# Patient Record
Sex: MALE | Race: WHITE | NOT HISPANIC OR LATINO | Employment: FULL TIME | URBAN - METROPOLITAN AREA
[De-identification: names, ages, dates, MRNs, and addresses within clinical notes are randomized per-mention and may not be internally consistent; named-entity substitution may affect disease eponyms.]

---

## 2017-02-02 ENCOUNTER — OFFICE VISIT (OUTPATIENT)
Dept: SLEEP MEDICINE | Facility: CLINIC | Age: 47
End: 2017-02-02
Attending: NURSE PRACTITIONER
Payer: COMMERCIAL

## 2017-02-02 VITALS
HEART RATE: 87 BPM | HEIGHT: 75 IN | SYSTOLIC BLOOD PRESSURE: 124 MMHG | WEIGHT: 315 LBS | DIASTOLIC BLOOD PRESSURE: 81 MMHG | BODY MASS INDEX: 39.17 KG/M2 | OXYGEN SATURATION: 94 % | RESPIRATION RATE: 18 BRPM

## 2017-02-02 DIAGNOSIS — G47.33 OSA (OBSTRUCTIVE SLEEP APNEA): Primary | ICD-10-CM

## 2017-02-02 DIAGNOSIS — E66.9 OBESITY (BMI 30-39.9): ICD-10-CM

## 2017-02-02 DIAGNOSIS — F51.12 INSUFFICIENT SLEEP SYNDROME: ICD-10-CM

## 2017-02-02 PROCEDURE — 40000809 ZZH STATISTIC NO DOCUMENTATION TO SUPPORT CHARGE

## 2017-02-02 PROCEDURE — 99211 OFF/OP EST MAY X REQ PHY/QHP: CPT | Mod: ZF

## 2017-02-02 NOTE — NURSING NOTE
"Chief Complaint   Patient presents with     RECHECK     Follow up cpap.  New rx for supplies needed       Initial There were no vitals taken for this visit. Estimated body mass index is 38.36 kg/(m^2) as calculated from the following:    Height as of 6/30/16: 1.899 m (6' 2.75\").    Weight as of 9/14/16: 138.347 kg (305 lb).  BP completed using cuff size: large Right arm    ISABELLA Russo          "

## 2017-02-02 NOTE — PROGRESS NOTES
HPI: severe moderate ,  New concerns: rainout    Sleep Review of Systems:     Snoring, snort arousals, gasping while on therapy: no     Bedpartner c/o's of cpap: n/a     Heated humidity problems with rainout/excessive dryness, sore throat: rainout     Opening of mouth while on therapy/excessive leak: nasal pillows: no      Swallowing air: no      Skin problems: no     Insufficient sleep, devoting <7 or more hours to sleep: occasional     Problems falling or staying asleep with cpap: falling     RLS: no    SLEEP SCHEDULE:  Bedtime: 24:00                       Range: 23:00                  Sleep Latency: 1/7 >30  Wakeups: rare  Refall  Final wakeup: 6 or 7    Naps: no    Response to therapy: better  Willingness to continue: yes    Equipment issues: old, rain out    Download today:  Full report scanned into medical record  Allergies:    Allergies   Allergen Reactions     Zantac      flushing       Medications:    Current Outpatient Prescriptions   Medication Sig Dispense Refill     Acetaminophen (TYLENOL PO) Take 1,000 mg by mouth 2 times daily       hydrOXYzine (ATARAX) 25 MG tablet Take 25-50 mg up to three times daily as needed and  mg at night for sleep and anxiety. 60 tablet 6     busPIRone (BUSPAR) 15 MG tablet Take 1.5 tablets (22.5 mg) by mouth 2 times daily 90 tablet 1     sertraline (ZOLOFT) 100 MG tablet Take 2 tablets (200 mg) by mouth daily 180 tablet 3     pantoprazole (PROTONIX) 40 MG enteric coated tablet Take 1 tablet (40 mg) by mouth daily 90 tablet 3     rosuvastatin (CRESTOR) 20 MG tablet Take 1 tablet (20 mg) by mouth daily (Patient taking differently: Take 10 mg by mouth daily 20 mg 1/2 tablet daily= total 10 mg daily) 90 tablet 3     losartan (COZAAR) 50 MG tablet Take 1 tablet (50 mg) by mouth daily 90 tablet 3     ORDER FOR DME Auto-CPAP:  Max 15 cm H2O  Min 8 cm H2O    Continuous    Lifetime need and heated humidity.    1 each 0       Problem List:  Patient Active Problem List     Diagnosis Date Noted     Mixed hyperlipidemia 04/07/2016     Priority: Medium     Bone marrow donor 12/09/2013     Priority: Medium     Donor of stem cell 01/03/2014     Obstructive sleep apnea      Problem list name updated by automated process. Provider to review       Obesity (BMI 30-39.9)      Chest pain 02/17/2012     HYPERLIPIDEMIA LDL GOAL <130 10/31/2010     ERIC (generalized anxiety disorder) 11/13/2009     Benign essential hypertension 06/16/2007     fair control  -  continue current meds- trial of 3 months diet/activity changes - then consider change meds.         Gastroesophageal reflux disease without esophagitis 06/16/2007     symptoms stable on protonix - last EGD shows hiatal hernia but no other findings  - continue meds.       Hypersomnia with sleep apnea 01/08/2006     significant moderate  symptoms, and noted apnea/snoring on sleeping - sleep study.  Problem list name updated by automated process. Provider to review          Past Medical/Surgical History:  Past Medical History   Diagnosis Date     Unspecified essential hypertension      Pure hypercholesterolemia      panic attacks, and anxiety  1/5/2006     stable on paxil     Anxiety      Obesity (BMI 30-39.9)      Obstructive sleep apnea (adult) (pediatric)      PONV (postoperative nausea and vomiting)      Gastro-oesophageal reflux disease      Past Surgical History   Procedure Laterality Date     Surgical history of -        excision of bone tumor     Surgical history of -   12/00     vasectomy     Hernia repair       Esophagoscopy, gastroscopy, duodenoscopy (egd), combined  12/17/2012     Procedure: COMBINED ESOPHAGOSCOPY, GASTROSCOPY, DUODENOSCOPY (EGD);  Gastroscopy  ;  Surgeon: Luc Smith MD;  Location: [facility]     Procure bone marrow  1/3/2014     Procedure: PROCURE BONE MARROW;  Bone Marrow [place] [facility];  Surgeon: Chaz Negro MD;  Location:  OR           Physical Examination:  Vitals: /81 mmHg  Pulse 87  Resp  "18  Ht 1.899 m (6' 2.75\")  Wt 144.697 kg (319 lb)  BMI 40.12 kg/m2  SpO2 94%  BMI= Body mass index is 40.12 kg/(m^2).         [place] Total Score 12/10/2013   Total score - [place] 4       GENERAL APPEARANCE: healthy, alert and no distress          Time spent with patient is 40 minutes, of which >50% was spent in counseling, education and coordination of care.    "

## 2017-02-02 NOTE — PATIENT INSTRUCTIONS
Update supplies    Call preferred one: eligible for new cpap? After September    Update me through my chart      Until you are seen again in clinic in the next 9 months, please watch for a return of sleepiness with obstructive sleep apnea is usually due to a change in the followin) a decrease in usage of cpap  2) decrease in amount of time slept  3) a poor seal (often a function of not following cleaning recommendations or  replacement guidelines)  4)  increase in weight or use of sedating medications resulting in higher pressure needs     If you have tried to address these issues but are still sleepy, please call for an earlier appointment.    Please, do not drive if sleepy

## 2017-02-03 NOTE — PROGRESS NOTES
LOCATION:  [facility].      CHIEF COMPLAINT:  Mr. Amarjit Malik returns to clinic today after possibly a 4-year hiatus to evaluate his need for his PAP therapy for his severe obstructive sleep apnea.      HISTORY OF PRESENT ILLNESS:  Mr. Malik is a 46-year-old male comorbid with hypertension, hypercholesterolemia, anxiety and panic attacks.     A PSG at [facility] on 02/11/2007 revealed an AHI of 54 and RDI of 63 with O2 sat corey of 84%.  The patient was titrated to a CPAP pressure of 9 cm of water.  He stopped using CPAP after approximately 1 year of time due to noise.  Dr. Tian did prescribe an auto CPAP with pressures between 7-12 cm of water.  He never got used to CPAP.  At one point was using a full face mask which he could not tolerate.  On 08/08/2012, he did receive a new ResMed APAP.  He also changed over to nasal pillows and appears to be doing quite nicely.  His [place] Sleepiness score today is 4.  He denies restless legs.  His mental health has been a challenge for him, 20/30 days he states mental health is not good.  He has been having more problems with anxiety and has had his medications adjusted and he is noticing some improvement, 15/30 days, problems with tiredness or fatigue.  Sleep environment does show that pets can bother his sleep.  He is currently sleeping without his bed partner since one of their sons did move and there is a bedroom available and both of them get better sleep, see review of systems below.      REVIEW OF SYSTEMS:  A 14-point comprehensive review of systems completed and negative with the exception of the following:  Muscles and bones:  Has been having more problems with back pain, which can be an issue with standing at work.  Mental health:  Anxiety worsening with increase in Zoloft.  He also has been put on a new NSAIDs for his back pain and seems to be getting some relief with that.      [facility] concerns: [facility]    Sleep Review of  Systems:     Snoring, snort arousals, gasping while on therapy: no     Bedpartner c/o's of cpap: n/a     Heated humidity problems with rainout/excessive dryness, sore throat: rainout     Opening of mouth while on therapy/excessive leak: nasal pillows: no      Swallowing air: no      Skin problems: no     Insufficient sleep, devoting <7 or more hours to sleep: occasional     Problems falling or staying asleep with cpap: falling     RLS: no    SLEEP SCHEDULE:  Bedtime: 24:00                       Range: 23:00                  Sleep Latency: 1/7 >30  Wakeups: rare  Refall:n/a  Final wakeup: 6 or 7    Naps: no    Response to therapy: better  Willingness to continue: yes    Equipment issues: old, rain out    Download today:  Full report scanned into medical record  Allergies:    Allergies   Allergen Reactions     Zantac      flushing       Medications:    Current Outpatient Prescriptions   Medication Sig Dispense Refill     Acetaminophen (TYLENOL PO) Take 1,000 mg by mouth 2 times daily       hydrOXYzine (ATARAX) 25 MG tablet Take 25-50 mg up to three times daily as needed and  mg at night for sleep and anxiety. 60 tablet 6     busPIRone (BUSPAR) 15 MG tablet Take 1.5 tablets (22.5 mg) by mouth 2 times daily 90 tablet 1     sertraline (ZOLOFT) 100 MG tablet Take 2 tablets (200 mg) by mouth daily 180 tablet 3     pantoprazole (PROTONIX) 40 MG enteric coated tablet Take 1 tablet (40 mg) by mouth daily 90 tablet 3     rosuvastatin (CRESTOR) 20 MG tablet Take 1 tablet (20 mg) by mouth daily (Patient taking differently: Take 10 mg by mouth daily 20 mg 1/2 tablet daily= total 10 mg daily) 90 tablet 3     losartan (COZAAR) 50 MG tablet Take 1 tablet (50 mg) by mouth daily 90 tablet 3     ORDER FOR DME Auto-CPAP:  Max 15 cm H2O  Min 8 cm H2O    Continuous    Lifetime need and heated humidity.    1 each 0       Problem List:  Patient Active Problem List    Diagnosis Date Noted     Mixed hyperlipidemia 04/07/2016      "Priority: Medium     Bone marrow donor 12/09/2013     Priority: Medium     Donor of stem cell 01/03/2014     Obstructive sleep apnea      Problem list name updated by automated process. Provider to review       Obesity (BMI 30-39.9)      Chest pain 02/17/2012     HYPERLIPIDEMIA LDL GOAL <130 10/31/2010     ERIC (generalized anxiety disorder) 11/13/2009     Benign essential hypertension 06/16/2007     fair control  -  continue current meds- trial of 3 months diet/activity changes - then consider change meds.         Gastroesophageal reflux disease without esophagitis 06/16/2007     symptoms stable on protonix - last EGD shows hiatal hernia but no other findings  - continue meds.       Hypersomnia with sleep apnea 01/08/2006     significant moderate  symptoms, and noted apnea/snoring on sleeping - sleep study.  Problem list name updated by automated process. Provider to review          Past Medical/Surgical History:  Past Medical History   Diagnosis Date     Unspecified essential hypertension      Pure hypercholesterolemia      panic attacks, and anxiety  1/5/2006     stable on paxil     Anxiety      Obesity (BMI 30-39.9)      Obstructive sleep apnea (adult) (pediatric)      PONV (postoperative nausea and vomiting)      Gastro-oesophageal reflux disease      Past Surgical History   Procedure Laterality Date     Surgical history of -        excision of bone tumor     Surgical history of -   12/00     vasectomy     Hernia repair       Esophagoscopy, gastroscopy, duodenoscopy (egd), combined  12/17/2012     Procedure: COMBINED ESOPHAGOSCOPY, GASTROSCOPY, DUODENOSCOPY (EGD);  Gastroscopy  ;  Surgeon: Luc Smith MD;  Location: [facility]     Procure bone marrow  1/3/2014     Procedure: PROCURE BONE MARROW;  Bone Marrow [place] [facility];  Surgeon: Chaz Negro MD;  Location:  OR       Physical Examination:  Vitals: /81 mmHg  Pulse 87  Resp 18  Ht 1.899 m (6' 2.75\")  Wt 144.697 kg (319 lb)  BMI 40.12 " kg/m2  SpO2 94%  BMI= Body mass index is 40.12 kg/(m^2).    [place] Total Score 12/10/2013   Total score - [place] 4       GENERAL APPEARANCE: healthy, alert and no distress    Download today shows average usage days used 6 hours and 49 minutes.  He is on a ResMed min EPAP of 8 cm, max EPAP of 15, 95th percentile pressure, 12.5 cm of water, a leak at 13.3 L/minute.  Usage graph does show some days with barely more than 4 hours and other days up to 9 hours of use.  His residual AHI is 0.7.  One detailed report does show significant run of obstructive apneas, denies alcohol or sedating medications with these events.     ASSESSMENT AND PLAN:  It is my impression that Mr. Malik, has since he has gone to a nasal pillows interface, has had improved compliance and response to CPAP.  He is willing to continue to use his therapy and feels he is getting a good response and the following plan of care has been developed.   1.  Obstructive sleep apnea with excellent response to CPAP PAP therapy, compliance fair.  I have written an order for his CPAP supplies.  He may be due for a new machine after August of this year and I have recommended that he send me a MyChart at that time.  I will then, if approved for his insurance, write a prescription for a new CPAP.  His biggest complaint with his devices is rainout and he is missing that new technology.   2.  Insufficient sleep syndrome.  It does sound that on some nights he is not getting enough time for sleep.  I am uncertain if he has a delay in his circadian phase and with his early rise time with work, needs to pick a regular bedtime and stick with that.  He is encouraged to get 7-9 hours.   3.  Obesity.  He has gone up a little bit in pounds since his sleep study and I have encouraged him to lose weight as he is able.      Thank you for allowing me to participate in this kind man's care.      Time spent with patient was 40 minutes, of which greater than 50% was spent in  counseling, education and coordination of care.         KASEY TAVARES, ELICEO, CNP             D: 2017 17:51   T: 2017 18:23   MT: LOPEZ      Name:     JAYDA KNOX   MRN:      [id number]        Account:      [id number]   :      1970           Visit Date:   2017      Document: [id number]

## 2017-04-11 ENCOUNTER — MYC REFILL (OUTPATIENT)
Dept: FAMILY MEDICINE | Facility: CLINIC | Age: 47
End: 2017-04-11

## 2017-04-11 DIAGNOSIS — K21.9 GASTROESOPHAGEAL REFLUX DISEASE WITHOUT ESOPHAGITIS: ICD-10-CM

## 2017-04-12 RX ORDER — PANTOPRAZOLE SODIUM 40 MG/1
40 TABLET, DELAYED RELEASE ORAL DAILY
Qty: 90 TABLET | Refills: 0 | Status: SHIPPED | OUTPATIENT
Start: 2017-04-12 | End: 2017-07-07

## 2017-04-12 NOTE — TELEPHONE ENCOUNTER
Message from [employer]:  Original authorizing provider: DO Amarjit Ritchie would like a refill of the following medications:  pantoprazole (PROTONIX) 40 MG enteric coated tablet [Ashley Crawley DO]    Preferred pharmacy: [facility] [address]    Comment:  I need this refilled please. can I also have it in 90 day Supply with four or five refills? Thanks Amarjit

## 2017-04-29 ENCOUNTER — MYC REFILL (OUTPATIENT)
Dept: FAMILY MEDICINE | Facility: CLINIC | Age: 47
End: 2017-04-29

## 2017-04-29 DIAGNOSIS — I10 ESSENTIAL HYPERTENSION: ICD-10-CM

## 2017-04-29 DIAGNOSIS — E78.5 HYPERLIPIDEMIA LDL GOAL <130: Primary | ICD-10-CM

## 2017-05-01 RX ORDER — LOSARTAN POTASSIUM 50 MG/1
50 TABLET ORAL DAILY
Qty: 30 TABLET | Refills: 0 | Status: SHIPPED | OUTPATIENT
Start: 2017-05-01 | End: 2017-05-29

## 2017-05-01 NOTE — TELEPHONE ENCOUNTER
Losartan/ cozaar  Last Written Prescription Date: 2/16/16  Last Fill Quantity: 90, # refills: 3  Last Office Visit with [facility] [place], [facility] or [facility] prescribing provider: 6/30/16       Potassium   Date Value Ref Range Status   04/07/2016 3.9 3.4 - 5.3 mmol/L Final     Creatinine   Date Value Ref Range Status   04/07/2016 0.92 0.66 - 1.25 mg/dL Final     BP Readings from Last 3 Encounters:   02/02/17 124/81   09/14/16 126/87   09/14/16 126/87     Medication is being filled for 1 time refill only due to:  Patient needs labs potassium and creat. Kim Mclean RNC  [employer]clean RNC

## 2017-05-01 NOTE — TELEPHONE ENCOUNTER
Message from [employer]:  Original authorizing provider: DO Amarjit Ritchie would like a refill of the following medications:  losartan (COZAAR) 50 MG tablet [Ashley Crawley DO]    Preferred pharmacy: [facility] [address]    Comment:

## 2017-05-29 DIAGNOSIS — I10 ESSENTIAL HYPERTENSION: ICD-10-CM

## 2017-05-29 DIAGNOSIS — E78.5 HYPERLIPIDEMIA LDL GOAL <130: ICD-10-CM

## 2017-05-30 NOTE — TELEPHONE ENCOUNTER
Losartan     Last Written Prescription Date: 05/01/17  Last Fill Quantity: 30, # refills: 0  [place] [facility] or [facility] prescribing provider: 06/30/16       Potassium   Date Value Ref Range Status   04/07/2016 3.9 3.4 - 5.3 mmol/L Final     Creatinine   Date Value Ref Range Status   04/07/2016 0.92 0.66 - 1.25 mg/dL Final     BP Readings from Last 3 Encounters:   02/02/17 124/81   09/14/16 126/87   09/14/16 126/87

## 2017-06-01 RX ORDER — LOSARTAN POTASSIUM 50 MG/1
50 TABLET ORAL DAILY
Qty: 30 TABLET | Refills: 0 | Status: SHIPPED | OUTPATIENT
Start: 2017-06-01 | End: 2017-06-28

## 2017-06-14 DIAGNOSIS — G47.33 OBSTRUCTIVE SLEEP APNEA: Primary | ICD-10-CM

## 2017-06-28 ENCOUNTER — MYC REFILL (OUTPATIENT)
Dept: PSYCHIATRY | Facility: CLINIC | Age: 47
End: 2017-06-28

## 2017-06-28 DIAGNOSIS — I10 ESSENTIAL HYPERTENSION: ICD-10-CM

## 2017-06-28 DIAGNOSIS — E78.5 HYPERLIPIDEMIA LDL GOAL <130: ICD-10-CM

## 2017-06-28 DIAGNOSIS — F41.1 GAD (GENERALIZED ANXIETY DISORDER): ICD-10-CM

## 2017-06-29 RX ORDER — BUSPIRONE HYDROCHLORIDE 15 MG/1
22.5 TABLET ORAL 2 TIMES DAILY
Qty: 90 TABLET | Refills: 1 | OUTPATIENT
Start: 2017-06-29

## 2017-06-29 NOTE — TELEPHONE ENCOUNTER
Message from [employer]:  Original authorizing provider: ELICEO Chatterjee would like a refill of the following medications:  busPIRone (BUSPAR) 15 MG tablet [ELICEO Chatterjee]    Preferred pharmacy: [place], [place] - [address]    Comment:  I have been out of town I will set up an appointment in the next two to three weeks    Medication renewals requested in this message routed to other providers:  losartan (COZAAR) 50 MG tablet [Ashley Crawley, ]

## 2017-06-30 ENCOUNTER — OFFICE VISIT (OUTPATIENT)
Dept: PSYCHIATRY | Facility: CLINIC | Age: 47
End: 2017-06-30
Payer: COMMERCIAL

## 2017-06-30 VITALS
BODY MASS INDEX: 38.91 KG/M2 | DIASTOLIC BLOOD PRESSURE: 96 MMHG | RESPIRATION RATE: 18 BRPM | SYSTOLIC BLOOD PRESSURE: 148 MMHG | WEIGHT: 309.2 LBS | HEART RATE: 83 BPM

## 2017-06-30 DIAGNOSIS — F41.1 GAD (GENERALIZED ANXIETY DISORDER): Chronic | ICD-10-CM

## 2017-06-30 PROCEDURE — 99213 OFFICE O/P EST LOW 20 MIN: CPT | Performed by: CLINICAL NURSE SPECIALIST

## 2017-06-30 RX ORDER — BUSPIRONE HYDROCHLORIDE 15 MG/1
22.5 TABLET ORAL 2 TIMES DAILY
Qty: 90 TABLET | Refills: 3 | Status: SHIPPED | OUTPATIENT
Start: 2017-06-30 | End: 2018-06-07

## 2017-06-30 RX ORDER — SERTRALINE HYDROCHLORIDE 100 MG/1
200 TABLET, FILM COATED ORAL DAILY
Qty: 180 TABLET | Refills: 3 | Status: SHIPPED | OUTPATIENT
Start: 2017-06-30 | End: 2018-02-27 | Stop reason: ALTCHOICE

## 2017-06-30 ASSESSMENT — ANXIETY QUESTIONNAIRES
7. FEELING AFRAID AS IF SOMETHING AWFUL MIGHT HAPPEN: NOT AT ALL
2. NOT BEING ABLE TO STOP OR CONTROL WORRYING: NOT AT ALL
GAD7 TOTAL SCORE: 2
1. FEELING NERVOUS, ANXIOUS, OR ON EDGE: SEVERAL DAYS
6. BECOMING EASILY ANNOYED OR IRRITABLE: NOT AT ALL
3. WORRYING TOO MUCH ABOUT DIFFERENT THINGS: NOT AT ALL
5. BEING SO RESTLESS THAT IT IS HARD TO SIT STILL: NOT AT ALL

## 2017-06-30 ASSESSMENT — PATIENT HEALTH QUESTIONNAIRE - PHQ9: 5. POOR APPETITE OR OVEREATING: SEVERAL DAYS

## 2017-06-30 NOTE — MR AVS SNAPSHOT
After Visit Summary   6/30/2017    Amarjit Malik    MRN: [id number]           Patient Information     Date Of Birth          1970        Visit Information        Provider Department      6/30/2017 10:45 AM Nolvia Katz APRN [facility]        Today's Diagnoses     ERIC (generalized anxiety disorder)          Care Instructions    Treatment Plan:  Continue sertraline (Zoloft) 200 mg daily, hydroxyzine 25-50 mg three times daily as needed for anxiety.     Restart buspirone (Buspar) 7.5 mg twice daily for 3 days, then 15 mg twice daily for 3 days, then 22.5 mg twice daily.     Follow up with primary care provider.     - Recommend patient discuss medications with their pharmacist. Risks and benefits for medications were discussed including, but not limited to, side effects.   - Safety plan was reviewed; to the ER as needed or call after hours crisis line; [phone]  - Education and counseling was done regarding use of medications, psychotherapy options  - Call [phone] for appointment or to speak to a nurse.    -Office hours: Monday through Thursday 8:00 am to 4:30 pm; Friday 8:00 am to Noon.             Follow-ups after your visit        Your next 10 appointments already scheduled     Jul 07, 2017  6:40 AM CDT   SHORT with Ashley Crawley,    [facility]    [address] [address] [phone]              Who to contact     If you have questions or need follow up information about today's clinic visit or your schedule please contact [facility] directly at [phone].  Normal or non-critical lab and imaging results will be communicated to you by MyChart, letter or phone within 4 business days after the clinic has received the results. If you do not hear from us within 7 days, please contact the clinic through MyChart or phone. If you have a critical or abnormal lab result, we  will notify you by phone as soon as possible.  Submit refill requests through [employer] or call your pharmacy and they will forward the refill request to us. Please allow 3 business days for your refill to be completed.          Additional Information About Your Visit        [employer] Information     [employer] gives you secure access to your electronic health record. If you see a primary care provider, you can also send messages to your care team and make appointments. If you have questions, please call your primary care clinic.  If you do not have a primary care provider, please call [phone] and they will assist you.        Care EveryWhere ID     This is your Care EveryWhere ID. This could be used by other organizations to access your [facility] medical records  [id number]        Your Vitals Were     Pulse Respirations BMI (Body Mass Index)             83 18 38.91 kg/m2          Blood Pressure from Last 3 Encounters:   06/30/17 (!) 148/96   02/02/17 124/81   09/14/16 126/87    Weight from Last 3 Encounters:   06/30/17 (!) 309 lb 3.2 oz (140.3 kg)   02/02/17 (!) 319 lb (144.7 kg)   09/14/16 (!) 305 lb (138.3 kg)              Today, you had the following     No orders found for display         Today's Medication Changes          These changes are accurate as of: 6/30/17 11:05 AM.  If you have any questions, ask your nurse or doctor.               These medicines have changed or have updated prescriptions.        Dose/Directions    rosuvastatin 20 MG tablet   Commonly known as:  CRESTOR   This may have changed:    - how much to take  - additional instructions   Used for:  Hyperlipidemia LDL goal <130        Dose:  20 mg   Take 1 tablet (20 mg) by mouth daily   Quantity:  90 tablet   Refills:  3            Where to get your medicines      These medications were sent to [facility] - [address]  [address] [address]     Phone:  [phone]     busPIRone 15 MG tablet     sertraline 100 MG tablet                Primary Care Provider Office Phone # Fax #    Ashley Crawley [phone]       [facility] [address] [address]        Equal Access to Services     ASTER BARNEY : Hadshyanne palacio haddarinelo Soomaali, waaxda luqadaha, qaybta kaalmada adeegyada, waxalejo carey haygelyn jeri patriciasami estevez. So [facility] [phone].    ATENCIÓN: Si habla español, tiene a fontanez disposición servicios gratuitos de asistencia lingüística. Llame al [phone].    We comply with applicable federal civil rights laws and [place] laws. We do not discriminate on the basis of race, color, national origin, age, disability sex, sexual orientation or gender identity.            Thank you!     Thank you for choosing [facility]  for your care. Our goal is always to provide you with excellent care. Hearing back from our patients is one way we can continue to improve our services. Please take a few minutes to complete the written survey that you may receive in the mail after your visit with us. Thank you!             Your Updated Medication List - Protect others around you: Learn how to safely use, store and throw away your medicines at www.disposemymeds.org.          This list is accurate as of: 6/30/17 11:05 AM.  Always use your most recent med list.                   Brand Name Dispense Instructions for use Diagnosis    busPIRone 15 MG tablet    BUSPAR    90 tablet    Take 1.5 tablets (22.5 mg) by mouth 2 times daily    ERIC (generalized anxiety disorder)       hydrOXYzine 25 MG tablet    ATARAX    60 tablet    Take 25-50 mg up to three times daily as needed and  mg at night for sleep and anxiety.    ERIC (generalized anxiety disorder)       losartan 50 MG tablet    COZAAR    30 tablet    Take 1 tablet (50 mg) by mouth daily (Needs follow-up appointment for this medication)    Essential hypertension, Hyperlipidemia LDL goal <130       order for DME      1 each    Auto-CPAP: Max 15 cm H2O Min 8 cm H2O  Continuous  Lifetime need and heated humidity.    Hypersomnia with sleep apnea, unspecified       pantoprazole 40 MG EC tablet    PROTONIX    90 tablet    Take 1 tablet (40 mg) by mouth daily    Gastroesophageal reflux disease without esophagitis       rosuvastatin 20 MG tablet    CRESTOR    90 tablet    Take 1 tablet (20 mg) by mouth daily    Hyperlipidemia LDL goal <130       sertraline 100 MG tablet    ZOLOFT    180 tablet    Take 2 tablets (200 mg) by mouth daily    ERIC (generalized anxiety disorder)       TYLENOL PO      Take 1,000 mg by mouth 2 times daily

## 2017-06-30 NOTE — PATIENT INSTRUCTIONS
Treatment Plan:  Continue sertraline (Zoloft) 200 mg daily, hydroxyzine 25-50 mg three times daily as needed for anxiety.     Restart buspirone (Buspar) 7.5 mg twice daily for 3 days, then 15 mg twice daily for 3 days, then 22.5 mg twice daily.     Follow up with primary care provider.     - Recommend patient discuss medications with their pharmacist. Risks and benefits for medications were discussed including, but not limited to, side effects.   - Safety plan was reviewed; to the ER as needed or call after hours crisis line; [phone]  - Education and counseling was done regarding use of medications, psychotherapy options  - Call [phone] for appointment or to speak to a nurse.    -Office hours: Monday through Thursday 8:00 am to 4:30 pm; Friday 8:00 am to Noon.

## 2017-06-30 NOTE — PROGRESS NOTES
"      Psychiatric Progress Note    Name: Amarjti Malik  Date: 6/30/2017  Length of Visit: 30 minutes  MRN: [id number]      Current Outpatient Prescriptions   Medication Sig     losartan (COZAAR) 50 MG tablet Take 1 tablet (50 mg) by mouth daily (Needs follow-up appointment for this medication)     pantoprazole (PROTONIX) 40 MG EC tablet Take 1 tablet (40 mg) by mouth daily     hydrOXYzine (ATARAX) 25 MG tablet Take 25-50 mg up to three times daily as needed and  mg at night for sleep and anxiety.     busPIRone (BUSPAR) 15 MG tablet Take 1.5 tablets (22.5 mg) by mouth 2 times daily     sertraline (ZOLOFT) 100 MG tablet Take 2 tablets (200 mg) by mouth daily     rosuvastatin (CRESTOR) 20 MG tablet Take 1 tablet (20 mg) by mouth daily (Patient taking differently: Take 10 mg by mouth daily 20 mg 1/2 tablet daily= total 10 mg daily)     Acetaminophen (TYLENOL PO) Take 1,000 mg by mouth 2 times daily     ORDER FOR DME Auto-CPAP:  Max 15 cm H2O  Min 8 cm H2O    Continuous    Lifetime need and heated humidity.        No current facility-administered medications for this visit.        Therapist:  None    PHQ-9:  PHQ-9 score:    PHQ-9 SCORE 9/14/2016   Total Score -   Total Score 2       ERIC-7:  ERIC-7 SCORE 1/21/2015 6/30/2016 9/14/2016   Total Score 2 - -   Total Score - 11 7           Interim History:  Patient returns for follow up from initial appointment 9-. Sertraline (Zoloft) 200 mg daily and hydroxyzine 25-50 mg twice daily was continued. Buspirone (Buspar) was increased to 22.5 mg twice daily. Patient was to follow up in 3 weeks.     Patient reports he ran out of buspirone (Buspar) approximately 5 days ago and \"doesn't feel any different\". Patient reports has not needed to use the hydroxyzine.  Patient reports the sertraline (Zoloft) 200 mg has been working. Patient states he is here for refills. Patient denies depression and anxiety symptoms.       Past Medical History:   Diagnosis Date     Anxiety      " "Gastro-oesophageal reflux disease      Obesity (BMI 30-39.9)      Obstructive sleep apnea (adult) (pediatric)      panic attacks, and anxiety  1/5/2006    stable on paxil     PONV (postoperative nausea and vomiting)      Pure hypercholesterolemia      Unspecified essential hypertension        10 point ROS is negative except for those listed above.     Vital Signs:   BP (!) 148/96  Pulse 83  Resp 18  Wt (!) 309 lb 3.2 oz (140.3 kg)  BMI 38.91 kg/m2      Mental Status Assessment:  Appearance:  Well groomed     Behavior/relationship to examiner/demeanor:  Cooperative, engaged and pleasant  Motor activity:  Normal  Gait:  Normal   Speech:  Normal in volume, articulation, coherence   Mood (subjective report):  \"Good\"  Affect (objective appearance):  Mood congruent  Thought Process (Associations):  Logical, linear and goal directed  Thought content:  No evidence of suicidal or homicidal ideation,          no overt psychosis and                    patient does not appear to be responding to internal stimuli  Oriented to person, place, date/time   Attention Span and concentration: Intact   Memory:  Short-term memory intact and Long-term memory; Intact  Language:  Fluent   Fund of Knowledge/Intelligence:  Average  Use of language: Intact   Abstraction:  Normal  Insight:  Adequate  Judgment:  Adequate for safety    DSM DIAGNOSIS:  300.01 (F41.0) Panic Disorder  300.02 (F41.1) Generalized Anxiety Disorder    Assessment:  Patient's mood is stable. Refills provided. Follow up with PCP.     Medication side effects and alternatives were reviewed.     Treatment Plan:  Continue sertraline (Zoloft) 200 mg daily, hydroxyzine 25-50 mg three times daily as needed for anxiety.     If needed, restart buspirone (Buspar) 7.5 mg twice daily for 3 days, then 15 mg twice daily for 3 days, then 22.5 mg twice daily.     Follow up with primary care provider.     - Recommend patient discuss medications with their pharmacist. Risks and benefits " for medications were discussed including, but not limited to, side effects.   - Safety plan was reviewed; to the ER as needed or call after hours crisis line; [phone]  - Education and counseling was done regarding use of medications, psychotherapy options  - Call [phone] for appointment or to speak to a nurse.    -Office hours: Monday through Thursday 8:00 am to 4:30 pm; Friday 8:00 am to Noon.   - Patient received a copy of this Treatment Plan today.    The patient is being returned to the referring provider for ongoing care and medication prescribing.  The patient can be referred back to this service for further consultation as needed.      Signed:  Nolvia Katz RN, MS, CNS-BC

## 2017-06-30 NOTE — NURSING NOTE
"Chief Complaint   Patient presents with     Recheck Medication       Initial BP (!) 148/96  Pulse 83  Resp 18  Wt (!) 309 lb 3.2 oz (140.3 kg)  BMI 38.91 kg/m2 Estimated body mass index is 38.91 kg/(m^2) as calculated from the following:    Height as of 2/2/17: 6' 2.75\" (1.899 m).    Weight as of this encounter: 309 lb 3.2 oz (140.3 kg).  Medication Reconciliation: complete    "

## 2017-07-01 ASSESSMENT — ANXIETY QUESTIONNAIRES: GAD7 TOTAL SCORE: 2

## 2017-07-01 ASSESSMENT — PATIENT HEALTH QUESTIONNAIRE - PHQ9: SUM OF ALL RESPONSES TO PHQ QUESTIONS 1-9: 3

## 2017-07-06 RX ORDER — LOSARTAN POTASSIUM 50 MG/1
TABLET ORAL
Qty: 30 TABLET | Refills: 0 | OUTPATIENT
Start: 2017-07-06

## 2017-07-07 ENCOUNTER — OFFICE VISIT (OUTPATIENT)
Dept: FAMILY MEDICINE | Facility: CLINIC | Age: 47
End: 2017-07-07
Payer: COMMERCIAL

## 2017-07-07 VITALS
WEIGHT: 312 LBS | DIASTOLIC BLOOD PRESSURE: 84 MMHG | HEIGHT: 75 IN | TEMPERATURE: 96.4 F | HEART RATE: 74 BPM | BODY MASS INDEX: 38.79 KG/M2 | SYSTOLIC BLOOD PRESSURE: 123 MMHG

## 2017-07-07 DIAGNOSIS — D23.5 BENIGN NEOPLASM OF SKIN OF TRUNK, EXCEPT SCROTUM: ICD-10-CM

## 2017-07-07 DIAGNOSIS — I10 ESSENTIAL HYPERTENSION: ICD-10-CM

## 2017-07-07 DIAGNOSIS — R22.2 CHEST WALL MASS: ICD-10-CM

## 2017-07-07 DIAGNOSIS — D17.30 LIPOMA OF SKIN AND SUBCUTANEOUS TISSUE: ICD-10-CM

## 2017-07-07 DIAGNOSIS — E78.5 HYPERLIPIDEMIA LDL GOAL <130: ICD-10-CM

## 2017-07-07 DIAGNOSIS — K21.9 GASTROESOPHAGEAL REFLUX DISEASE WITHOUT ESOPHAGITIS: ICD-10-CM

## 2017-07-07 DIAGNOSIS — Z12.5 SCREENING FOR PROSTATE CANCER: Primary | ICD-10-CM

## 2017-07-07 LAB
ANION GAP SERPL CALCULATED.3IONS-SCNC: 6 MMOL/L (ref 3–14)
BUN SERPL-MCNC: 20 MG/DL (ref 7–30)
CALCIUM SERPL-MCNC: 8.8 MG/DL (ref 8.5–10.1)
CHLORIDE SERPL-SCNC: 106 MMOL/L (ref 94–109)
CHOLEST SERPL-MCNC: 224 MG/DL
CO2 SERPL-SCNC: 28 MMOL/L (ref 20–32)
CREAT SERPL-MCNC: 0.96 MG/DL (ref 0.66–1.25)
GFR SERPL CREATININE-BSD FRML MDRD: 84 ML/MIN/1.7M2
GLUCOSE SERPL-MCNC: 95 MG/DL (ref 70–99)
HDLC SERPL-MCNC: 31 MG/DL
LDLC SERPL CALC-MCNC: ABNORMAL MG/DL
LDLC SERPL DIRECT ASSAY-MCNC: 133 MG/DL
NONHDLC SERPL-MCNC: 193 MG/DL
POTASSIUM SERPL-SCNC: 3.9 MMOL/L (ref 3.4–5.3)
PSA SERPL-ACNC: 0.58 UG/L (ref 0–4)
SODIUM SERPL-SCNC: 140 MMOL/L (ref 133–144)
TRIGL SERPL-MCNC: 431 MG/DL

## 2017-07-07 PROCEDURE — 80061 LIPID PANEL: CPT | Performed by: INTERNAL MEDICINE

## 2017-07-07 PROCEDURE — 80048 BASIC METABOLIC PNL TOTAL CA: CPT | Performed by: INTERNAL MEDICINE

## 2017-07-07 PROCEDURE — 83721 ASSAY OF BLOOD LIPOPROTEIN: CPT | Mod: 59 | Performed by: INTERNAL MEDICINE

## 2017-07-07 PROCEDURE — G0103 PSA SCREENING: HCPCS | Performed by: INTERNAL MEDICINE

## 2017-07-07 PROCEDURE — 36415 COLL VENOUS BLD VENIPUNCTURE: CPT | Performed by: INTERNAL MEDICINE

## 2017-07-07 PROCEDURE — 99214 OFFICE O/P EST MOD 30 MIN: CPT | Performed by: INTERNAL MEDICINE

## 2017-07-07 RX ORDER — LOSARTAN POTASSIUM 50 MG/1
50 TABLET ORAL DAILY
Qty: 90 TABLET | Refills: 3 | Status: SHIPPED | OUTPATIENT
Start: 2017-07-07 | End: 2018-07-31

## 2017-07-07 RX ORDER — PANTOPRAZOLE SODIUM 40 MG/1
40 TABLET, DELAYED RELEASE ORAL DAILY
Qty: 90 TABLET | Refills: 3 | Status: SHIPPED | OUTPATIENT
Start: 2017-07-07 | End: 2018-07-06

## 2017-07-07 RX ORDER — ROSUVASTATIN CALCIUM 10 MG/1
10 TABLET, COATED ORAL DAILY
Qty: 90 TABLET | Refills: 3 | Status: SHIPPED | OUTPATIENT
Start: 2017-07-07 | End: 2018-07-31

## 2017-07-07 NOTE — NURSING NOTE
"Chief Complaint   Patient presents with     Results     go over done today     Hypertension     recheck     Refill Request     pending       Initial /84  Pulse 74  Temp 96.4  F (35.8  C) (Tympanic)  Ht 6' 2.75\" (1.899 m)  Wt (!) 312 lb (141.5 kg)  BMI 39.26 kg/m2 Estimated body mass index is 39.26 kg/(m^2) as calculated from the following:    Height as of this encounter: 6' 2.75\" (1.899 m).    Weight as of this encounter: 312 lb (141.5 kg).  Medication Reconciliation: complete  "

## 2017-07-07 NOTE — PATIENT INSTRUCTIONS
Thank you for choosing [facility].  You may be receiving a survey in the mail from Suleman Perez regarding your visit today.  Please take a few minutes to complete and return the survey to let us know how we are doing.      If you have questions or concerns, please contact us via [employer] or you can contact your care team at [phone].    Our Clinic hours are:  Monday 6:40 am  to 7:00 pm  Tuesday -Friday 6:40 am to 5:00 pm    The [place] outpatient lab hours are:  Monday - Friday 6:10 am to 4:45 pm  Saturdays 7:00 am to 11:00 am  Appointments are required, call [phone]    If you have clinical questions after hours or would like to schedule an appointment,  call the clinic at [phone].           [facility] - [id number]      1. Refills given  2. Labs to return later today  3. Referral to general surgery  4. Referral for ultrasound of chest wall mass

## 2017-07-07 NOTE — MR AVS SNAPSHOT
After Visit Summary   7/7/2017    Amarjit Malik    MRN: [id number]           Patient Information     Date Of Birth          1970        Visit Information        Provider Department      7/7/2017 6:40 AM Ashley Crawley, DO [facility]        Today's Diagnoses     Screening for prostate cancer    -  1    Essential hypertension        Hyperlipidemia LDL goal <130        Gastroesophageal reflux disease without esophagitis        Chest wall mass        Benign neoplasm of skin of trunk, except scrotum        Lipoma of skin and subcutaneous tissue          Care Instructions          Thank you for choosing [facility] (formerly [facility]  You may be receiving a survey in the mail from Suleman Perez regarding your visit today.  Please take a few minutes to complete and return the survey to let us know how we are doing.      If you have questions or concerns, please contact us via [employer] or you can contact your care team at [phone].    Our Clinic hours are:  Monday 6:40 am  to 7:00 pm  Tuesday -Friday 6:40 am to 5:00 pm    The [place] outpatient lab hours are:  Monday - Friday 6:10 am to 4:45 pm  Saturdays 7:00 am to 11:00 am  Appointments are required, call [phone]    If you have clinical questions after hours or would like to schedule an appointment,  call the clinic at [phone].           [facility] - [id number]      1. Refills given  2. Labs to return later today  3. Referral to general surgery  4. Referral for ultrasound of chest wall mass          Follow-ups after your visit        Additional Services     GENERAL SURG ADULT REFERRAL       Your provider has referred you to: [facility]: [facility] [phone]   [email or web address]    Please be aware that coverage of these services is subject to the terms and limitations of your health insurance plan.  Call member services at your health plan with any  benefit or coverage questions.      Please bring the following with you to your appointment:    (1) Any X-Rays, CTs or MRIs which have been performed.  Contact the facility where they were done to arrange for  prior to your scheduled appointment.   (2) List of current medications   (3) This referral request   (4) Any documents/labs given to you for this referral                  Future tests that were ordered for you today     Open Future Orders        Priority Expected Expires Ordered    US Breast Left Complete 4 Quadrants Routine  7/7/2018 7/7/2017            Who to contact     If you have questions or need follow up information about today's clinic visit or your schedule please contact [facility] directly at [phone].  Normal or non-critical lab and imaging results will be communicated to you by MyChart, letter or phone within 4 business days after the clinic has received the results. If you do not hear from us within 7 days, please contact the clinic through [employer] or phone. If you have a critical or abnormal lab result, we will notify you by phone as soon as possible.  Submit refill requests through [employer] or call your pharmacy and they will forward the refill request to us. Please allow 3 business days for your refill to be completed.          Additional Information About Your Visit        [employer] Expert Information     [employer] gives you secure access to your electronic health record. If you see a primary care provider, you can also send messages to your care team and make appointments. If you have questions, please call your primary care clinic.  If you do not have a primary care provider, please call [phone] and they will assist you.        Care EveryWhere ID     This is your Care EveryWhere ID. This could be used by other organizations to access your [place] medical records  [id number]        Your Vitals Were     Pulse Temperature Height BMI (Body Mass Index)          74 96.4  F  "(35.8  C) (Tympanic) 6' 2.75\" (1.899 m) 39.26 kg/m2         Blood Pressure from Last 3 Encounters:   07/07/17 123/84   06/30/17 (!) 148/96   02/02/17 124/81    Weight from Last 3 Encounters:   07/07/17 (!) 312 lb (141.5 kg)   06/30/17 (!) 309 lb 3.2 oz (140.3 kg)   02/02/17 (!) 319 lb (144.7 kg)              We Performed the Following     **Basic metabolic panel FUTURE anytime     **Lipid panel reflex to direct LDL FUTURE anytime     GENERAL SURG ADULT REFERRAL     PSA, screen          Today's Medication Changes          These changes are accurate as of: 7/7/17  7:03 AM.  If you have any questions, ask your nurse or doctor.               These medicines have changed or have updated prescriptions.        Dose/Directions    losartan 50 MG tablet   Commonly known as:  COZAAR   This may have changed:  additional instructions   Used for:  Essential hypertension, Hyperlipidemia LDL goal <130   Changed by:  Ashley Crawley DO        Dose:  50 mg   Take 1 tablet (50 mg) by mouth daily   Quantity:  90 tablet   Refills:  3       rosuvastatin 10 MG tablet   Commonly known as:  CRESTOR   This may have changed:    - medication strength  - how much to take   Used for:  Hyperlipidemia LDL goal <130   Changed by:  Ashley Crawley DO        Dose:  10 mg   Take 1 tablet (10 mg) by mouth daily   Quantity:  90 tablet   Refills:  3            Where to get your medicines      These medications were sent to [facility] [id number] [facility] [address]     Phone:  [phone]     losartan 50 MG tablet    pantoprazole 40 MG EC tablet    rosuvastatin 10 MG tablet                Primary Care Provider Office Phone # Fax #    Ashley Cralwey [phone]       [facility] [address] [facility] [address]        Equal Access to Services     ASTER BARNEY AH: Gloria chávezo Soomaali, waaxda luqadaha, qaybta kaalmada adejarrod, he " carey taylorerum byrd'aan ah. So [facility] [phone].    ATENCIÓN: Si premala celi, tiene a fontanez disposición servicios gratuitos de asistencia lingüística. Kristyn yanes [phone].    We comply with applicable federal civil rights laws and [place] laws. We do not discriminate on the basis of race, color, national origin, age, disability sex, sexual orientation or gender identity.            Thank you!     Thank you for choosing [facility]  for your care. Our goal is always to provide you with excellent care. Hearing back from our patients is one way we can continue to improve our services. Please take a few minutes to complete the written survey that you may receive in the mail after your visit with us. Thank you!             Your Updated Medication List - Protect others around you: Learn how to safely use, store and throw away your medicines at www.disposemymeds.org.          This list is accurate as of: 7/7/17  7:03 AM.  Always use your most recent med list.                   Brand Name Dispense Instructions for use Diagnosis    busPIRone 15 MG tablet    BUSPAR    90 tablet    Take 1.5 tablets (22.5 mg) by mouth 2 times daily    ERIC (generalized anxiety disorder)       hydrOXYzine 25 MG tablet    ATARAX    60 tablet    Take 25-50 mg up to three times daily as needed and  mg at night for sleep and anxiety.    ERIC (generalized anxiety disorder)       losartan 50 MG tablet    COZAAR    90 tablet    Take 1 tablet (50 mg) by mouth daily    Essential hypertension, Hyperlipidemia LDL goal <130       order for DME     1 each    Auto-CPAP: Max 15 cm H2O Min 8 cm H2O  Continuous  Lifetime need and heated humidity.    Hypersomnia with sleep apnea, unspecified       pantoprazole 40 MG EC tablet    PROTONIX    90 tablet    Take 1 tablet (40 mg) by mouth daily    Gastroesophageal reflux disease without esophagitis       rosuvastatin 10 MG tablet    CRESTOR    90 tablet    Take 1 tablet (10 mg) by mouth  daily    Hyperlipidemia LDL goal <130       sertraline 100 MG tablet    ZOLOFT    180 tablet    Take 2 tablets (200 mg) by mouth daily    ERIC (generalized anxiety disorder)       TYLENOL PO      Take 1,000 mg by mouth 2 times daily

## 2017-07-07 NOTE — PROGRESS NOTES
SUBJECTIVE:                                                    Amarjit Malik is a 46 year old male who presents to clinic today for the following health issues:      Hypertension Follow-up      Outpatient blood pressures are being checked at home.  Results are 130/92.    Low Salt Diet: not monitoring salt    Anxiety Follow-Up    Status since last visit: No change    Other associated symptoms:None    Complicating factors:   Significant life event: No   Current substance abuse: None  Depression symptoms: No  ERIC-7 SCORE 6/30/2016 9/14/2016 6/30/2017   Total Score - - -   Total Score 11 7 2       GAD7        Amount of exercise or physical activity: None    Problems taking medications regularly: No    Medication side effects: none    Diet: regular (no restrictions)    Chief Complaint   Patient presents with     Results     go over done today     Hypertension     recheck     Refill Request     pending   Derm: few spots/cysts checked out.  Posterior neck - tender frequently.  Present for a while   left arm near antecubital space - painful when bumped, present for while   left lower breast area - noted 6-7 months, not getting bigger, non-painful      Current Outpatient Prescriptions   Medication Sig Dispense Refill     sertraline (ZOLOFT) 100 MG tablet Take 2 tablets (200 mg) by mouth daily 180 tablet 3     losartan (COZAAR) 50 MG tablet Take 1 tablet (50 mg) by mouth daily (Needs follow-up appointment for this medication) 30 tablet 0     pantoprazole (PROTONIX) 40 MG EC tablet Take 1 tablet (40 mg) by mouth daily 90 tablet 0     rosuvastatin (CRESTOR) 20 MG tablet Take 1 tablet (20 mg) by mouth daily (Patient taking differently: Take 10 mg by mouth daily 20 mg 1/2 tablet daily= total 10 mg daily) 90 tablet 3     busPIRone (BUSPAR) 15 MG tablet Take 1.5 tablets (22.5 mg) by mouth 2 times daily (Patient not taking: Reported on 7/7/2017) 90 tablet 3     hydrOXYzine (ATARAX) 25 MG tablet Take 25-50 mg up to three times  "daily as needed and  mg at night for sleep and anxiety. (Patient not taking: Reported on 7/7/2017) 60 tablet 6     Acetaminophen (TYLENOL PO) Take 1,000 mg by mouth 2 times daily       ORDER FOR DME Auto-CPAP:  Max 15 cm H2O  Min 8 cm H2O    Continuous    Lifetime need and heated humidity.    1 each 0       Reviewed and updated as needed this visit by clinical staff  Tobacco  Allergies  Meds  Problems       Reviewed and updated as needed this visit by Provider  Allergies  Meds  Problems         ROS:  Constitutional, HEENT, cardiovascular, pulmonary, gi and gu systems are negative, except as otherwise noted.    OBJECTIVE:     /84  Pulse 74  Temp 96.4  F (35.8  C) (Tympanic)  Ht 6' 2.75\" (1.899 m)  Wt (!) 312 lb (141.5 kg)  BMI 39.26 kg/m2  Body mass index is 39.26 kg/(m^2).  GENERAL APPEARANCE: healthy, alert and no distress  BREAST: right breast normal without masses, tenderness or nipple discharge and no palpable axillary masses or adenopathy.  Left breast with ~1x1.5 cm firm non-mobile mass at 6 o'clock position of left breast  SKIN: deep ~1x2 cm non-tender irregular mass in left arm near antecubital area.  In left posterior neck there is well circumscribed tender superficial round mobile cyst.      ASSESSMENT/PLAN:         ICD-10-CM    1. Screening for prostate cancer Z12.5 PSA, screen   2. Essential hypertension I10 **Basic metabolic panel FUTURE anytime     **Lipid panel reflex to direct LDL FUTURE anytime     losartan (COZAAR) 50 MG tablet   3. Hyperlipidemia LDL goal <130 E78.5 **Basic metabolic panel FUTURE anytime     **Lipid panel reflex to direct LDL FUTURE anytime     losartan (COZAAR) 50 MG tablet     rosuvastatin (CRESTOR) 10 MG tablet   4. Gastroesophageal reflux disease without esophagitis K21.9 pantoprazole (PROTONIX) 40 MG EC tablet   5. Chest wall mass R22.2 US Breast Left Complete 4 Quadrants   6. Benign neoplasm of skin of trunk, except scrotum D23.5 GENERAL SURG ADULT " REFERRAL   7. Lipoma of skin and subcutaneous tissue D17.30      Mass on chest wall/breast area is likely benign, but needs to be r/o for breast cancer.  Mass in neck is likely sebaceous cyst.  Mass in arm is likely lipoma, observe.    Ashley Crawley,   [facility]

## 2017-08-03 ENCOUNTER — HOSPITAL ENCOUNTER (OUTPATIENT)
Dept: MAMMOGRAPHY | Facility: CLINIC | Age: 47
Discharge: HOME | End: 2017-08-03
Attending: INTERNAL MEDICINE | Admitting: INTERNAL MEDICINE
Payer: COMMERCIAL

## 2017-08-03 ENCOUNTER — HOSPITAL ENCOUNTER (OUTPATIENT)
Dept: ULTRASOUND IMAGING | Facility: CLINIC | Age: 47
End: 2017-08-03
Attending: INTERNAL MEDICINE
Payer: COMMERCIAL

## 2017-08-03 DIAGNOSIS — R22.2 CHEST WALL MASS: ICD-10-CM

## 2017-08-03 PROCEDURE — G0204 DX MAMMO INCL CAD BI: HCPCS

## 2017-08-03 PROCEDURE — 76642 ULTRASOUND BREAST LIMITED: CPT | Mod: LT

## 2017-08-30 ENCOUNTER — OFFICE VISIT (OUTPATIENT)
Dept: SURGERY | Facility: CLINIC | Age: 47
End: 2017-08-30
Payer: COMMERCIAL

## 2017-08-30 VITALS
HEART RATE: 75 BPM | SYSTOLIC BLOOD PRESSURE: 134 MMHG | HEIGHT: 75 IN | TEMPERATURE: 97.9 F | DIASTOLIC BLOOD PRESSURE: 92 MMHG | WEIGHT: 312 LBS | BODY MASS INDEX: 38.79 KG/M2

## 2017-08-30 DIAGNOSIS — L72.3 SEBACEOUS CYST: ICD-10-CM

## 2017-08-30 DIAGNOSIS — G89.18 POST-OP PAIN: Primary | ICD-10-CM

## 2017-08-30 PROCEDURE — 24075 EXC ARM/ELBOW LES SC < 3 CM: CPT | Mod: LT | Performed by: SURGERY

## 2017-08-30 PROCEDURE — 11421 EXC H-F-NK-SP B9+MARG 0.6-1: CPT | Performed by: SURGERY

## 2017-08-30 RX ORDER — HYDROCODONE BITARTRATE AND ACETAMINOPHEN 5; 325 MG/1; MG/1
1-2 TABLET ORAL EVERY 4 HOURS PRN
Qty: 20 TABLET | Refills: 0 | Status: SHIPPED | OUTPATIENT
Start: 2017-08-30 | End: 2018-06-07

## 2017-08-30 NOTE — PROGRESS NOTES
47-year-old male here complaining of posterior neck mass and left upper arm mass.      Procedure: Each mass excised using the same technique. Area injected with 1% lidocaine with epinephrine. Transverse or longitudinal incision made and subcutaneous tissues dissected to mass. Posterior neck mass was  a sebaceous cyst which was incised in its entirety including the entire cyst wall. Left upper arm mass was a lipoma diffuse and impinging upon the ulnar nerve. Both masses were excised sharply without difficulty. Skin closed using 4-0 Vicryl running subcuticular stitch and dressed with Dermabond. Each skin incision was 1 cm long.    Assessment and plan: Status post removal of 2 subcutaneous masses. Prescription written for Vicodin. Return to clinic as necessary.    Ciaran Aguiar MD

## 2017-08-30 NOTE — NURSING NOTE
"Initial BP (!) 134/92 (BP Location: Right arm, Patient Position: Chair, Cuff Size: Adult Regular)  Pulse 75  Temp 97.9  F (36.6  C) (Oral)  Ht 1.899 m (6' 2.75\")  Wt (!) 141.5 kg (312 lb)  BMI 39.26 kg/m2 Estimated body mass index is 39.26 kg/(m^2) as calculated from the following:    Height as of this encounter: 1.899 m (6' 2.75\").    Weight as of this encounter: 141.5 kg (312 lb). .    Candace Toscano MA    "

## 2017-08-30 NOTE — MR AVS SNAPSHOT
"              After Visit Summary   8/30/2017    Amarjit Malik    MRN: [id number]           Patient Information     Date Of Birth          1970        Visit Information        Provider Department      8/30/2017 7:30 AM Ciaran Aguiar MD [facility]        Today's Diagnoses     Post-op pain    -  1    Sebaceous cyst           Follow-ups after your visit        Who to contact     If you have questions or need follow up information about today's clinic visit or your schedule please contact [facility] directly at [phone].  Normal or non-critical lab and imaging results will be communicated to you by Mister Mariohart, letter or phone within 4 business days after the clinic has received the results. If you do not hear from us within 7 days, please contact the clinic through [employer] or phone. If you have a critical or abnormal lab result, we will notify you by phone as soon as possible.  Submit refill requests through Debteye or call your pharmacy and they will forward the refill request to us. Please allow 3 business days for your refill to be completed.          Additional Information About Your Visit        MyChart Information     Debteye gives you secure access to your electronic health record. If you see a primary care provider, you can also send messages to your care team and make appointments. If you have questions, please call your primary care clinic.  If you do not have a primary care provider, please call [phone] and they will assist you.        Care EveryWhere ID     This is your Care EveryWhere ID. This could be used by other organizations to access your [place] medical records  [id number]        Your Vitals Were     Pulse Temperature Height BMI (Body Mass Index)          75 97.9  F (36.6  C) (Oral) 1.899 m (6' 2.75\") 39.26 kg/m2         Blood Pressure from Last 3 Encounters:   08/30/17 (!) 134/92   07/07/17 123/84   06/30/17 (!) 148/96    Weight from Last 3 Encounters: "   08/30/17 (!) 141.5 kg (312 lb)   07/07/17 (!) 141.5 kg (312 lb)   06/30/17 (!) 140.3 kg (309 lb 3.2 oz)              Today, you had the following     No orders found for display         Today's Medication Changes          These changes are accurate as of: 8/30/17  8:21 AM.  If you have any questions, ask your nurse or doctor.               Start taking these medicines.        Dose/Directions    HYDROcodone-acetaminophen 5-325 MG per tablet   Commonly known as:  NORCO   Used for:  Post-op pain   Started by:  Ciaran Aguiar MD        Dose:  1-2 tablet   Take 1-2 tablets by mouth every 4 hours as needed for moderate to severe pain   Quantity:  20 tablet   Refills:  0            Where to get your medicines      Some of these will need a paper prescription and others can be bought over the counter.  Ask your nurse if you have questions.     Bring a paper prescription for each of these medications     HYDROcodone-acetaminophen 5-325 MG per tablet                Primary Care Provider Office Phone # Fax #    Ashley Jumana Crawley [phone] [address] [facility] [address]        Equal Access to Services     DEVIN BARNEY AH: Hadii mihir palacio hadasho Sokaitlynn, waaxda luqadaha, qaybta kaalmada adeegyada, he adams . So [facility] [phone].    ATENCIÓN: Si habla español, tiene a fontanez disposición servicios gratuitos de asistencia lingüística. Llame al [phone].    We comply with applicable federal civil rights laws and [place] laws. We do not discriminate on the basis of race, color, national origin, age, disability sex, sexual orientation or gender identity.            Thank you!     Thank you for choosing [facility]  for your care. Our goal is always to provide you with excellent care. Hearing back from our patients is one way we can continue to improve our services. Please take a few minutes to complete the written survey that you may receive in the mail  after your visit with us. Thank you!             Your Updated Medication List - Protect others around you: Learn how to safely use, store and throw away your medicines at www.disposemymeds.org.          This list is accurate as of: 8/30/17  8:21 AM.  Always use your most recent med list.                   Brand Name Dispense Instructions for use Diagnosis    busPIRone 15 MG tablet    BUSPAR    90 tablet    Take 1.5 tablets (22.5 mg) by mouth 2 times daily    ERIC (generalized anxiety disorder)       HYDROcodone-acetaminophen 5-325 MG per tablet    NORCO    20 tablet    Take 1-2 tablets by mouth every 4 hours as needed for moderate to severe pain    Post-op pain       hydrOXYzine 25 MG tablet    ATARAX    60 tablet    Take 25-50 mg up to three times daily as needed and  mg at night for sleep and anxiety.    ERIC (generalized anxiety disorder)       losartan 50 MG tablet    COZAAR    90 tablet    Take 1 tablet (50 mg) by mouth daily    Essential hypertension, Hyperlipidemia LDL goal <130       order for DME     1 each    Auto-CPAP: Max 15 cm H2O Min 8 cm H2O  Continuous  Lifetime need and heated humidity.    Hypersomnia with sleep apnea, unspecified       pantoprazole 40 MG EC tablet    PROTONIX    90 tablet    Take 1 tablet (40 mg) by mouth daily    Gastroesophageal reflux disease without esophagitis       rosuvastatin 10 MG tablet    CRESTOR    90 tablet    Take 1 tablet (10 mg) by mouth daily    Hyperlipidemia LDL goal <130       sertraline 100 MG tablet    ZOLOFT    180 tablet    Take 2 tablets (200 mg) by mouth daily    ERIC (generalized anxiety disorder)       TYLENOL PO      Take 1,000 mg by mouth 2 times daily

## 2017-11-20 ENCOUNTER — TELEPHONE (OUTPATIENT)
Dept: FAMILY MEDICINE | Facility: CLINIC | Age: 47
End: 2017-11-20

## 2017-11-21 NOTE — TELEPHONE ENCOUNTER
Voltaren/diclofenac not found in med history or current med list.  Called patient and unable to leave a message.    Bambi Becerra RN

## 2017-11-24 NOTE — TELEPHONE ENCOUNTER
I called pt.    He looked at the prescription.  It was written by Dr Dk Polk, 50 mg, 1 twice a day, 50 tabs with 0 refills on 9/22/17.  Dr Polk is with [facility] in [place], [phone]    Pt says that he uses diclofenac for back pain.    Last seen for back pain 2/16/16.    Attempted to reach pt but no answer and unable to leave a message.  He will need to ask for this from his ortho.    Adriana Grace RN

## 2017-11-30 ENCOUNTER — MYC MEDICAL ADVICE (OUTPATIENT)
Dept: SLEEP MEDICINE | Facility: CLINIC | Age: 47
End: 2017-11-30

## 2017-11-30 ENCOUNTER — MYC MEDICAL ADVICE (OUTPATIENT)
Dept: FAMILY MEDICINE | Facility: CLINIC | Age: 47
End: 2017-11-30

## 2017-11-30 DIAGNOSIS — G89.29 CHRONIC LOW BACK PAIN, UNSPECIFIED BACK PAIN LATERALITY, WITH SCIATICA PRESENCE UNSPECIFIED: Primary | ICD-10-CM

## 2017-11-30 DIAGNOSIS — G47.33 OSA (OBSTRUCTIVE SLEEP APNEA): Primary | ICD-10-CM

## 2017-11-30 DIAGNOSIS — M54.5 CHRONIC LOW BACK PAIN, UNSPECIFIED BACK PAIN LATERALITY, WITH SCIATICA PRESENCE UNSPECIFIED: Primary | ICD-10-CM

## 2017-11-30 NOTE — TELEPHONE ENCOUNTER
Pt is asking again for diclofenac(Voltaren) as prescribed from Dr Foss.  Previous message:  It was written by Dr Dk Polk, 50 mg, 1 twice a day, 50 tabs with 0 refills on 9/22/17.  Dr Polk is with [facility] in [place], [phone]     Pt says that he uses diclofenac for back pain.     Last seen for back pain at this clinic 2/16/16.    He requested diclofenac last week and I directed him to get it from his orthopedic provider.  Pt is requesting again?  Pt was last seen in clinic 7/7/17 for routine exam.  However, we have not been following with this clinic for back pain.    Since this is his second request, routed to provider.  Will you refill or does he need to be seen?    Adriana Grace RN

## 2017-12-04 NOTE — TELEPHONE ENCOUNTER
With his hypertension and history of GERD, the diclofenac can worsen both.  Be sure to monitor blood pressure and take the med with food.  Refill pended, no pharmacy chosen

## 2017-12-07 ENCOUNTER — DOCUMENTATION ONLY (OUTPATIENT)
Dept: SLEEP MEDICINE | Facility: CLINIC | Age: 47
End: 2017-12-07
Attending: INTERNAL MEDICINE
Payer: COMMERCIAL

## 2017-12-07 NOTE — PROGRESS NOTES
Patient was offered choice of vendor and chose [facility].  Patient Amarjit Malik was set up at [place] on December 7, 2017. Patient received a Resmed AirSense 10 Auto. Pressures were set at 8-15 cm H2O.   Patient s ramp is 5 cm H2O for Auto and FLEX/EPR is 2.  Patient received a Resmed Mask name: AIRFIT P10 Pillow mask Size Large, heated tubing and heated humidifier.  Patient is enrolled in the STM Program and does not need to meet compliance. Yasmine Panda

## 2017-12-11 ENCOUNTER — DOCUMENTATION ONLY (OUTPATIENT)
Dept: SLEEP MEDICINE | Facility: CLINIC | Age: 47
End: 2017-12-11

## 2017-12-22 ENCOUNTER — DOCUMENTATION ONLY (OUTPATIENT)
Dept: SLEEP MEDICINE | Facility: CLINIC | Age: 47
End: 2017-12-22

## 2017-12-22 NOTE — PROGRESS NOTES
14 DAY STM VISIT    Subjective measures:   This is a replacement for patient.  He is struggling with soreness from P10 pillows.  He previously was using the tomlin FX.     Assessment: Pt meeting objective benchmarks.  Patient failing following subjective benchmarks: mask discomfort  (soreness)  Action plan: pt to have 30 day STM visit.  Pt will reach out to DME to request a mask exchange.     Device type: Auto-CPAP  PAP settings: CPAP min 8 cm  H20     CPAP max 15 cm  H20    95th% pressure 10.7 cm  H20   Objective measures: 14 day rolling measures      Compliance  85 %      Leak  27.8 lpm  last  upload      AHI 1.09   last  upload      Average number of minutes 342        Objective measure goal  Compliance   Goal >70%  Leak   Goal < 24 lpm  AHI  Goal < 5  Usage  Goal >240

## 2018-01-08 ENCOUNTER — DOCUMENTATION ONLY (OUTPATIENT)
Dept: SLEEP MEDICINE | Facility: CLINIC | Age: 48
End: 2018-01-08

## 2018-01-08 NOTE — PROGRESS NOTES
30 DAY STM VISIT    Subjective measures:   Pt states things are going ok. He's struggling with his mask. He has an appointment to do a mask exchange tomorrow.    Assessment: Pt not meeting objective benchmarks for compliance  Patient failing following subjective benchmarks: mask discomfort   Action plan: 2 week STM recheck appt scheduled  Patient does not have a follow up visit with Edith Farnsworth NP  Device type: Auto-CPAP  PAP settings: CPAP min 8 cm  H20     CPAP max 15 cm  H20    95th% pressure 11 cm  H20   Objective measures: 14 day rolling measures      Compliance  50 %      Leak  33 lpm  last  upload      AHI 1.55   last  upload      Average number of minutes 291      Objective measure goal  Compliance   Goal >70%  Leak   Goal < 24 lpm  AHI  Goal < 5  Usage  Goal >240

## 2018-01-09 ENCOUNTER — APPOINTMENT (OUTPATIENT)
Dept: SLEEP MEDICINE | Facility: CLINIC | Age: 48
End: 2018-01-09
Payer: COMMERCIAL

## 2018-01-12 ENCOUNTER — MYC MEDICAL ADVICE (OUTPATIENT)
Dept: FAMILY MEDICINE | Facility: CLINIC | Age: 48
End: 2018-01-12

## 2018-01-12 DIAGNOSIS — F41.9 ANXIETY: Primary | ICD-10-CM

## 2018-01-23 ENCOUNTER — DOCUMENTATION ONLY (OUTPATIENT)
Dept: SLEEP MEDICINE | Facility: CLINIC | Age: 48
End: 2018-01-23
Payer: COMMERCIAL

## 2018-02-21 ENCOUNTER — MYC REFILL (OUTPATIENT)
Dept: FAMILY MEDICINE | Facility: CLINIC | Age: 48
End: 2018-02-21

## 2018-02-21 DIAGNOSIS — F41.1 GAD (GENERALIZED ANXIETY DISORDER): Chronic | ICD-10-CM

## 2018-02-22 NOTE — TELEPHONE ENCOUNTER
Message from [employer]:  Original authorizing provider: DO Amarjit Ritchie would like a refill of the following medications:  hydrOXYzine (ATARAX) 25 MG tablet [Ashley Crawley DO]    Preferred pharmacy: [facility] [place], [place] - [address] [address]    Comment:

## 2018-02-23 ENCOUNTER — MYC REFILL (OUTPATIENT)
Dept: FAMILY MEDICINE | Facility: CLINIC | Age: 48
End: 2018-02-23

## 2018-02-23 DIAGNOSIS — F41.1 GAD (GENERALIZED ANXIETY DISORDER): Chronic | ICD-10-CM

## 2018-02-23 RX ORDER — HYDROXYZINE HYDROCHLORIDE 25 MG/1
TABLET, FILM COATED ORAL
Qty: 60 TABLET | Refills: 6 | Status: SHIPPED | OUTPATIENT
Start: 2018-02-23 | End: 2018-06-07

## 2018-02-23 NOTE — TELEPHONE ENCOUNTER
Message from [employer]:  Original authorizing provider: DO Amarjit Ritchie would like a refill of the following medications:  hydrOXYzine (ATARAX) 25 MG tablet [Ashley Crawley DO]    Preferred pharmacy: [facility] [place], [place] - [address] [address]    Comment:

## 2018-02-23 NOTE — TELEPHONE ENCOUNTER
Last seen 7/7/17.  Prescription approved per [facility] Refill Protocol.  Adriana Grace RN

## 2018-02-23 NOTE — TELEPHONE ENCOUNTER
"Requested Prescriptions   Pending Prescriptions Disp Refills     hydrOXYzine (ATARAX) 25 MG tablet 60 tablet 6     Sig: Take 25-50 mg up to three times daily as needed and  mg at night for sleep and anxiety.    Antihistamines Protocol Passed    2/23/2018  3:17 PM       Passed - Patient is 3-64 years of age    Apply weight-based dosing for peds patients age 3 - 12 years of age.    Forward request to provider for patients under the age of 3 or over the age of 64.         Passed - Recent or future visit with authorizing provider's specialty    Patient had office visit in the last year or has a visit in the next 30 days with authorizing provider.  See \"Patient Info\" tab in inbasket, or \"Choose Columns\" in Meds & Orders section of the refill encounter.               "

## 2018-02-27 ENCOUNTER — OFFICE VISIT (OUTPATIENT)
Dept: PSYCHIATRY | Facility: CLINIC | Age: 48
End: 2018-02-27
Payer: COMMERCIAL

## 2018-02-27 VITALS
DIASTOLIC BLOOD PRESSURE: 85 MMHG | BODY MASS INDEX: 39.51 KG/M2 | TEMPERATURE: 96.4 F | WEIGHT: 314 LBS | HEART RATE: 86 BPM | SYSTOLIC BLOOD PRESSURE: 115 MMHG

## 2018-02-27 DIAGNOSIS — F41.1 GAD (GENERALIZED ANXIETY DISORDER): Primary | ICD-10-CM

## 2018-02-27 PROCEDURE — 99214 OFFICE O/P EST MOD 30 MIN: CPT | Performed by: CLINICAL NURSE SPECIALIST

## 2018-02-27 RX ORDER — ESCITALOPRAM OXALATE 10 MG/1
10 TABLET ORAL DAILY
Qty: 30 TABLET | Refills: 1 | Status: SHIPPED | OUTPATIENT
Start: 2018-02-27 | End: 2018-05-07

## 2018-02-27 ASSESSMENT — ANXIETY QUESTIONNAIRES
1. FEELING NERVOUS, ANXIOUS, OR ON EDGE: MORE THAN HALF THE DAYS
4. TROUBLE RELAXING: MORE THAN HALF THE DAYS
IF YOU CHECKED OFF ANY PROBLEMS ON THIS QUESTIONNAIRE, HOW DIFFICULT HAVE THESE PROBLEMS MADE IT FOR YOU TO DO YOUR WORK, TAKE CARE OF THINGS AT HOME, OR GET ALONG WITH OTHER PEOPLE: NOT DIFFICULT AT ALL
3. WORRYING TOO MUCH ABOUT DIFFERENT THINGS: MORE THAN HALF THE DAYS
GAD7 TOTAL SCORE: 12
6. BECOMING EASILY ANNOYED OR IRRITABLE: MORE THAN HALF THE DAYS
2. NOT BEING ABLE TO STOP OR CONTROL WORRYING: MORE THAN HALF THE DAYS
5. BEING SO RESTLESS THAT IT IS HARD TO SIT STILL: MORE THAN HALF THE DAYS
7. FEELING AFRAID AS IF SOMETHING AWFUL MIGHT HAPPEN: NOT AT ALL

## 2018-02-27 NOTE — PROGRESS NOTES
Psychiatric Progress Note    Name: Amarjit Malik  Date: 2/27/2018  Length of Visit: Spent 30 minutes face to face with this patient, where at least 50 % of this time was spent in counseling on/or about anxiety management.     MRN: [id number]      Current Outpatient Prescriptions   Medication Sig     hydrOXYzine (ATARAX) 25 MG tablet Take 25-50 mg up to three times daily as needed and  mg at night for sleep and anxiety.     diclofenac (VOLTAREN) 50 MG EC tablet Take 1 tablet (50 mg) by mouth 2 times daily as needed for moderate pain     losartan (COZAAR) 50 MG tablet Take 1 tablet (50 mg) by mouth daily     pantoprazole (PROTONIX) 40 MG EC tablet Take 1 tablet (40 mg) by mouth daily     rosuvastatin (CRESTOR) 10 MG tablet Take 1 tablet (10 mg) by mouth daily     sertraline (ZOLOFT) 100 MG tablet Take 2 tablets (200 mg) by mouth daily     busPIRone (BUSPAR) 15 MG tablet Take 1.5 tablets (22.5 mg) by mouth 2 times daily     Acetaminophen (TYLENOL PO) Take 1,000 mg by mouth 2 times daily     HYDROcodone-acetaminophen (NORCO) 5-325 MG per tablet Take 1-2 tablets by mouth every 4 hours as needed for moderate to severe pain (Patient not taking: Reported on 2/27/2018)     ORDER FOR DME Auto-CPAP:  Max 15 cm H2O  Min 8 cm H2O    Continuous    Lifetime need and heated humidity.        No current facility-administered medications for this visit.        Therapist:  None    PHQ-9:  PHQ-9 score:    PHQ-9 SCORE 6/30/2017   Total Score -   Total Score 3       ERIC-7:  ERIC-7 SCORE 9/14/2016 6/30/2017 2/27/2018   Total Score - - -   Total Score 7 2 12           Interim History:  Patient was last seen for an appointment 6-. Sertraline (Zoloft) 200 mg daily, hydroxyzine 25-50 mg three times daily as needed for anxiety were continued. Patient returned to seeing PCP with option to restart buspirone (Buspar) if needed for anxiety. Patient phoned on 1- reporting anxiety symptoms, was advised to check in with PCP  "who referred patient back for appointment today.     Patient reports having a good week, then has a bad week related to increasing and decreasing anxiety. Patient reports restarting the buspirone (Buspar) 15 mg daily a month ago. Patient is advised to increase to twice daily dosing.     Patient reports compliance using CPAP. Patient reports sleeping \"pretty good\".       Past Medical History:   Diagnosis Date     Anxiety      Gastro-oesophageal reflux disease      Obesity (BMI 30-39.9)      Obstructive sleep apnea (adult) (pediatric)      panic attacks, and anxiety  1/5/2006    stable on paxil     PONV (postoperative nausea and vomiting)      Pure hypercholesterolemia      Unspecified essential hypertension        10 point ROS is negative except for those listed above.     Vital Signs:   /85 (BP Location: Left arm, Patient Position: Sitting, Cuff Size: Adult Regular)  Pulse 86  Temp 96.4  F (35.8  C) (Tympanic)  Wt (!) 314 lb (142.4 kg)  BMI 39.51 kg/m2      Mental Status Assessment:  Appearance:  Well groomed      Behavior/relationship to examiner/demeanor:  Cooperative, engaged and pleasant  Motor activity:  Normal  Gait:  Normal   Speech:  Normal in volume, articulation, coherence   Mood (subjective report):  \"Anxious\"  Affect (objective appearance):  Mood congruent  Thought Process (Associations):  Logical, linear and goal directed  Thought content:  No evidence of suicidal or homicidal ideation,          no overt psychosis and                    patient does not appear to be responding to internal stimuli  Oriented to person, place, date/time   Attention Span and concentration: Intact   Memory:  Short-term memory intact and Long-term memory; Intact  Language:  Fluent   Fund of Knowledge/Intelligence:  Average  Use of language: Intact   Abstraction:  Normal  Insight:  Adequate  Judgment:  Adequate for safety    DSM DIAGNOSIS:  300.01 (F41.0) Panic Disorder  300.02 (F41.1) Generalized Anxiety " Disorder    Assessment:  Sertraline (Zoloft) is no longer as helpful for anxiety symptoms. SSRIs have been helpful for many years and will utilize escitalopram (Lexapro).     Medication side effects and alternatives were reviewed.     Treatment Plan:  Taper to discontinue sertraline (Zoloft) by reducing to 100 mg daily for one week, then discontinue.     While taking sertraline (Zoloft) 100 mg daily, start escitalopram (Lexapro) 10 mg daily.     Continue buspirone (Buspar) 15 mg, but increase to twice daily dosing.     Continue hydroxyzine 25-50 mg three times daily and  mg at bedtime.     Follow up in one month.     - Recommend patient discuss medications with their pharmacist. Risks and benefits for medications were discussed including, but not limited to, side effects.   - Safety plan was reviewed; to the ER as needed or call after hours crisis line; [phone]  - Education and counseling was done regarding use of medications, psychotherapy options  - Call [phone] for appointment or to speak to a nurse.    -Office hours: Monday through Thursday 8:00 am to 4:30 pm.   - Patient received a copy of this Treatment Plan today.    Patient will continue to be seen for ongoing consultation and stabilization.      Signed:  Nolvia Katz, RN, MS, CNS-BC

## 2018-02-27 NOTE — PATIENT INSTRUCTIONS
Treatment Plan:  Taper to discontinue sertraline (Zoloft) by reducing to 100 mg daily for one week, then discontinue.     While taking sertraline (Zoloft) 100 mg daily, start escitalopram (Lexapro) 10 mg daily.     Continue buspirone (Buspar) 15 mg, but increase to twice daily dosing.     Continue hydroxyzine 25-50 mg three times daily and  mg at bedtime.     Follow up in one month.     - Recommend patient discuss medications with their pharmacist. Risks and benefits for medications were discussed including, but not limited to, side effects.   - Safety plan was reviewed; to the ER as needed or call after hours crisis line; [phone]  - Education and counseling was done regarding use of medications, psychotherapy options  - Call [phone] for appointment or to speak to a nurse.    -Office hours: Monday through Thursday 8:00 am to 4:30 pm.

## 2018-02-27 NOTE — MR AVS SNAPSHOT
After Visit Summary   2/27/2018    Amarjit Malik    MRN: [id number]           Patient Information     Date Of Birth          1970        Visit Information        Provider Department      2/27/2018 8:45 AM Nolvia Katz APRN [facility]        Today's Diagnoses     ERIC (generalized anxiety disorder)    -  1      Care Instructions    Treatment Plan:  Taper to discontinue sertraline (Zoloft) by reducing to 100 mg daily for one week, then discontinue.     While taking sertraline (Zoloft) 100 mg daily, start escitalopram (Lexapro) 10 mg daily.     Continue buspirone (Buspar) 15 mg, but increase to twice daily dosing.     Continue hydroxyzine 25-50 mg three times daily and  mg at bedtime.     Follow up in one month.     - Recommend patient discuss medications with their pharmacist. Risks and benefits for medications were discussed including, but not limited to, side effects.   - Safety plan was reviewed; to the ER as needed or call after hours crisis line; [phone]  - Education and counseling was done regarding use of medications, psychotherapy options  - Call [phone] for appointment or to speak to a nurse.    -Office hours: Monday through Thursday 8:00 am to 4:30 pm.           Follow-ups after your visit        Who to contact     If you have questions or need follow up information about today's clinic visit or your schedule please contact [facility] directly at [phone].  Normal or non-critical lab and imaging results will be communicated to you by MyChart, letter or phone within 4 business days after the clinic has received the results. If you do not hear from us within 7 days, please contact the clinic through [employer]hart or phone. If you have a critical or abnormal lab result, we will notify you by phone as soon as possible.  Submit refill requests through iOpener or call your pharmacy and they will forward the refill request to us. Please  allow 3 business days for your refill to be completed.          Additional Information About Your Visit        MyChart Information     tritruehart gives you secure access to your electronic health record. If you see a primary care provider, you can also send messages to your care team and make appointments. If you have questions, please call your primary care clinic.  If you do not have a primary care provider, please call [phone] and they will assist you.        Care EveryWhere ID     This is your Care EveryWhere ID. This could be used by other organizations to access your [place] medical records  [id number]        Your Vitals Were     Pulse Temperature BMI (Body Mass Index)             86 96.4  F (35.8  C) (Tympanic) 39.51 kg/m2          Blood Pressure from Last 3 Encounters:   02/27/18 115/85   08/30/17 (!) 134/92   07/07/17 123/84    Weight from Last 3 Encounters:   02/27/18 (!) 314 lb (142.4 kg)   08/30/17 (!) 312 lb (141.5 kg)   07/07/17 (!) 312 lb (141.5 kg)              Today, you had the following     No orders found for display         Today's Medication Changes          These changes are accurate as of 2/27/18  9:28 AM.  If you have any questions, ask your nurse or doctor.               Start taking these medicines.        Dose/Directions    escitalopram 10 MG tablet   Commonly known as:  LEXAPRO   Used for:  ERIC (generalized anxiety disorder)   Started by:  Nolvia Katz APRN CNS        Dose:  10 mg   Take 1 tablet (10 mg) by mouth daily   Quantity:  30 tablet   Refills:  1         Stop taking these medicines if you haven't already. Please contact your care team if you have questions.     sertraline 100 MG tablet   Commonly known as:  ZOLOFT   Stopped by:  Nolvia Katz APRN CNS                Where to get your medicines      These medications were sent to [facility] - [address]  [address] [address] [address]     Phone:   [phone]     escitalopram 10 MG tablet                Primary Care Provider Office Phone # Fax #    Ashley Crawley [phone] [address] [facility] [address]        Equal Access to Services     ASTER BARNEY : Hadii mihir ku haddarinelo Soomaali, waaxda luqadaha, qaybta kaalmada adeegyada, waxalejo idiin haygelyn adeerum whitt bryan estevez. So [facility] [phone].    ATENCIÓN: Si habla español, tiene a fontanez disposición servicios gratuitos de asistencia lingüística. Llame al [phone].    We comply with applicable federal civil rights laws and [place] laws. We do not discriminate on the basis of race, color, national origin, age, disability, sex, sexual orientation, or gender identity.            Thank you!     Thank you for choosing [facility]  for your care. Our goal is always to provide you with excellent care. Hearing back from our patients is one way we can continue to improve our services. Please take a few minutes to complete the written survey that you may receive in the mail after your visit with us. Thank you!             Your Updated Medication List - Protect others around you: Learn how to safely use, store and throw away your medicines at www.disposemymeds.org.          This list is accurate as of 2/27/18  9:28 AM.  Always use your most recent med list.                   Brand Name Dispense Instructions for use Diagnosis    busPIRone 15 MG tablet    BUSPAR    90 tablet    Take 1.5 tablets (22.5 mg) by mouth 2 times daily    ERIC (generalized anxiety disorder)       diclofenac 50 MG EC tablet    VOLTAREN    60 tablet    Take 1 tablet (50 mg) by mouth 2 times daily as needed for moderate pain    Chronic low back pain, unspecified back pain laterality, with sciatica presence unspecified       escitalopram 10 MG tablet    LEXAPRO    30 tablet    Take 1 tablet (10 mg) by mouth daily    ERIC (generalized anxiety disorder)       HYDROcodone-acetaminophen 5-325 MG per tablet     NORCO    20 tablet    Take 1-2 tablets by mouth every 4 hours as needed for moderate to severe pain    Post-op pain       hydrOXYzine 25 MG tablet    ATARAX    60 tablet    Take 25-50 mg up to three times daily as needed and  mg at night for sleep and anxiety.    ERIC (generalized anxiety disorder)       losartan 50 MG tablet    COZAAR    90 tablet    Take 1 tablet (50 mg) by mouth daily    Essential hypertension, Hyperlipidemia LDL goal <130       order for DME     1 each    Auto-CPAP: Max 15 cm H2O Min 8 cm H2O  Continuous  Lifetime need and heated humidity.    Hypersomnia with sleep apnea, unspecified       pantoprazole 40 MG EC tablet    PROTONIX    90 tablet    Take 1 tablet (40 mg) by mouth daily    Gastroesophageal reflux disease without esophagitis       rosuvastatin 10 MG tablet    CRESTOR    90 tablet    Take 1 tablet (10 mg) by mouth daily    Hyperlipidemia LDL goal <130       TYLENOL PO      Take 1,000 mg by mouth 2 times daily

## 2018-02-27 NOTE — NURSING NOTE
"Chief Complaint   Patient presents with     Recheck Medication       Initial /85 (BP Location: Left arm, Patient Position: Sitting, Cuff Size: Adult Regular)  Pulse 86  Temp 96.4  F (35.8  C) (Tympanic)  Wt (!) 314 lb (142.4 kg)  BMI 39.51 kg/m2 Estimated body mass index is 39.51 kg/(m^2) as calculated from the following:    Height as of 8/30/17: 6' 2.75\" (1.899 m).    Weight as of this encounter: 314 lb (142.4 kg).  Medication Reconciliation: complete    "

## 2018-02-28 RX ORDER — HYDROXYZINE HYDROCHLORIDE 25 MG/1
TABLET, FILM COATED ORAL
Qty: 60 TABLET | Refills: 6 | OUTPATIENT
Start: 2018-02-28

## 2018-02-28 ASSESSMENT — ANXIETY QUESTIONNAIRES: GAD7 TOTAL SCORE: 12

## 2018-02-28 ASSESSMENT — PATIENT HEALTH QUESTIONNAIRE - PHQ9: SUM OF ALL RESPONSES TO PHQ QUESTIONS 1-9: 5

## 2018-05-07 ENCOUNTER — MYC REFILL (OUTPATIENT)
Dept: PSYCHIATRY | Facility: CLINIC | Age: 48
End: 2018-05-07

## 2018-05-07 DIAGNOSIS — F41.1 GAD (GENERALIZED ANXIETY DISORDER): ICD-10-CM

## 2018-05-08 RX ORDER — ESCITALOPRAM OXALATE 10 MG/1
10 TABLET ORAL DAILY
Qty: 30 TABLET | Refills: 0 | Status: SHIPPED | OUTPATIENT
Start: 2018-05-08 | End: 2018-05-30

## 2018-05-08 NOTE — TELEPHONE ENCOUNTER
Message from [employer]:  Original authorizing provider: ELICEO Chatterjee would like a refill of the following medications:  escitalopram (LEXAPRO) 10 MG tablet [ELICEO Chatterjee]    Preferred pharmacy: [facility] [address] [place] [address] [address] [facility] [address] [place] [facility] [place]    Comment:  I am out of town on business please if I can get 2 months at minimum of refills. 90 day scripts would be even better. thank you.

## 2018-05-30 ENCOUNTER — MYC MEDICAL ADVICE (OUTPATIENT)
Dept: FAMILY MEDICINE | Facility: CLINIC | Age: 48
End: 2018-05-30

## 2018-05-30 DIAGNOSIS — F41.1 GAD (GENERALIZED ANXIETY DISORDER): ICD-10-CM

## 2018-05-30 RX ORDER — ESCITALOPRAM OXALATE 20 MG/1
20 TABLET ORAL DAILY
Qty: 30 TABLET | Refills: 1 | Status: SHIPPED | OUTPATIENT
Start: 2018-05-30 | End: 2018-06-07

## 2018-05-30 NOTE — TELEPHONE ENCOUNTER
Okay to increase to escitalopram (Lexapro) 20 mg daily. Please arrange for Rx for 2 months. Thanks.

## 2018-06-06 ENCOUNTER — DOCUMENTATION ONLY (OUTPATIENT)
Dept: SLEEP MEDICINE | Facility: CLINIC | Age: 48
End: 2018-06-06

## 2018-06-06 NOTE — PROGRESS NOTES
6 month [facility] Recheck Visit     Data only recheck     Assessment: Pt meeting objective benchmarks.     Action plan: pt to follow up per provider request (1-2 yrs)    Device type: Auto-CPAP  PAP settings: CPAP min 8 cm  H20     CPAP max 15 cm  H20    95th% pressure 10.9 cm  H20   Objective measures: 14 day rolling measures      Compliance  78 %      Leak  19.96 lpm  last  upload      AHI 1.15   last  upload      Average number of minutes 364      Objective measure goal  Compliance   Goal >70%  Leak   Goal < 24 lpm  AHI  Goal < 5  Usage  Goal >240

## 2018-06-07 ENCOUNTER — OFFICE VISIT (OUTPATIENT)
Dept: FAMILY MEDICINE | Facility: CLINIC | Age: 48
End: 2018-06-07
Payer: COMMERCIAL

## 2018-06-07 VITALS
HEART RATE: 72 BPM | WEIGHT: 315 LBS | SYSTOLIC BLOOD PRESSURE: 134 MMHG | DIASTOLIC BLOOD PRESSURE: 86 MMHG | TEMPERATURE: 98.2 F | BODY MASS INDEX: 39.64 KG/M2

## 2018-06-07 DIAGNOSIS — F41.1 GAD (GENERALIZED ANXIETY DISORDER): Chronic | ICD-10-CM

## 2018-06-07 PROCEDURE — 99214 OFFICE O/P EST MOD 30 MIN: CPT | Performed by: CLINICAL NURSE SPECIALIST

## 2018-06-07 RX ORDER — ESCITALOPRAM OXALATE 20 MG/1
20 TABLET ORAL DAILY
Qty: 90 TABLET | Refills: 1 | Status: SHIPPED | OUTPATIENT
Start: 2018-06-07 | End: 2018-07-31

## 2018-06-07 RX ORDER — HYDROXYZINE HYDROCHLORIDE 25 MG/1
TABLET, FILM COATED ORAL
Qty: 60 TABLET | Refills: 6 | Status: SHIPPED | OUTPATIENT
Start: 2018-06-07 | End: 2018-07-31

## 2018-06-07 ASSESSMENT — ANXIETY QUESTIONNAIRES
1. FEELING NERVOUS, ANXIOUS, OR ON EDGE: NEARLY EVERY DAY
GAD7 TOTAL SCORE: 14
5. BEING SO RESTLESS THAT IT IS HARD TO SIT STILL: SEVERAL DAYS
IF YOU CHECKED OFF ANY PROBLEMS ON THIS QUESTIONNAIRE, HOW DIFFICULT HAVE THESE PROBLEMS MADE IT FOR YOU TO DO YOUR WORK, TAKE CARE OF THINGS AT HOME, OR GET ALONG WITH OTHER PEOPLE: SOMEWHAT DIFFICULT
7. FEELING AFRAID AS IF SOMETHING AWFUL MIGHT HAPPEN: MORE THAN HALF THE DAYS
3. WORRYING TOO MUCH ABOUT DIFFERENT THINGS: MORE THAN HALF THE DAYS
4. TROUBLE RELAXING: MORE THAN HALF THE DAYS
2. NOT BEING ABLE TO STOP OR CONTROL WORRYING: MORE THAN HALF THE DAYS
6. BECOMING EASILY ANNOYED OR IRRITABLE: MORE THAN HALF THE DAYS

## 2018-06-07 NOTE — MR AVS SNAPSHOT
After Visit Summary   6/7/2018    Amarjit Malik    MRN: [id number]           Patient Information     Date Of Birth          1970        Visit Information        Provider Department      6/7/2018 8:45 AM Nolvia Katz APRN [facility]        Today's Diagnoses     ERIC (generalized anxiety disorder)          Care Instructions    Treatment Plan:    Continue escitalopram (Lexapro) 20 mg daily and hydroxyzine 25-50 three times daily as needed for anxiety.     Follow up with primary care provider 2-3 months. Patient is aware of my departure from this clinic.     - Recommend patient discuss medications with their pharmacist. Risks and benefits for medications were discussed including, but not limited to, side effects.   - Safety plan was reviewed; to the ER as needed or call after hours crisis line; [phone]  - Education and counseling was done regarding use of medications, psychotherapy options  - Call [phone] for appointment or to speak to a nurse.    -Office hours: Monday through Thursday 8:00 am to 4:30 pm.             Follow-ups after your visit        Who to contact     If you have questions or need follow up information about today's clinic visit or your schedule please contact [facility] directly at [phone].  Normal or non-critical lab and imaging results will be communicated to you by "[employer]hart, letter or phone within 4 business days after the clinic has received the results. If you do not hear from us within 7 days, please contact the clinic through [facility] or phone. If you have a critical or abnormal lab result, we will notify you by phone as soon as possible.  Submit refill requests through [employer] or call your pharmacy and they will forward the refill request to us. Please allow 3 business days for your refill to be completed.          Additional Information About Your Visit        "[employer]hart Information     [employer] gives you  secure access to your electronic health record. If you see a primary care provider, you can also send messages to your care team and make appointments. If you have questions, please call your primary care clinic.  If you do not have a primary care provider, please call [phone] and they will assist you.        Care EveryWhere ID     This is your Care EveryWhere ID. This could be used by other organizations to access your [place] medical records  [id number]        Your Vitals Were     Pulse Temperature BMI (Body Mass Index)             72 98.2  F (36.8  C) (Tympanic) 39.64 kg/m2          Blood Pressure from Last 3 Encounters:   06/07/18 134/86   02/27/18 115/85   08/30/17 (!) 134/92    Weight from Last 3 Encounters:   06/07/18 315 lb (142.9 kg)   02/27/18 (!) 314 lb (142.4 kg)   08/30/17 (!) 312 lb (141.5 kg)              Today, you had the following     No orders found for display         Today's Medication Changes          These changes are accurate as of 6/7/18  9:15 AM.  If you have any questions, ask your nurse or doctor.               Stop taking these medicines if you haven't already. Please contact your care team if you have questions.     HYDROcodone-acetaminophen 5-325 MG per tablet   Commonly known as:  NORCO   Stopped by:  Nolvia Katz APRN CNS                Where to get your medicines      These medications were sent to [id number] [address] [place] [address], [place] [address]     Phone:  [phone]     escitalopram 20 MG tablet    hydrOXYzine 25 MG tablet                Primary Care Provider Office Phone # Fax #    Ashley Jumana Crawley [phone] [address] [facility] [address]        Equal Access to Services     ASTER BARNEY AH: Gloria chávezo Sokaitlynn, waaxda luqadaha, qaybta kaalmada adeegyada, he adams . So [facility]  [phone].    ATENCIÓN: Si dino alatorre, tiene a fontanez disposición servicios gratuitos de asistencia lingüística. Kristyn yanes [phone].    We comply with applicable federal civil rights laws and [place] laws. We do not discriminate on the basis of race, color, national origin, age, disability, sex, sexual orientation, or gender identity.            Thank you!     Thank you for choosing [facility]  for your care. Our goal is always to provide you with excellent care. Hearing back from our patients is one way we can continue to improve our services. Please take a few minutes to complete the written survey that you may receive in the mail after your visit with us. Thank you!             Your Updated Medication List - Protect others around you: Learn how to safely use, store and throw away your medicines at www.disposemymeds.org.          This list is accurate as of 6/7/18  9:15 AM.  Always use your most recent med list.                   Brand Name Dispense Instructions for use Diagnosis    diclofenac 50 MG EC tablet    VOLTAREN    60 tablet    Take 1 tablet (50 mg) by mouth 2 times daily as needed for moderate pain    Chronic low back pain, unspecified back pain laterality, with sciatica presence unspecified       escitalopram 20 MG tablet    LEXAPRO    90 tablet    Take 1 tablet (20 mg) by mouth daily    ERIC (generalized anxiety disorder)       hydrOXYzine 25 MG tablet    ATARAX    60 tablet    Take 25-50 mg up to three times daily as needed and  mg at night for sleep and anxiety.    ERIC (generalized anxiety disorder)       losartan 50 MG tablet    COZAAR    90 tablet    Take 1 tablet (50 mg) by mouth daily    Essential hypertension, Hyperlipidemia LDL goal <130       order for DME     1 each    Auto-CPAP: Max 15 cm H2O Min 8 cm H2O  Continuous  Lifetime need and heated humidity.    Hypersomnia with sleep apnea, unspecified       pantoprazole 40 MG EC tablet    PROTONIX    90 tablet     Take 1 tablet (40 mg) by mouth daily    Gastroesophageal reflux disease without esophagitis       rosuvastatin 10 MG tablet    CRESTOR    90 tablet    Take 1 tablet (10 mg) by mouth daily    Hyperlipidemia LDL goal <130       TYLENOL PO      Take 1,000 mg by mouth 2 times daily

## 2018-06-07 NOTE — PROGRESS NOTES
Psychiatric Progress Note    Name: Amarjit Malik  Date: 6/7/2018  Length of Visit: Spent 30 minutes face to face with this patient, where at least 50 % of this time was spent in counseling on/or about empty nest.     MRN: [id number]      Current Outpatient Prescriptions   Medication Sig     Acetaminophen (TYLENOL PO) Take 1,000 mg by mouth 2 times daily     diclofenac (VOLTAREN) 50 MG EC tablet Take 1 tablet (50 mg) by mouth 2 times daily as needed for moderate pain     escitalopram (LEXAPRO) 20 MG tablet Take 1 tablet (20 mg) by mouth daily     hydrOXYzine (ATARAX) 25 MG tablet Take 25-50 mg up to three times daily as needed and  mg at night for sleep and anxiety.     losartan (COZAAR) 50 MG tablet Take 1 tablet (50 mg) by mouth daily     pantoprazole (PROTONIX) 40 MG EC tablet Take 1 tablet (40 mg) by mouth daily     rosuvastatin (CRESTOR) 10 MG tablet Take 1 tablet (10 mg) by mouth daily     busPIRone (BUSPAR) 15 MG tablet Take 1.5 tablets (22.5 mg) by mouth 2 times daily (Patient not taking: Reported on 6/7/2018)     ORDER FOR DME Auto-CPAP:  Max 15 cm H2O  Min 8 cm H2O    Continuous    Lifetime need and heated humidity.        No current facility-administered medications for this visit.        Therapist:  None    PHQ-9:  PHQ-9 score:    PHQ-9 SCORE 2/27/2018   Total Score -   Total Score 5       ERIC-7:  ERIC-7 SCORE 6/30/2017 2/27/2018 6/7/2018   Total Score - - -   Total Score 2 12 14           Interim History:  Patient returns for follow up from appointment 2-. Sertraline (Zoloft) was tapered to discontinue, concurrently starting escitalopram (Lexapro) 10 mg daily. Bupropion (Wellbutrin) 15 mg twice daily and hydroxyzine 25-50 mg three times daily as needed for anxiety were continued. Patient was to follow up in one month.     Patient reports switching from sertraline (Zoloft) to escitalopram (Lexapro) went fine, found the escitalopram (Lexapro) 10 mg daily wasn't enough and is taking 20 mg  "for the past 4-5 days.     Patient reports youngest child is graduating from high school. Patient reports concern for \"empty nest\". We discussed this at length.    Past Medical History:   Diagnosis Date     Anxiety      Gastro-oesophageal reflux disease      Obesity (BMI 30-39.9)      Obstructive sleep apnea (adult) (pediatric)      panic attacks, and anxiety  1/5/2006    stable on paxil     PONV (postoperative nausea and vomiting)      Pure hypercholesterolemia      Unspecified essential hypertension        10 point ROS is negative except for those listed above.     Vital Signs:   /86 (BP Location: Right arm, Patient Position: Sitting, Cuff Size: Adult Regular)  Pulse 72  Temp 98.2  F (36.8  C) (Tympanic)  Wt 315 lb (142.9 kg)  BMI 39.64 kg/m2      Mental Status Assessment:  Appearance:  Well groomed      Behavior/relationship to examiner/demeanor:  Cooperative, engaged and pleasant  Motor activity:  Normal  Gait:  Normal   Speech:  Normal in volume, articulation, coherence   Mood (subjective report):  \"Getting better\"  Affect (objective appearance):  Mood congruent  Thought Process (Associations):  Logical, linear and goal directed  Thought content:  No evidence of suicidal or homicidal ideation,          no overt psychosis and                    patient does not appear to be responding to internal stimuli  Oriented to person, place, date/time   Attention Span and concentration: Intact   Memory:  Short-term memory intact and Long-term memory; Intact  Language:  Fluent   Fund of Knowledge/Intelligence:  Average  Use of language: Intact   Abstraction:  Normal  Insight:  Adequate  Judgment:  Adequate for safety    DSM DIAGNOSIS:  300.01 (F41.0) Panic Disorder  300.02 (F41.1) Generalized Anxiety Disorder       Assessment:  Patient is noticing improvement in symptoms with the increase in escitalopram (Lexapro).     Medication side effects and alternatives were reviewed.     Treatment Plan:    Continue " escitalopram (Lexapro) 20 mg daily and hydroxyzine 25-50 three times daily as needed for anxiety.     Follow up with primary care provider 2-3 months. Patient is aware of my departure from this clinic.     - Recommend patient discuss medications with their pharmacist. Risks and benefits for medications were discussed including, but not limited to, side effects.   - Safety plan was reviewed; to the ER as needed or call after hours crisis line; [phone]  - Education and counseling was done regarding use of medications, psychotherapy options  - Call [phone] for appointment or to speak to a nurse.    -Office hours: Monday through Thursday 8:00 am to 4:30 pm.   - Patient received a copy of this Treatment Plan today.    The patient is being returned to the referring provider for ongoing care and medication prescribing.  The patient can be referred back to this service for further consultation as needed.      Signed:  Nolvia Katz RN, MS, CNS-BC

## 2018-06-07 NOTE — PATIENT INSTRUCTIONS
Treatment Plan:    Continue escitalopram (Lexapro) 20 mg daily and hydroxyzine 25-50 three times daily as needed for anxiety.     Follow up with primary care provider 2-3 months. Patient is aware of my departure from this clinic.     - Recommend patient discuss medications with their pharmacist. Risks and benefits for medications were discussed including, but not limited to, side effects.   - Safety plan was reviewed; to the ER as needed or call after hours crisis line; [phone]  - Education and counseling was done regarding use of medications, psychotherapy options  - Call [phone] for appointment or to speak to a nurse.    -Office hours: Monday through Thursday 8:00 am to 4:30 pm.

## 2018-06-08 ASSESSMENT — ANXIETY QUESTIONNAIRES: GAD7 TOTAL SCORE: 14

## 2018-06-08 ASSESSMENT — PATIENT HEALTH QUESTIONNAIRE - PHQ9: SUM OF ALL RESPONSES TO PHQ QUESTIONS 1-9: 6

## 2018-07-06 DIAGNOSIS — K21.9 GASTROESOPHAGEAL REFLUX DISEASE WITHOUT ESOPHAGITIS: ICD-10-CM

## 2018-07-06 NOTE — TELEPHONE ENCOUNTER
"Requested Prescriptions   Pending Prescriptions Disp Refills     pantoprazole (PROTONIX) 40 MG EC tablet  Last Written Prescription Date:  07/07/17  Last Fill Quantity: 90,  # refills: 3   Last office visit: 6/7/2018 with prescribing provider:  06/07/18   Future Office Visit:     90 tablet 3     Sig: Take 1 tablet (40 mg) by mouth daily    PPI Protocol Passed    7/6/2018 10:19 AM       Passed - Not on Clopidogrel (unless Pantoprazole ordered)       Passed - No diagnosis of osteoporosis on record       Passed - Recent (12 mo) or future (30 days) visit within the authorizing provider's specialty    Patient had office visit in the last 12 months or has a visit in the next 30 days with authorizing provider or within the authorizing provider's specialty.  See \"Patient Info\" tab in inbasket, or \"Choose Columns\" in Meds & Orders section of the refill encounter.           Passed - Patient is age 18 or older          "

## 2018-07-10 RX ORDER — PANTOPRAZOLE SODIUM 40 MG/1
40 TABLET, DELAYED RELEASE ORAL DAILY
Qty: 30 TABLET | Refills: 0 | Status: SHIPPED | OUTPATIENT
Start: 2018-07-10 | End: 2018-07-31

## 2018-07-31 ENCOUNTER — OFFICE VISIT (OUTPATIENT)
Dept: FAMILY MEDICINE | Facility: CLINIC | Age: 48
End: 2018-07-31
Payer: COMMERCIAL

## 2018-07-31 VITALS
SYSTOLIC BLOOD PRESSURE: 124 MMHG | HEIGHT: 76 IN | WEIGHT: 315 LBS | HEART RATE: 72 BPM | OXYGEN SATURATION: 96 % | BODY MASS INDEX: 38.36 KG/M2 | DIASTOLIC BLOOD PRESSURE: 92 MMHG

## 2018-07-31 DIAGNOSIS — M54.5 CHRONIC LOW BACK PAIN, UNSPECIFIED BACK PAIN LATERALITY, WITH SCIATICA PRESENCE UNSPECIFIED: ICD-10-CM

## 2018-07-31 DIAGNOSIS — G89.29 CHRONIC LOW BACK PAIN, UNSPECIFIED BACK PAIN LATERALITY, WITH SCIATICA PRESENCE UNSPECIFIED: ICD-10-CM

## 2018-07-31 DIAGNOSIS — E78.5 HYPERLIPIDEMIA LDL GOAL <130: ICD-10-CM

## 2018-07-31 DIAGNOSIS — I10 ESSENTIAL HYPERTENSION: ICD-10-CM

## 2018-07-31 DIAGNOSIS — E66.01 MORBID OBESITY (H): ICD-10-CM

## 2018-07-31 DIAGNOSIS — F41.1 GAD (GENERALIZED ANXIETY DISORDER): Chronic | ICD-10-CM

## 2018-07-31 DIAGNOSIS — Z12.11 SPECIAL SCREENING FOR MALIGNANT NEOPLASMS, COLON: Primary | ICD-10-CM

## 2018-07-31 DIAGNOSIS — K21.9 GASTROESOPHAGEAL REFLUX DISEASE WITHOUT ESOPHAGITIS: ICD-10-CM

## 2018-07-31 LAB
ANION GAP SERPL CALCULATED.3IONS-SCNC: 6 MMOL/L (ref 3–14)
BUN SERPL-MCNC: 19 MG/DL (ref 7–30)
CALCIUM SERPL-MCNC: 8.6 MG/DL (ref 8.5–10.1)
CHLORIDE SERPL-SCNC: 104 MMOL/L (ref 94–109)
CHOLEST SERPL-MCNC: 252 MG/DL
CO2 SERPL-SCNC: 27 MMOL/L (ref 20–32)
CREAT SERPL-MCNC: 1.03 MG/DL (ref 0.66–1.25)
GFR SERPL CREATININE-BSD FRML MDRD: 77 ML/MIN/1.7M2
GLUCOSE SERPL-MCNC: 92 MG/DL (ref 70–99)
HDLC SERPL-MCNC: 38 MG/DL
LDLC SERPL CALC-MCNC: 180 MG/DL
NONHDLC SERPL-MCNC: 214 MG/DL
POTASSIUM SERPL-SCNC: 4.1 MMOL/L (ref 3.4–5.3)
SODIUM SERPL-SCNC: 137 MMOL/L (ref 133–144)
TRIGL SERPL-MCNC: 168 MG/DL

## 2018-07-31 PROCEDURE — 80061 LIPID PANEL: CPT | Performed by: INTERNAL MEDICINE

## 2018-07-31 PROCEDURE — 80048 BASIC METABOLIC PNL TOTAL CA: CPT | Performed by: INTERNAL MEDICINE

## 2018-07-31 PROCEDURE — 36415 COLL VENOUS BLD VENIPUNCTURE: CPT | Performed by: INTERNAL MEDICINE

## 2018-07-31 PROCEDURE — 99214 OFFICE O/P EST MOD 30 MIN: CPT | Performed by: INTERNAL MEDICINE

## 2018-07-31 RX ORDER — ESCITALOPRAM OXALATE 20 MG/1
20 TABLET ORAL DAILY
Qty: 90 TABLET | Refills: 3 | Status: SHIPPED | OUTPATIENT
Start: 2018-07-31 | End: 2019-06-26 | Stop reason: ALTCHOICE

## 2018-07-31 RX ORDER — HYDROXYZINE HYDROCHLORIDE 25 MG/1
TABLET, FILM COATED ORAL
Qty: 90 TABLET | Refills: 3 | Status: SHIPPED | OUTPATIENT
Start: 2018-07-31 | End: 2019-06-26

## 2018-07-31 RX ORDER — ROSUVASTATIN CALCIUM 10 MG/1
10 TABLET, COATED ORAL DAILY
Qty: 90 TABLET | Refills: 3 | Status: SHIPPED | OUTPATIENT
Start: 2018-07-31 | End: 2019-06-26

## 2018-07-31 RX ORDER — LOSARTAN POTASSIUM 50 MG/1
50 TABLET ORAL DAILY
Qty: 90 TABLET | Refills: 3 | Status: SHIPPED | OUTPATIENT
Start: 2018-07-31 | End: 2018-09-28

## 2018-07-31 RX ORDER — PANTOPRAZOLE SODIUM 40 MG/1
40 TABLET, DELAYED RELEASE ORAL DAILY
Qty: 90 TABLET | Refills: 3 | Status: SHIPPED | OUTPATIENT
Start: 2018-07-31 | End: 2019-06-26

## 2018-07-31 ASSESSMENT — ANXIETY QUESTIONNAIRES
5. BEING SO RESTLESS THAT IT IS HARD TO SIT STILL: NOT AT ALL
IF YOU CHECKED OFF ANY PROBLEMS ON THIS QUESTIONNAIRE, HOW DIFFICULT HAVE THESE PROBLEMS MADE IT FOR YOU TO DO YOUR WORK, TAKE CARE OF THINGS AT HOME, OR GET ALONG WITH OTHER PEOPLE: SOMEWHAT DIFFICULT
7. FEELING AFRAID AS IF SOMETHING AWFUL MIGHT HAPPEN: NOT AT ALL
1. FEELING NERVOUS, ANXIOUS, OR ON EDGE: SEVERAL DAYS
2. NOT BEING ABLE TO STOP OR CONTROL WORRYING: SEVERAL DAYS
GAD7 TOTAL SCORE: 4
3. WORRYING TOO MUCH ABOUT DIFFERENT THINGS: NOT AT ALL
6. BECOMING EASILY ANNOYED OR IRRITABLE: SEVERAL DAYS

## 2018-07-31 ASSESSMENT — PATIENT HEALTH QUESTIONNAIRE - PHQ9: 5. POOR APPETITE OR OVEREATING: SEVERAL DAYS

## 2018-07-31 NOTE — LETTER
July 31, 2018      Amarjit Malik  [address] [facility] [address]        Dear ,    We are writing to inform you of your test results.    Kidney function, blood sugar, electrolytes are normal.   Cholesterol is higher as expected, off the statin.   Continue with plan of care discussed during office visit     Resulted Orders   Basic metabolic panel   Result Value Ref Range    Sodium 137 133 - 144 mmol/L    Potassium 4.1 3.4 - 5.3 mmol/L    Chloride 104 94 - 109 mmol/L    Carbon Dioxide 27 20 - 32 mmol/L    Anion Gap 6 3 - 14 mmol/L    Glucose 92 70 - 99 mg/dL    Urea Nitrogen 19 7 - 30 mg/dL    Creatinine 1.03 0.66 - 1.25 mg/dL    GFR Estimate 77 >60 mL/min/1.7m2      Comment:      Non  GFR Calc    GFR Estimate If Black >90 >60 mL/min/1.7m2      Comment:       GFR Calc    Calcium 8.6 8.5 - 10.1 mg/dL   Lipid Profile (Chol, Trig, HDL, LDL calc)   Result Value Ref Range    Cholesterol 252 (H) <200 mg/dL      Comment:      Desirable:       <200 mg/dl    Triglycerides 168 (H) <150 mg/dL      Comment:      Borderline high:  150-199 mg/dl  High:             200-499 mg/dl  Very high:       >499 mg/dl      HDL Cholesterol 38 (L) >39 mg/dL    LDL Cholesterol Calculated 180 (H) <100 mg/dL      Comment:      Above desirable:  100-129 mg/dl  Borderline High:  130-159 mg/dL  High:             160-189 mg/dL  Very high:       >189 mg/dl      Non HDL Cholesterol 214 (H) <130 mg/dL      Comment:      Above Desirable:  130-159 mg/dl  Borderline high:  160-189 mg/dl  High:             190-219 mg/dl  Very high:       >219 mg/dl         If you have any questions or concerns, please call the clinic at the number listed above.       Sincerely,        Ashley Crawley DO

## 2018-07-31 NOTE — PROGRESS NOTES
SUBJECTIVE:   Amarjit Malik is a 48 year old male who presents to clinic today for the following health issues:      Hyperlipidemia Follow-Up      Rate your low fat/cholesterol diet?: good    Taking statin?  Yes, has been off for 3 months - ran out.    Other lipid medications/supplements?:  none    Hypertension Follow-up      Outpatient blood pressures are not being checked.    Low Salt Diet: no added salt    Anxiety Follow-Up- has seen Nolvia in the last month    Status since last visit: stable    Other associated symptoms:None    Complicating factors:   Significant life event: No   Current substance abuse: None  Depression symptoms: No  ERIC-7 SCORE 6/30/2017 2/27/2018 6/7/2018   Total Score - - -   Total Score 2 12 14       ERIC-7      Amount of exercise or physical activity: None    Problems taking medications regularly: No    Medication side effects: none    Diet: regular (no restrictions)    Hydroxyzine helps with anxiety and with sleep.    GERD:  Ran out of protonix and heartburn significantly worsened.  Was out for less than 1 week.  However, in the past, was not taking for 3-4 months and symptoms were severe.  Has allergy to Zantac.  Has not tried lower dose of protonix.  Has been on 40 mg since ~ 2005.    EGD 2012 - Normal examined duodenum.                            - Hiatus hernia.                            - Bilious gastric fluid.                            - Normal esophagus.  Recommendation:           - Continue present medications.                            - Patient was given an instructional booklet on                             gastroesophageal reflux disease                            - Follow an antireflux regimen indefinitely.          Current Outpatient Prescriptions   Medication Sig Dispense Refill     diclofenac (VOLTAREN) 50 MG EC tablet Take 1 tablet (50 mg) by mouth 2 times daily as needed for moderate pain 60 tablet 11     escitalopram (LEXAPRO) 20 MG tablet Take 1 tablet (20 mg)  "by mouth daily 90 tablet 1     hydrOXYzine (ATARAX) 25 MG tablet Take 25-50 mg up to three times daily as needed and  mg at night for sleep and anxiety. 60 tablet 6     losartan (COZAAR) 50 MG tablet Take 1 tablet (50 mg) by mouth daily 90 tablet 3     pantoprazole (PROTONIX) 40 MG EC tablet Take 1 tablet (40 mg) by mouth daily 30 tablet 0     rosuvastatin (CRESTOR) 10 MG tablet Take 1 tablet (10 mg) by mouth daily 90 tablet 3     Acetaminophen (TYLENOL PO) Take 1,000 mg by mouth 2 times daily       ORDER FOR DME Auto-CPAP:  Max 15 cm H2O  Min 8 cm H2O    Continuous    Lifetime need and heated humidity.    1 each 0       Reviewed and updated as needed this visit by clinical staff  Allergies  Meds  Problems       Reviewed and updated as needed this visit by Provider  Allergies  Meds  Problems         ROS:  Constitutional, HEENT, cardiovascular, pulmonary, gi and gu systems are negative, except as otherwise noted.    OBJECTIVE:     BP (!) 124/92 (BP Location: Right arm, Patient Position: Chair, Cuff Size: Adult Large)  Pulse 72  Ht 6' 4\" (1.93 m)  Wt 315 lb (142.9 kg)  SpO2 96%  BMI 38.34 kg/m2  Body mass index is 38.34 kg/(m^2).  GENERAL APPEARANCE: healthy, alert, no distress and over weight  RESP: lungs clear to auscultation - no rales, rhonchi or wheezes  CV: regular rates and rhythm, normal S1 S2, no S3 or S4 and no murmur, click or rub      ASSESSMENT/PLAN:       1. Chronic low back pain, unspecified back pain laterality, with sciatica presence unspecified - stable, refill given  Discussed risk of GIB with NSAID and anSSRI, risk is low due to concomitant use of PPI  - diclofenac (VOLTAREN) 50 MG EC tablet; Take 1 tablet (50 mg) by mouth 2 times daily as needed for moderate pain  Dispense: 60 tablet; Refill: 11    2. ERIC (generalized anxiety disorder) -  - stable, refill provided  - escitalopram (LEXAPRO) 20 MG tablet; Take 1 tablet (20 mg) by mouth daily  Dispense: 90 tablet; Refill: 3  - " hydrOXYzine (ATARAX) 25 MG tablet; Take 25-50 mg up to three times daily as needed and  mg at night for sleep and anxiety.  Dispense: 90 tablet; Refill: 3    3. Essential hypertension -  - stable, refill provided  - losartan (COZAAR) 50 MG tablet; Take 1 tablet (50 mg) by mouth daily  Dispense: 90 tablet; Refill: 3  - Basic metabolic panel    4. Hyperlipidemia LDL goal <130 -  - stable, refill provided  - losartan (COZAAR) 50 MG tablet; Take 1 tablet (50 mg) by mouth daily  Dispense: 90 tablet; Refill: 3  - rosuvastatin (CRESTOR) 10 MG tablet; Take 1 tablet (10 mg) by mouth daily  Dispense: 90 tablet; Refill: 3  - Lipid Profile (Chol, Trig, HDL, LDL calc)    5. Gastroesophageal reflux disease without esophagitis -  - stable, refill provided.  Discussed long term risk/benefits.  Benefits > risk, patient opts to continue  - pantoprazole (PROTONIX) 40 MG EC tablet; Take 1 tablet (40 mg) by mouth daily  Dispense: 90 tablet; Refill: 3    6. Special screening for malignant neoplasms, colon - check with insurance for coverage  - GASTROENTEROLOGY ADULT REF PROCEDURE ONLY [facility] [phone]    7. Morbid obesity (H) - with co-morbid hypertension and GERD        Ashley Crawley, [facility]

## 2018-07-31 NOTE — MR AVS SNAPSHOT
After Visit Summary   7/31/2018    Amarjit Malik    MRN: [id number]           Patient Information     Date Of Birth          1970        Visit Information        Provider Department      7/31/2018 6:40 AM Ashley Crawley, DO [facility]        Today's Diagnoses     Special screening for malignant neoplasms, colon    -  1    Chronic low back pain, unspecified back pain laterality, with sciatica presence unspecified        ERIC (generalized anxiety disorder)        Essential hypertension        Hyperlipidemia LDL goal <130        Gastroesophageal reflux disease without esophagitis        Morbid obesity (H)          Care Instructions    1. Check with insurance about colon cancer screening.  2. Labs today and med refills          Follow-ups after your visit        Additional Services     GASTROENTEROLOGY ADULT REF PROCEDURE ONLY [facility] [phone]                 Who to contact     If you have questions or need follow up information about today's clinic visit or your schedule please contact [facility] directly at [phone].  Normal or non-critical lab and imaging results will be communicated to you by MyChart, letter or phone within 4 business days after the clinic has received the results. If you do not hear from us within 7 days, please contact the clinic through [employer] or phone. If you have a critical or abnormal lab result, we will notify you by phone as soon as possible.  Submit refill requests through [employer] or call your pharmacy and they will forward the refill request to us. Please allow 3 business days for your refill to be completed.          Additional Information About Your Visit        MyChart Information     [employer] gives you secure access to your electronic health record. If you see a primary care provider, you can also send messages to your care team and make appointments. If you have questions, please call your primary care clinic.  If  "you do not have a primary care provider, please call [phone] and they will assist you.        Care EveryWhere ID     This is your Care EveryWhere ID. This could be used by other organizations to access your [place] medical records  [id number]        Your Vitals Were     Pulse Height Pulse Oximetry BMI (Body Mass Index)          72 6' 4\" (1.93 m) 96% 38.34 kg/m2         Blood Pressure from Last 3 Encounters:   07/31/18 (!) 124/92   06/07/18 134/86   02/27/18 115/85    Weight from Last 3 Encounters:   07/31/18 315 lb (142.9 kg)   06/07/18 315 lb (142.9 kg)   02/27/18 (!) 314 lb (142.4 kg)              We Performed the Following     Basic metabolic panel     GASTROENTEROLOGY ADULT REF PROCEDURE ONLY [facility] [phone]     Lipid Profile (Chol, Trig, HDL, LDL calc)          Where to get your medicines      These medications were sent to [facility] [place], [place] - [address]  [address] [facility] [address]     Phone:  [phone]     diclofenac 50 MG EC tablet    escitalopram 20 MG tablet    hydrOXYzine 25 MG tablet    losartan 50 MG tablet    pantoprazole 40 MG EC tablet    rosuvastatin 10 MG tablet          Primary Care Provider Office Phone # Fax #    Ashley Crawley [phone] [address] [facility] [address]        Equal Access to Services     ASTER BARNEY AH: Hadii aad ku hadasho Soomaali, waaxda luqadaha, qaybta kaalmada carter, he estevez. So [facility] [phone].    ATENCIÓN: Si habla español, tiene a fontanez disposición servicios gratuitos de asistencia lingüística. Kristyn yanes [phone].    We comply with applicable federal civil rights laws and [place] laws. We do not discriminate on the basis of race, color, national origin, age, disability, sex, sexual orientation, or gender identity.            Thank you!     Thank you for choosing [facility]  for your care. Our goal is always to " provide you with excellent care. Hearing back from our patients is one way we can continue to improve our services. Please take a few minutes to complete the written survey that you may receive in the mail after your visit with us. Thank you!             Your Updated Medication List - Protect others around you: Learn how to safely use, store and throw away your medicines at www.disposemymeds.org.          This list is accurate as of 7/31/18  7:11 AM.  Always use your most recent med list.                   Brand Name Dispense Instructions for use Diagnosis    diclofenac 50 MG EC tablet    VOLTAREN    60 tablet    Take 1 tablet (50 mg) by mouth 2 times daily as needed for moderate pain    Chronic low back pain, unspecified back pain laterality, with sciatica presence unspecified       escitalopram 20 MG tablet    LEXAPRO    90 tablet    Take 1 tablet (20 mg) by mouth daily    ERIC (generalized anxiety disorder)       hydrOXYzine 25 MG tablet    ATARAX    90 tablet    Take 25-50 mg up to three times daily as needed and  mg at night for sleep and anxiety.    ERIC (generalized anxiety disorder)       losartan 50 MG tablet    COZAAR    90 tablet    Take 1 tablet (50 mg) by mouth daily    Essential hypertension, Hyperlipidemia LDL goal <130       order for DME     1 each    Auto-CPAP: Max 15 cm H2O Min 8 cm H2O  Continuous  Lifetime need and heated humidity.    Hypersomnia with sleep apnea, unspecified       pantoprazole 40 MG EC tablet    PROTONIX    90 tablet    Take 1 tablet (40 mg) by mouth daily    Gastroesophageal reflux disease without esophagitis       rosuvastatin 10 MG tablet    CRESTOR    90 tablet    Take 1 tablet (10 mg) by mouth daily    Hyperlipidemia LDL goal <130       TYLENOL PO      Take 1,000 mg by mouth 2 times daily

## 2018-08-01 ASSESSMENT — ANXIETY QUESTIONNAIRES: GAD7 TOTAL SCORE: 4

## 2018-08-01 ASSESSMENT — PATIENT HEALTH QUESTIONNAIRE - PHQ9: SUM OF ALL RESPONSES TO PHQ QUESTIONS 1-9: 3

## 2018-09-05 ENCOUNTER — TELEPHONE (OUTPATIENT)
Dept: FAMILY MEDICINE | Facility: CLINIC | Age: 48
End: 2018-09-05

## 2018-09-05 NOTE — LETTER
Amarjit Malik  [address] [address]          09/05/18      Dear Amarjit Malik        Your most recent blood pressure reading preformed at our clinic was higher than we like to see it. The goal is to have it under 140/90 in clinic.    Please call our clinic [phone] to schedule a blood pressure recheck with our RN staff. This appointment is free of charges and it takes about 15 minutes to be complete.     Be sure to take all of your blood pressure medications, and avoid stimulants like caffeine, cold medicines, sudafed, or tobacco prior to your recheck.    If your blood pressure medication was changed by your provider recently wait 2 weeks before making this recheck appointment.     Thank you for trusting us with your health care.    Sincerely,    Your [facility] Care Team

## 2018-09-05 NOTE — TELEPHONE ENCOUNTER
Panel Management Review      Patient has the following on his problem list:     Hypertension   Last three blood pressure readings:  BP Readings from Last 3 Encounters:   07/31/18 (!) 124/92   06/07/18 134/86   02/27/18 115/85     Blood pressure: FAILED    HTN Guidelines:  Age 18-59 BP range:  Less than 140/90  Age 60-85 with Diabetes:  Less than 140/90  Age 60-85 without Diabetes:  less than 150/90      Composite cancer screening  Chart review shows that this patient is due/due soon for the following None  Summary:    Patient is due/failing the following:   BP CHECK    Action needed:   Patient needs nurse only appointment.    Type of outreach:    Sent letter.    Questions for provider review:    None                                                                                                                                         Chart routed to Care Team .

## 2018-09-12 NOTE — TELEPHONE ENCOUNTER
Spoke with the pt today and explain about last BP and the provider plans for recheck with RN (Free Visit).  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  Staff verbalize that this is not usually done, but will sent a msg for the PCP.  Route to the PCP.  Ladi Alvarado CMA (MARIO)   (aka: Suly Alvarado)

## 2018-09-27 ENCOUNTER — MYC MEDICAL ADVICE (OUTPATIENT)
Dept: FAMILY MEDICINE | Facility: CLINIC | Age: 48
End: 2018-09-27

## 2018-09-27 DIAGNOSIS — E78.5 HYPERLIPIDEMIA LDL GOAL <130: ICD-10-CM

## 2018-09-27 DIAGNOSIS — I10 ESSENTIAL HYPERTENSION: ICD-10-CM

## 2018-09-28 RX ORDER — LOSARTAN POTASSIUM 100 MG/1
100 TABLET ORAL DAILY
Qty: 90 TABLET | Refills: 3 | Status: SHIPPED | OUTPATIENT
Start: 2018-09-28 | End: 2019-10-11

## 2018-12-18 DIAGNOSIS — G47.33 OBSTRUCTIVE SLEEP APNEA: Primary | ICD-10-CM

## 2018-12-27 ENCOUNTER — TELEPHONE (OUTPATIENT)
Dept: FAMILY MEDICINE | Facility: CLINIC | Age: 48
End: 2018-12-27

## 2018-12-27 NOTE — TELEPHONE ENCOUNTER
Panel Management:    Patient's blood pressure was elevated at last clinic visit with Dr. Crawley.  It is important to get blood pressure < 140/80, especially for those with a history of diabetes or vascular disease.      Please contact the patient and have them schedule RN visit for free blood pressure check.

## 2018-12-27 NOTE — LETTER
January 7, 2019      Amarjit Malik  [address] [address]        Dear Amarjit,         Your most recent blood pressure reading preformed at our clinic was higher than we like to see it. The goal is to have it under 140/90 in clinic. Please call our clinic [phone] to schedule a blood pressure recheck on our RN schedule. This appointment is free of charge and takes about 15 minutes to complete. Be sure to take all of your blood pressure medications, and avoid stimulants like caffeine, cold medicines, sudafed, or tobacco prior to your recheck.    If your blood pressure medication was changed by your provider recently wait 2 weeks before making this recheck appointment.         Sincerely,        Ashley Crawley DO/cb

## 2018-12-27 NOTE — LETTER
January 2, 2019      Amarjit Malik  [address] [address]        Dear Amarjit,     Dear Amarjit,    Your most recent blood pressure reading preformed at our clinic was higher than we like to see it. The goal is to have it under 140/90 in clinic. Please call our clinic [phone] to schedule a blood pressure recheck on our RN schedule. This appointment is free of charge and takes about 15 minutes to complete. Be sure to take all of your blood pressure medications, and avoid stimulants like caffeine, cold medicines, sudafed, or tobacco prior to your recheck.    If your blood pressure medication was changed by your provider recently wait 2 weeks before making this recheck appointment.     Thank you    Sincerely,        Ashley Crawley, /cb

## 2018-12-28 NOTE — TELEPHONE ENCOUNTER
Panel Management Review      Patient has the following on his problem list:   Patient Active Problem List   Diagnosis     Hypersomnia with sleep apnea     Benign essential hypertension     Gastroesophageal reflux disease without esophagitis     ERIC (generalized anxiety disorder)     HYPERLIPIDEMIA LDL GOAL <130     Chest pain     Bone marrow donor     Obstructive sleep apnea     Obesity (BMI 30-39.9)     Donor of stem cell     Mixed hyperlipidemia     Morbid obesity (H)         Composite cancer screening  Chart review shows that this patient is due/due soon for the following   Health Maintenance   Topic Date Due     INFLUENZA VACCINE (1) 09/01/2018     BMP Q1 YR  07/31/2019     LIPID MONITORING Q1 YEAR  07/31/2019     PHQ-2 Q1 YR  07/31/2019     ZOSTER IMMUNIZATION (1 of 2) 07/20/2020     DTAP/TDAP/TD IMMUNIZATION (2 - Td) 09/23/2023     HIV SCREEN (SYSTEM ASSIGNED)  Completed     IPV IMMUNIZATION  Aged Out     MENINGITIS IMMUNIZATION  Aged Out     Summary:    Patient is due/failing the following:   Health Maintenance   Topic Date Due     INFLUENZA VACCINE (1) 09/01/2018     BMP Q1 YR  07/31/2019     LIPID MONITORING Q1 YEAR  07/31/2019     PHQ-2 Q1 YR  07/31/2019     ZOSTER IMMUNIZATION (1 of 2) 07/20/2020     DTAP/TDAP/TD IMMUNIZATION (2 - Td) 09/23/2023     HIV SCREEN (SYSTEM ASSIGNED)  Completed     IPV IMMUNIZATION  Aged Out     MENINGITIS IMMUNIZATION  Aged Out       Action needed:   Patient needs office visit for nurse only blood pressure recheck.  BP Readings from Last 6 Encounters:   07/31/18 (!) 124/92   06/07/18 134/86   02/27/18 115/85   08/30/17 (!) 134/92   07/07/17 123/84   06/30/17 (!) 148/96     Type of outreach:    Phone, left message for patient to call back.     Questions for provider review:    None                                                                                                                                    Dimple Hernandez MA    Chart routed to none .

## 2019-01-01 ENCOUNTER — MYC MEDICAL ADVICE (OUTPATIENT)
Dept: FAMILY MEDICINE | Facility: CLINIC | Age: 49
End: 2019-01-01

## 2019-01-02 NOTE — TELEPHONE ENCOUNTER
Panel Management Review      Patient has the following on his problem list:     Hypertension   Last three blood pressure readings:  BP Readings from Last 3 Encounters:   07/31/18 (!) 124/92   06/07/18 134/86   02/27/18 115/85     Blood pressure: FAILED    HTN Guidelines:  Age 18-59 BP range:  Less than 140/90  Age 60-85 with Diabetes:  Less than 140/90  Age 60-85 without Diabetes:  less than 150/90      Composite cancer screening  Chart review shows that this patient is due/due soon for the following None  Summary:    Patient is due/failing the following:   BP CHECK    Action needed:   Patient needs nurse only appointment.    Type of outreach:    Sent [employer] message. will postpone a few days to see if he views.    Shaji NEWMAN CMA

## 2019-01-07 NOTE — TELEPHONE ENCOUNTER
Panel Management Review      Patient has the following on his problem list:     Hypertension   Last three blood pressure readings:  BP Readings from Last 3 Encounters:   07/31/18 (!) 124/92   06/07/18 134/86   02/27/18 115/85     Blood pressure: FAILED    HTN Guidelines:  Age 18-59 BP range:  Less than 140/90  Age 60-85 with Diabetes:  Less than 140/90  Age 60-85 without Diabetes:  less than 150/90      Composite cancer screening  Chart review shows that this patient is due/due soon for the following None  Summary:    Patient is due/failing the following:   BP CHECK    Action needed:   Patient needs nurse only appointment.    Type of outreach:    Sent letter.    Questions for provider review:    None                                                                                                                                  Shaji NEWMAN CMA

## 2019-02-06 ENCOUNTER — TELEPHONE (OUTPATIENT)
Dept: FAMILY MEDICINE | Facility: CLINIC | Age: 49
End: 2019-02-06

## 2019-02-06 NOTE — TELEPHONE ENCOUNTER
Panel Management Review      Patient has the following on his problem list:     Hypertension   Last three blood pressure readings:  BP Readings from Last 3 Encounters:   07/31/18 (!) 124/92   06/07/18 134/86   02/27/18 115/85     Blood pressure: FAILED    HTN Guidelines:  Age 18-59 BP range:  Less than 140/90  Age 60-85 with Diabetes:  Less than 140/90  Age 60-85 without Diabetes:  less than 150/90      Composite cancer screening  Chart review shows that this patient is due/due soon for the following None  Summary:    Patient is due/failing the following:   BP CHECK    Action needed:   Patient needs nurse only appointment.    Type of outreach:    Sent letter.    Questions for provider review:    None                                                                                                                                    Ladi Alvarado CMA (AAMA)   (aka: Suly Alvarado)       Chart routed to Care Team .

## 2019-02-06 NOTE — LETTER
Amarjit Malik  [address] [facility] [address]          02/06/19      Dear Amarjit Malik        Your most recent blood pressure reading preformed at our clinic was higher than we like to see it. The goal is to have it under 140/90 in clinic.    Please call our clinic [phone] to schedule a blood pressure recheck with our RN staff. This appointment is free of charges and it takes about 15 minutes to be complete.     Be sure to take all of your blood pressure medications, and avoid stimulants like caffeine, cold medicines, sudafed, or tobacco prior to your recheck.    If your blood pressure medication was changed by your provider recently wait 2 weeks before making this recheck appointment.     Thank you for trusting us with your health care.    Sincerely,    Your [facility] Care Team

## 2019-02-07 ENCOUNTER — OFFICE VISIT (OUTPATIENT)
Dept: PODIATRY | Facility: CLINIC | Age: 49
End: 2019-02-07
Payer: COMMERCIAL

## 2019-02-07 VITALS
HEART RATE: 96 BPM | WEIGHT: 315 LBS | SYSTOLIC BLOOD PRESSURE: 141 MMHG | BODY MASS INDEX: 38.36 KG/M2 | HEIGHT: 76 IN | DIASTOLIC BLOOD PRESSURE: 94 MMHG

## 2019-02-07 DIAGNOSIS — B35.1 ONYCHOMYCOSIS OF GREAT TOE: ICD-10-CM

## 2019-02-07 DIAGNOSIS — L60.0 INGROWN TOENAIL OF RIGHT FOOT: ICD-10-CM

## 2019-02-07 DIAGNOSIS — L60.0 INGROWN TOENAIL OF LEFT FOOT: Primary | ICD-10-CM

## 2019-02-07 PROCEDURE — 11750 EXCISION NAIL&NAIL MATRIX: CPT | Mod: TA | Performed by: PODIATRIST

## 2019-02-07 PROCEDURE — 99203 OFFICE O/P NEW LOW 30 MIN: CPT | Mod: 25 | Performed by: PODIATRIST

## 2019-02-07 PROCEDURE — 11730 AVULSION NAIL PLATE SIMPLE 1: CPT | Mod: 59 | Performed by: PODIATRIST

## 2019-02-07 RX ORDER — CICLOPIROX 80 MG/ML
SOLUTION TOPICAL DAILY
Qty: 6 ML | Refills: 1 | Status: SHIPPED | OUTPATIENT
Start: 2019-02-07 | End: 2019-06-26

## 2019-02-07 ASSESSMENT — MIFFLIN-ST. JEOR: SCORE: 2400.33

## 2019-02-07 NOTE — NURSING NOTE
"Chief Complaint   Patient presents with     Ingrown Toenail     Great left toe, Rt foot great toe and 2cd toe fungus       Initial BP (!) 141/94 (BP Location: Right arm, Patient Position: Sitting, Cuff Size: Adult Large)   Pulse 96   Ht 1.93 m (6' 4\")   Wt 142.9 kg (315 lb)   BMI 38.34 kg/m   Estimated body mass index is 38.34 kg/m  as calculated from the following:    Height as of this encounter: 1.93 m (6' 4\").    Weight as of this encounter: 142.9 kg (315 lb).  Medications and allergies reviewed.      Lissette HERNANDEZ MA    "

## 2019-02-07 NOTE — PROGRESS NOTES
Amarjit Malik is a 48 year old male who presents with a chief complain of a painful ingrown toenail to the left great toe.  The patient relates the ingrown nail was first noticed several weeks ago and has steadily become worse.  The patient relates the medial nail border is inflamed and sore to the touch.  The patient relates no bloody drainage.  The patient denies any redness extending up the big toe.  The patient has been treating the big toe by soaking it in salt water and antibiotics with no relief.   The patient also relates having a damaged nail on the second toe of the right foot discoloration of both the second toe and the right great toe.       REVIEW OF SYSTEMS:  Constitutional, HEENT, cardiovascular, pulmonary, GI, , musculoskeletal, neuro, skin, endocrine and psych systems are negative, except as otherwise noted.     PAST MEDICAL HISTORY:   Past Medical History:   Diagnosis Date     Anxiety      Gastro-oesophageal reflux disease      Obesity (BMI 30-39.9)      Obstructive sleep apnea (adult) (pediatric)      panic attacks, and anxiety  1/5/2006    stable on paxil     PONV (postoperative nausea and vomiting)      Pure hypercholesterolemia      Unspecified essential hypertension         PAST SURGICAL HISTORY:   Past Surgical History:   Procedure Laterality Date     ESOPHAGOSCOPY, GASTROSCOPY, DUODENOSCOPY (EGD), COMBINED  12/17/2012    Procedure: COMBINED ESOPHAGOSCOPY, GASTROSCOPY, DUODENOSCOPY (EGD);  Gastroscopy  ;  Surgeon: Luc Smith MD;  Location: [facility]     HERNIA REPAIR       PROCURE BONE MARROW  1/3/2014    Procedure: PROCURE BONE MARROW;  Bone Marrow [facility];  Surgeon: Chaz Negro MD;  Location: [facility]     SURGICAL HISTORY OF -       excision of bone tumor     SURGICAL HISTORY OF -   12/00    vasectomy        MEDICATIONS:   Current Outpatient Medications:      Acetaminophen (TYLENOL PO), Take 1,000 mg by mouth 2 times daily, Disp: , Rfl:      diclofenac (VOLTAREN) 50  MG EC tablet, Take 1 tablet (50 mg) by mouth 2 times daily as needed for moderate pain, Disp: 60 tablet, Rfl: 11     escitalopram (LEXAPRO) 20 MG tablet, Take 1 tablet (20 mg) by mouth daily, Disp: 90 tablet, Rfl: 3     hydrOXYzine (ATARAX) 25 MG tablet, Take 25-50 mg up to three times daily as needed and  mg at night for sleep and anxiety., Disp: 90 tablet, Rfl: 3     losartan (COZAAR) 100 MG tablet, Take 1 tablet (100 mg) by mouth daily, Disp: 90 tablet, Rfl: 3     ORDER FOR DME, Auto-CPAP: Max 15 cm H2O Min 8 cm H2O  Continuous  Lifetime need and heated humidity.  , Disp: 1 each, Rfl: 0     pantoprazole (PROTONIX) 40 MG EC tablet, Take 1 tablet (40 mg) by mouth daily, Disp: 90 tablet, Rfl: 3     rosuvastatin (CRESTOR) 10 MG tablet, Take 1 tablet (10 mg) by mouth daily, Disp: 90 tablet, Rfl: 3     ALLERGIES:    Allergies   Allergen Reactions     Zantac      flushing        SOCIAL HISTORY:   Social History     Socioeconomic History     Marital status:      Spouse name: Not on file     Number of children: Not on file     Years of education: Not on file     Highest education level: Not on file   Social Needs     Financial resource strain: Not on file     Food insecurity - worry: Not on file     Food insecurity - inability: Not on file     Transportation needs - medical: Not on file     Transportation needs - non-medical: Not on file   Occupational History     Not on file   Tobacco Use     Smoking status: Never Smoker     Smokeless tobacco: Never Used   Substance and Sexual Activity     Alcohol use: No     Comment: rare     Drug use: No     Sexual activity: Yes     Partners: Female   Other Topics Concern     Parent/sibling w/ CABG, MI or angioplasty before 65F 55M? No   Social History Narrative     Not on file        FAMILY HISTORY:   Family History   Problem Relation Age of Onset     Diabetes Mother         type 2     Hypertension Father      Diabetes Brother      Heart Disease Paternal Uncle         MI  "at 49 yo     C.A.D. Other         paternal uncle  at 45 of MI     Cancer - colorectal No family hx of      Prostate Cancer No family hx of         EXAM:Vitals: BP (!) 141/94 (BP Location: Right arm, Patient Position: Sitting, Cuff Size: Adult Large)   Pulse 96   Ht 1.93 m (6' 4\")   Wt 142.9 kg (315 lb)   BMI 38.34 kg/m    BMI= Body mass index is 38.34 kg/m .  Weight management plan: Patient was referred to their PCP to discuss a diet and exercise plan.    General appearance: Patient is alert and fully cooperative with history & exam.  No sign of distress is noted during the visit.     Psychiatric: Affect is pleasant & appropriate.  Patient appears motivated to improve health.     Respiratory: Breathing is regular & unlabored while sitting.     HEENT: Hearing is intact to spoken word.  Speech is clear.  No gross evidence of visual impairment that would impact ambulation.     Dermatologic: Skin is intact to both lower extremities without significant lesions, rash or abrasion.  Noted paronychia.     Vascular: DP & PT pulses are intact & regular bilaterally.  No significant edema or varicosities noted.  CFT and skin temperature is normal to both lower extremities.     Neurologic: Lower extremity sensation is intact to light touch.  No evidence of weakness or contracture in the lower extremities.  No evidence of neuropathy.     Musculoskeletal: Patient is ambulatory without assistive device or brace.  No gross ankle deformity noted.  No foot or ankle joint effusion is noted.    One notes an inflamed medial nail border of the left great toe.  There is noted erythema and edema located around the medial labial fold and does not extend past the interphalangeal joint.  There is no apparent purulent drainage noted.  There is pain on palpation to the proximal aspect of the medial nail border.      Assessment:  1.  Paranychia to the medial aspect of the left great toe.      Plan:  I have explained to Amarjit about the " condition.  The potential causes and nature of an ingrown toenail were discussed with the patient.  We reviewed the natural history/prognosis of the condition and potential risks if no treatment is provided.      Treatment options discussed included conservative management (oral antibiotics, soaking of foot, adequate width shoes)  as well as surgical management (partial or total nail removal).  The pros and cons of both forms of treatment were reviewed.      After thorough discussion and answering all questions, the patient elected to proceed with surgery.    At this point, I recommended having the offending nail border permanently removed.  I have explained to the patient all of the possible risks, benefits, alternatives to the procedure.  The patient consented to the proposed procedure.  The left hallux was swabbed with alcohol.  Next, approximately 5 cc of 1% lidocaine plain was injected around the left hallux.  The left hallux was then prepped with Betadine ointment.  A tourniquet was applied to the left hallux.  The offending nail border was split using an English anvil back to the matrix.  Next, utilizing a straight hemostat the offending nail border was avulsed with the attached matrix.  The wound bed was debrided on any remaining nail, matrix and skin.  Next, the offending nail border was treated with 89% phenol using microtip cotton applicators for 30 seconds.  The wound was then irrigated and dressed with Triple antibiotic ointment and a compressive dry sterile dressing.  The tourniquet was removed and a prompt hyperemic response was noted.      Attention was directed to the second toe on the right foot where a similar procedure was performed with exception of utilizing phenol.  After removing the nail plate of the second toe on the right foot the underlying nail bed appeared normal with no evidence of skin lesions.    The patient tolerated the procedures well with no complications.  The patient was given  post procedure instructions for the care of the wound.    The patient was prescribed Penlac to be applied to the affected toenails on the right foot as directed for up to 6-9 months.    Disclaimer: This note consists of symbols derived from keyboarding, dictation and/or voice recognition software. As a result, there may be errors in the script that have gone undetected. Please consider this when interpreting information found in this chart.      GIA Araya D.P.M., [email or web address].

## 2019-02-07 NOTE — LETTER
2/7/2019         RE: Amarjit Malik  [address] [facility] [id number]        Dear Colleague,    Thank you for referring your patient, Amarjit Malik, to the [facility]. Please see a copy of my visit note below.    Amarjit Malik is a 48 year old male who presents with a chief complain of a painful ingrown toenail to the left great toe.  The patient relates the ingrown nail was first noticed several weeks ago and has steadily become worse.  The patient relates the medial nail border is inflamed and sore to the touch.  The patient relates no bloody drainage.  The patient denies any redness extending up the big toe.  The patient has been treating the big toe by soaking it in salt water and antibiotics with no relief.   The patient also relates having a damaged nail on the second toe of the right foot discoloration of both the second toe and the right great toe.       REVIEW OF SYSTEMS:  Constitutional, HEENT, cardiovascular, pulmonary, GI, , musculoskeletal, neuro, skin, endocrine and psych systems are negative, except as otherwise noted.     PAST MEDICAL HISTORY:   Past Medical History:   Diagnosis Date     Anxiety      Gastro-oesophageal reflux disease      Obesity (BMI 30-39.9)      Obstructive sleep apnea (adult) (pediatric)      panic attacks, and anxiety  1/5/2006    stable on paxil     PONV (postoperative nausea and vomiting)      Pure hypercholesterolemia      Unspecified essential hypertension         PAST SURGICAL HISTORY:   Past Surgical History:   Procedure Laterality Date     ESOPHAGOSCOPY, GASTROSCOPY, DUODENOSCOPY (EGD), COMBINED  12/17/2012    Procedure: COMBINED ESOPHAGOSCOPY, GASTROSCOPY, DUODENOSCOPY (EGD);  Gastroscopy  ;  Surgeon: Luc Smith MD;  Location: [facility]     HERNIA REPAIR       PROCURE BONE MARROW  1/3/2014    Procedure: PROCURE BONE MARROW;  Bone Marrow [facility];  Surgeon: Chaz Negro MD;  Location:  OR      SURGICAL HISTORY OF -       excision of bone tumor     SURGICAL HISTORY OF -   12/00    vasectomy        MEDICATIONS:   Current Outpatient Medications:      Acetaminophen (TYLENOL PO), Take 1,000 mg by mouth 2 times daily, Disp: , Rfl:      diclofenac (VOLTAREN) 50 MG EC tablet, Take 1 tablet (50 mg) by mouth 2 times daily as needed for moderate pain, Disp: 60 tablet, Rfl: 11     escitalopram (LEXAPRO) 20 MG tablet, Take 1 tablet (20 mg) by mouth daily, Disp: 90 tablet, Rfl: 3     hydrOXYzine (ATARAX) 25 MG tablet, Take 25-50 mg up to three times daily as needed and  mg at night for sleep and anxiety., Disp: 90 tablet, Rfl: 3     losartan (COZAAR) 100 MG tablet, Take 1 tablet (100 mg) by mouth daily, Disp: 90 tablet, Rfl: 3     ORDER FOR DME, Auto-CPAP: Max 15 cm H2O Min 8 cm H2O  Continuous  Lifetime need and heated humidity.  , Disp: 1 each, Rfl: 0     pantoprazole (PROTONIX) 40 MG EC tablet, Take 1 tablet (40 mg) by mouth daily, Disp: 90 tablet, Rfl: 3     rosuvastatin (CRESTOR) 10 MG tablet, Take 1 tablet (10 mg) by mouth daily, Disp: 90 tablet, Rfl: 3     ALLERGIES:    Allergies   Allergen Reactions     Zantac      flushing        SOCIAL HISTORY:   Social History     Socioeconomic History     Marital status:      Spouse name: Not on file     Number of children: Not on file     Years of education: Not on file     Highest education level: Not on file   Social Needs     Financial resource strain: Not on file     Food insecurity - worry: Not on file     Food insecurity - inability: Not on file     Transportation needs - medical: Not on file     Transportation needs - non-medical: Not on file   Occupational History     Not on file   Tobacco Use     Smoking status: Never Smoker     Smokeless tobacco: Never Used   Substance and Sexual Activity     Alcohol use: No     Comment: rare     Drug use: No     Sexual activity: Yes     Partners: Female   Other Topics Concern     Parent/sibling w/ CABG, MI or  "angioplasty before 65F 55M? No   Social History Narrative     Not on file        FAMILY HISTORY:   Family History   Problem Relation Age of Onset     Diabetes Mother         type 2     Hypertension Father      Diabetes Brother      Heart Disease Paternal Uncle         MI at 49 yo     C.A.D. Other         paternal uncle  at 45 of MI     Cancer - colorectal No family hx of      Prostate Cancer No family hx of         EXAM:Vitals: BP (!) 141/94 (BP Location: Right arm, Patient Position: Sitting, Cuff Size: Adult Large)   Pulse 96   Ht 1.93 m (6' 4\")   Wt 142.9 kg (315 lb)   BMI 38.34 kg/m     BMI= Body mass index is 38.34 kg/m .  Weight management plan: Patient was referred to their PCP to discuss a diet and exercise plan.    General appearance: Patient is alert and fully cooperative with history & exam.  No sign of distress is noted during the visit.     Psychiatric: Affect is pleasant & appropriate.  Patient appears motivated to improve health.     Respiratory: Breathing is regular & unlabored while sitting.     HEENT: Hearing is intact to spoken word.  Speech is clear.  No gross evidence of visual impairment that would impact ambulation.     Dermatologic: Skin is intact to both lower extremities without significant lesions, rash or abrasion.  Noted paronychia.     Vascular: DP & PT pulses are intact & regular bilaterally.  No significant edema or varicosities noted.  CFT and skin temperature is normal to both lower extremities.     Neurologic: Lower extremity sensation is intact to light touch.  No evidence of weakness or contracture in the lower extremities.  No evidence of neuropathy.     Musculoskeletal: Patient is ambulatory without assistive device or brace.  No gross ankle deformity noted.  No foot or ankle joint effusion is noted.    One notes an inflamed medial nail border of the left great toe.  There is noted erythema and edema located around the medial labial fold and does not extend past the " interphalangeal joint.  There is no apparent purulent drainage noted.  There is pain on palpation to the proximal aspect of the medial nail border.      Assessment:  1.  Paranychia to the medial aspect of the left great toe.      Plan:  I have explained to Amarjit about the condition.  The potential causes and nature of an ingrown toenail were discussed with the patient.  We reviewed the natural history/prognosis of the condition and potential risks if no treatment is provided.      Treatment options discussed included conservative management (oral antibiotics, soaking of foot, adequate width shoes)  as well as surgical management (partial or total nail removal).  The pros and cons of both forms of treatment were reviewed.      After thorough discussion and answering all questions, the patient elected to proceed with surgery.    At this point, I recommended having the offending nail border permanently removed.  I have explained to the patient all of the possible risks, benefits, alternatives to the procedure.  The patient consented to the proposed procedure.  The left hallux was swabbed with alcohol.  Next, approximately 5 cc of 1% lidocaine plain was injected around the left hallux.  The left hallux was then prepped with Betadine ointment.  A tourniquet was applied to the left hallux.  The offending nail border was split using an English anvil back to the matrix.  Next, utilizing a straight hemostat the offending nail border was avulsed with the attached matrix.  The wound bed was debrided on any remaining nail, matrix and skin.  Next, the offending nail border was treated with 89% phenol using microtip cotton applicators for 30 seconds.  The wound was then irrigated and dressed with Triple antibiotic ointment and a compressive dry sterile dressing.  The tourniquet was removed and a prompt hyperemic response was noted.      Attention was directed to the second toe on the right foot where a similar procedure was  performed with exception of utilizing phenol.  After removing the nail plate of the second toe on the right foot the underlying nail bed appeared normal with no evidence of skin lesions.    The patient tolerated the procedures well with no complications.  The patient was given post procedure instructions for the care of the wound.    The patient was prescribed Penlac to be applied to the affected toenails on the right foot as directed for up to 6-9 months.    Disclaimer: This note consists of symbols derived from keyboarding, dictation and/or voice recognition software. As a result, there may be errors in the script that have gone undetected. Please consider this when interpreting information found in this chart.      GIA Araya D.P.M., F.A.C.F.A.S.        Again, thank you for allowing me to participate in the care of your patient.        Sincerely,        Leandro Araya DPM

## 2019-02-21 NOTE — TELEPHONE ENCOUNTER
(1st attempt) Left a voice msg for pt to call back.  When he does schedule BP recheck with JACINDA.  Ladi Alvarado CMA ([facility])   (aka: Suly Alvarado)

## 2019-03-07 NOTE — TELEPHONE ENCOUNTER
(2nd attempt) Left a voice msg for pt to call back.  When he does schedule BP recheck with RN.  Ladi Alvarado CMA ([facility])   (aka: Suly Alvarado)

## 2019-04-29 ENCOUNTER — MYC MEDICAL ADVICE (OUTPATIENT)
Dept: FAMILY MEDICINE | Facility: CLINIC | Age: 49
End: 2019-04-29

## 2019-04-29 DIAGNOSIS — F41.1 GAD (GENERALIZED ANXIETY DISORDER): Primary | ICD-10-CM

## 2019-06-20 ENCOUNTER — TELEPHONE (OUTPATIENT)
Dept: INTERNAL MEDICINE | Facility: CLINIC | Age: 49
End: 2019-06-20

## 2019-06-20 NOTE — TELEPHONE ENCOUNTER
Panel Management Review      Patient has the following on his problem list:     Hypertension   Last three blood pressure readings:  BP Readings from Last 3 Encounters:   02/07/19 (!) 141/94   07/31/18 (!) 124/92   06/07/18 134/86     Blood pressure: FAILED    HTN Guidelines:  Less than 140/90      Composite cancer screening  Chart review shows that this patient is due/due soon for the following None  Summary:    Patient is due/failing the following:   BP CHECK    Action needed:   Patient needs nurse only appointment.    Type of outreach:    Sent [employer] message.    Questions for provider review:    None                                                                                                                                    Ladi Alvarado CMA ([facility])   (aka: Suly Alvarado)       Chart routed to Care Team .

## 2019-06-26 ENCOUNTER — OFFICE VISIT (OUTPATIENT)
Dept: FAMILY MEDICINE | Facility: CLINIC | Age: 49
End: 2019-06-26
Payer: COMMERCIAL

## 2019-06-26 VITALS
TEMPERATURE: 97.6 F | OXYGEN SATURATION: 94 % | HEART RATE: 73 BPM | RESPIRATION RATE: 20 BRPM | BODY MASS INDEX: 38.36 KG/M2 | DIASTOLIC BLOOD PRESSURE: 84 MMHG | HEIGHT: 76 IN | WEIGHT: 315 LBS | SYSTOLIC BLOOD PRESSURE: 128 MMHG

## 2019-06-26 DIAGNOSIS — B35.1 ONYCHOMYCOSIS OF GREAT TOE: ICD-10-CM

## 2019-06-26 DIAGNOSIS — G47.30 HYPERSOMNIA WITH SLEEP APNEA: ICD-10-CM

## 2019-06-26 DIAGNOSIS — G89.29 CHRONIC LOW BACK PAIN, UNSPECIFIED BACK PAIN LATERALITY, WITH SCIATICA PRESENCE UNSPECIFIED: ICD-10-CM

## 2019-06-26 DIAGNOSIS — K21.9 GASTROESOPHAGEAL REFLUX DISEASE WITHOUT ESOPHAGITIS: ICD-10-CM

## 2019-06-26 DIAGNOSIS — G47.10 HYPERSOMNIA WITH SLEEP APNEA: ICD-10-CM

## 2019-06-26 DIAGNOSIS — M54.5 CHRONIC LOW BACK PAIN, UNSPECIFIED BACK PAIN LATERALITY, WITH SCIATICA PRESENCE UNSPECIFIED: ICD-10-CM

## 2019-06-26 DIAGNOSIS — E66.01 MORBID OBESITY (H): ICD-10-CM

## 2019-06-26 DIAGNOSIS — Z00.00 ENCOUNTER FOR GENERAL ADULT MEDICAL EXAMINATION WITHOUT ABNORMAL FINDINGS: Primary | ICD-10-CM

## 2019-06-26 DIAGNOSIS — F41.1 GAD (GENERALIZED ANXIETY DISORDER): Chronic | ICD-10-CM

## 2019-06-26 DIAGNOSIS — Z12.5 SCREENING FOR PROSTATE CANCER: ICD-10-CM

## 2019-06-26 DIAGNOSIS — E78.5 HYPERLIPIDEMIA LDL GOAL <130: ICD-10-CM

## 2019-06-26 LAB
ALBUMIN SERPL-MCNC: 3.6 G/DL (ref 3.4–5)
ALP SERPL-CCNC: 89 U/L (ref 40–150)
ALT SERPL W P-5'-P-CCNC: 47 U/L (ref 0–70)
ANION GAP SERPL CALCULATED.3IONS-SCNC: 4 MMOL/L (ref 3–14)
AST SERPL W P-5'-P-CCNC: 23 U/L (ref 0–45)
BILIRUB SERPL-MCNC: 0.3 MG/DL (ref 0.2–1.3)
BUN SERPL-MCNC: 26 MG/DL (ref 7–30)
CALCIUM SERPL-MCNC: 9.1 MG/DL (ref 8.5–10.1)
CHLORIDE SERPL-SCNC: 108 MMOL/L (ref 94–109)
CHOLEST SERPL-MCNC: 169 MG/DL
CO2 SERPL-SCNC: 28 MMOL/L (ref 20–32)
CREAT SERPL-MCNC: 0.97 MG/DL (ref 0.66–1.25)
GFR SERPL CREATININE-BSD FRML MDRD: >90 ML/MIN/{1.73_M2}
GLUCOSE SERPL-MCNC: 106 MG/DL (ref 70–99)
HDLC SERPL-MCNC: 41 MG/DL
LDLC SERPL CALC-MCNC: 93 MG/DL
NONHDLC SERPL-MCNC: 128 MG/DL
POTASSIUM SERPL-SCNC: 4.2 MMOL/L (ref 3.4–5.3)
PROT SERPL-MCNC: 7.4 G/DL (ref 6.8–8.8)
PSA SERPL-ACNC: 0.56 UG/L (ref 0–4)
SODIUM SERPL-SCNC: 140 MMOL/L (ref 133–144)
TRIGL SERPL-MCNC: 175 MG/DL

## 2019-06-26 PROCEDURE — 36415 COLL VENOUS BLD VENIPUNCTURE: CPT | Performed by: INTERNAL MEDICINE

## 2019-06-26 PROCEDURE — 80061 LIPID PANEL: CPT | Performed by: INTERNAL MEDICINE

## 2019-06-26 PROCEDURE — 99396 PREV VISIT EST AGE 40-64: CPT | Performed by: INTERNAL MEDICINE

## 2019-06-26 PROCEDURE — G0103 PSA SCREENING: HCPCS | Performed by: INTERNAL MEDICINE

## 2019-06-26 PROCEDURE — 80053 COMPREHEN METABOLIC PANEL: CPT | Performed by: INTERNAL MEDICINE

## 2019-06-26 RX ORDER — HYDROXYZINE HYDROCHLORIDE 25 MG/1
TABLET, FILM COATED ORAL
Qty: 90 TABLET | Refills: 3 | Status: SHIPPED | OUTPATIENT
Start: 2019-06-26 | End: 2020-08-11

## 2019-06-26 RX ORDER — ROSUVASTATIN CALCIUM 10 MG/1
10 TABLET, COATED ORAL DAILY
Qty: 90 TABLET | Refills: 3 | Status: SHIPPED | OUTPATIENT
Start: 2019-06-26 | End: 2020-07-13

## 2019-06-26 RX ORDER — PAROXETINE 20 MG/1
40 TABLET, FILM COATED ORAL AT BEDTIME
COMMUNITY
Start: 2019-06-18 | End: 2019-06-26

## 2019-06-26 RX ORDER — PAROXETINE 40 MG/1
40 TABLET, FILM COATED ORAL AT BEDTIME
Qty: 90 TABLET | Refills: 3 | Status: SHIPPED | OUTPATIENT
Start: 2019-06-26 | End: 2020-07-13

## 2019-06-26 RX ORDER — CICLOPIROX 80 MG/ML
SOLUTION TOPICAL DAILY
Qty: 6 ML | Refills: 1 | Status: SHIPPED | OUTPATIENT
Start: 2019-06-26 | End: 2020-08-11

## 2019-06-26 RX ORDER — PANTOPRAZOLE SODIUM 40 MG/1
40 TABLET, DELAYED RELEASE ORAL DAILY
Qty: 90 TABLET | Refills: 3 | Status: SHIPPED | OUTPATIENT
Start: 2019-06-26 | End: 2020-08-11

## 2019-06-26 ASSESSMENT — MIFFLIN-ST. JEOR: SCORE: 2450.23

## 2019-06-26 NOTE — PROGRESS NOTES
SUBJECTIVE:   CC: Amarjit Malik is an 48 year old male who presents for preventive health visit.     Healthy Habits:    Do you get at least three servings of calcium containing foods daily (dairy, green leafy vegetables, etc.)? no, taking calcium and/or vitamin D supplement: no    Amount of exercise or daily activities, outside of work: 2 day(s) per week    Problems taking medications regularly No    Medication side effects: No    Have you had an eye exam in the past two years? yes    Do you see a dentist twice per year? yes    Do you have sleep apnea, excessive snoring or daytime drowsiness?yes        Today's PHQ-2 Score:   PHQ-2 ( 1999 [employer]) 6/26/2019 7/28/2015   Q1: Little interest or pleasure in doing things 0 0   Q2: Feeling down, depressed or hopeless 0 0   PHQ-2 Score 0 0       Abuse: Current or Past(Physical, Sexual or Emotional)- No  Do you feel safe in your environment? Yes    Social History     Tobacco Use     Smoking status: Never Smoker     Smokeless tobacco: Never Used   Substance Use Topics     Alcohol use: No     Comment: rare     If you drink alcohol do you typically have >3 drinks per day or >7 drinks per week? No                      Last PSA:   PSA   Date Value Ref Range Status   07/07/2017 0.58 0 - 4 ug/L Final     Comment:     Assay Method:  Chemiluminescence using Siemens Vista analyzer       Reviewed orders with patient. Reviewed health maintenance and updated orders accordingly - Yes  Current Outpatient Medications   Medication Sig Dispense Refill     ciclopirox (PENLAC) 8 % external solution Apply topically daily 6 mL 1     diclofenac (VOLTAREN) 50 MG EC tablet Take 1 tablet (50 mg) by mouth 2 times daily as needed for moderate pain 60 tablet 11     hydrOXYzine (ATARAX) 25 MG tablet Take 25-50 mg up to three times daily as needed and  mg at night for sleep and anxiety. 90 tablet 3     losartan (COZAAR) 100 MG tablet Take 1 tablet (100 mg) by mouth daily 90 tablet 3     ORDER FOR  "DME Auto-CPAP:  Max 15 cm H2O  Min 8 cm H2O    Continuous    Lifetime need and heated humidity.    1 each 0     pantoprazole (PROTONIX) 40 MG EC tablet Take 1 tablet (40 mg) by mouth daily 90 tablet 3     PARoxetine (PAXIL) 20 MG tablet Take 40 mg by mouth At Bedtime       rosuvastatin (CRESTOR) 10 MG tablet Take 1 tablet (10 mg) by mouth daily 90 tablet 3       Reviewed and updated as needed this visit by clinical staff  Tobacco  Allergies  Meds  Med Hx  Surg Hx  Fam Hx  Soc Hx        Reviewed and updated as needed this visit by Provider  Tobacco  Med Hx  Surg Hx  Fam Hx  Soc Hx           ROS:  CONSTITUTIONAL: NEGATIVE for fever, chills, change in weight  INTEGUMENTARY/SKIN: NEGATIVE for worrisome rashes, moles or lesions  EYES: NEGATIVE for vision changes or irritation  ENT: NEGATIVE for ear, mouth and throat problems  RESP: NEGATIVE for significant cough or SOB  CV: NEGATIVE for chest pain, palpitations or peripheral edema  GI: NEGATIVE for nausea, abdominal pain, heartburn, or change in bowel habits   male: negative for dysuria, hematuria, decreased urinary stream, erectile dysfunction, urethral discharge  MUSCULOSKELETAL: NEGATIVE for significant arthralgias or myalgia  NEURO: NEGATIVE for weakness, dizziness or paresthesias  PSYCHIATRIC: NEGATIVE for changes in mood or affect    OBJECTIVE:   /84 (BP Location: Right arm, Patient Position: Sitting, Cuff Size: Adult Large)   Pulse 73   Temp 97.6  F (36.4  C) (Tympanic)   Resp 20   Ht 1.93 m (6' 4\")   Wt 147.9 kg (326 lb)   SpO2 94%   BMI 39.68 kg/m    EXAM:  GENERAL: healthy, alert and no distress  EYES: Eyes grossly normal to inspection, PERRL and conjunctivae and sclerae normal  HENT: ear canals and TM's normal, nose and mouth without ulcers or lesions  NECK: no adenopathy, no asymmetry, masses, or scars and thyroid normal to palpation  RESP: lungs clear to auscultation - no rales, rhonchi or wheezes  CV: regular rate and rhythm, normal " "S1 S2, no S3 or S4, no murmur, click or rub, no peripheral edema and peripheral pulses strong  ABDOMEN: soft, nontender, no hepatosplenomegaly, no masses and bowel sounds normal  MS: no gross musculoskeletal defects noted, no edema  SKIN: no suspicious lesions or rashes  NEURO: Normal strength and tone, mentation intact and speech normal  PSYCH: mentation appears normal, affect normal/bright      ASSESSMENT/PLAN:       ICD-10-CM    1. Encounter for general adult medical examination without abnormal findings Z00.00    2. Morbid obesity (H) E66.01 Comprehensive metabolic panel   3. Onychomycosis of great toe B35.1 ciclopirox (PENLAC) 8 % external solution   4. Chronic low back pain, unspecified back pain laterality, with sciatica presence unspecified M54.5 diclofenac (VOLTAREN) 50 MG EC tablet    G89.29    5. ERIC (generalized anxiety disorder) F41.1 hydrOXYzine (ATARAX) 25 MG tablet     PARoxetine (PAXIL) 40 MG tablet   6. Gastroesophageal reflux disease without esophagitis K21.9 pantoprazole (PROTONIX) 40 MG EC tablet   7. Hyperlipidemia LDL goal <130 E78.5 rosuvastatin (CRESTOR) 10 MG tablet     Lipid panel reflex to direct LDL Fasting   8. Hypersomnia with sleep apnea G47.10     G47.30    9. Screening for prostate cancer Z12.5 PSA, screen       COUNSELING:  Reviewed preventive health counseling, as reflected in patient instructions    Estimated body mass index is 39.68 kg/m  as calculated from the following:    Height as of this encounter: 1.93 m (6' 4\").    Weight as of this encounter: 147.9 kg (326 lb).    Weight management plan: Discussed healthy diet and exercise guidelines     reports that he has never smoked. He has never used smokeless tobacco.      Counseling Resources:  ATP IV Guidelines  Pooled Cohorts Equation Calculator  FRAX Risk Assessment  ICSI Preventive Guidelines  Dietary Guidelines for Americans, 2010  USDA's MyPlate  ASA Prophylaxis  Lung CA Screening    Dr. Ashley Crawley, DO  [facility] " Internal Medicine

## 2019-07-18 ENCOUNTER — MYC MEDICAL ADVICE (OUTPATIENT)
Dept: FAMILY MEDICINE | Facility: CLINIC | Age: 49
End: 2019-07-18

## 2019-07-18 DIAGNOSIS — F41.1 GAD (GENERALIZED ANXIETY DISORDER): Chronic | ICD-10-CM

## 2019-07-19 RX ORDER — PAROXETINE 10 MG/1
10 TABLET, FILM COATED ORAL EVERY MORNING
Qty: 90 TABLET | Refills: 3 | Status: SHIPPED | OUTPATIENT
Start: 2019-07-19 | End: 2020-07-13

## 2019-07-19 NOTE — TELEPHONE ENCOUNTER
Please see mychart regarding his paxil medication.    Was taking 40mg, [facility] recommended increase to 50mg.  Are you ok with this increase?    40mg was just sent to pharmacy 6-29-19, paxil does not come in 50mg tablets so 10mg is ready and pharm.    Tiffanie NEWMAN RN

## 2019-07-19 NOTE — TELEPHONE ENCOUNTER
Pt was seeing on 6/26/19 - /84.  No other action needed.  Ladi Alvarado CMA (MARIO)   (aka: Suly Alvarado)

## 2019-10-11 ENCOUNTER — MYC REFILL (OUTPATIENT)
Dept: FAMILY MEDICINE | Facility: CLINIC | Age: 49
End: 2019-10-11

## 2019-10-11 DIAGNOSIS — I10 ESSENTIAL HYPERTENSION: ICD-10-CM

## 2019-10-11 DIAGNOSIS — E78.5 HYPERLIPIDEMIA LDL GOAL <130: ICD-10-CM

## 2019-10-14 ENCOUNTER — MYC REFILL (OUTPATIENT)
Dept: FAMILY MEDICINE | Facility: CLINIC | Age: 49
End: 2019-10-14

## 2019-10-14 DIAGNOSIS — E78.5 HYPERLIPIDEMIA LDL GOAL <130: ICD-10-CM

## 2019-10-14 DIAGNOSIS — I10 ESSENTIAL HYPERTENSION: ICD-10-CM

## 2019-10-14 RX ORDER — LOSARTAN POTASSIUM 100 MG/1
100 TABLET ORAL DAILY
Qty: 90 TABLET | Refills: 3 | Status: CANCELLED | OUTPATIENT
Start: 2019-10-14

## 2019-10-14 NOTE — TELEPHONE ENCOUNTER
"Requested Prescriptions   Pending Prescriptions Disp Refills     losartan (COZAAR) 100 MG tablet 90 tablet 3     Sig: Take 1 tablet (100 mg) by mouth daily   Last Written Prescription Date:  9/28/18  Last Fill Quantity: 90 tab,  # refills: 3   Last office visit: 6/26/2019 with prescribing provider:  Ashley Crawley     Future Office Visit:        Angiotensin-II Receptors Passed - 10/14/2019  8:49 AM        Passed - Last blood pressure under 140/90 in past 12 months     BP Readings from Last 3 Encounters:   06/26/19 128/84   02/07/19 (!) 141/94   07/31/18 (!) 124/92                 Passed - Recent (12 mo) or future (30 days) visit within the authorizing provider's specialty     Patient has had an office visit with the authorizing provider or a provider within the authorizing providers department within the previous 12 mos or has a future within next 30 days. See \"Patient Info\" tab in inbasket, or \"Choose Columns\" in Meds & Orders section of the refill encounter.              Passed - Medication is active on med list        Passed - Patient is age 18 or older        Passed - Normal serum creatinine on file in past 12 months     Recent Labs   Lab Test 06/26/19  0926   CR 0.97             Passed - Normal serum potassium on file in past 12 months     Recent Labs   Lab Test 06/26/19  0926   POTASSIUM 4.2                      "

## 2019-10-14 NOTE — TELEPHONE ENCOUNTER
"Requested Prescriptions   Pending Prescriptions Disp Refills     losartan (COZAAR) 100 MG tablet [Pharmacy Med Name: LOSARTAN POTASSIUM 100MG TABS] 90 tablet 3     Sig: TAKE ONE TABLET BY MOUTH EVERY DAY       Angiotensin-II Receptors Passed - 10/14/2019  3:27 PM        Passed - Last blood pressure under 140/90 in past 12 months     BP Readings from Last 3 Encounters:   06/26/19 128/84   02/07/19 (!) 141/94   07/31/18 (!) 124/92                 Passed - Recent (12 mo) or future (30 days) visit within the authorizing provider's specialty     Patient has had an office visit with the authorizing provider or a provider within the authorizing providers department within the previous 12 mos or has a future within next 30 days. See \"Patient Info\" tab in inbasket, or \"Choose Columns\" in Meds & Orders section of the refill encounter.              Passed - Medication is active on med list        Passed - Patient is age 18 or older        Passed - Normal serum creatinine on file in past 12 months     Recent Labs   Lab Test 06/26/19  0926   CR 0.97             Passed - Normal serum potassium on file in past 12 months     Recent Labs   Lab Test 06/26/19  0926   POTASSIUM 4.2                    Last Written Prescription Date:  9/28/2018  Last Fill Quantity: 90,  # refills: 3   Last office visit: 6/26/2019 with prescribing provider:  Misbah   Future Office Visit:      "

## 2019-10-15 RX ORDER — LOSARTAN POTASSIUM 100 MG/1
100 TABLET ORAL DAILY
Qty: 30 TABLET | Refills: 0 | Status: SHIPPED | OUTPATIENT
Start: 2019-10-15 | End: 2019-11-21

## 2019-10-15 NOTE — TELEPHONE ENCOUNTER
"Requested Prescriptions   Pending Prescriptions Disp Refills     losartan (COZAAR) 100 MG tablet 90 tablet 3     Sig: Take 1 tablet (100 mg) by mouth daily       Angiotensin-II Receptors Passed - 10/15/2019  1:57 PM        Passed - Last blood pressure under 140/90 in past 12 months     BP Readings from Last 3 Encounters:   06/26/19 128/84   02/07/19 (!) 141/94   07/31/18 (!) 124/92                 Passed - Recent (12 mo) or future (30 days) visit within the authorizing provider's specialty     Patient has had an office visit with the authorizing provider or a provider within the authorizing providers department within the previous 12 mos or has a future within next 30 days. See \"Patient Info\" tab in inbasket, or \"Choose Columns\" in Meds & Orders section of the refill encounter.              Passed - Medication is active on med list        Passed - Patient is age 18 or older        Passed - Normal serum creatinine on file in past 12 months     Recent Labs   Lab Test 06/26/19  0926   CR 0.97             Passed - Normal serum potassium on file in past 12 months     Recent Labs   Lab Test 06/26/19  0926   POTASSIUM 4.2                    Last Written Prescription Date:  9/28/2018  Last Fill Quantity: 90,  # refills: 3   Last office visit: 6/26/2019 with prescribing provider:  Misbah   Future Office Visit:      "

## 2019-10-15 NOTE — TELEPHONE ENCOUNTER
Dr. Crawley, or covering provider,    Spoke to the patient as his last 2 bp's were elevated.  Per last dictation from Aug 2019 patient needing to schedule a wellness visit.  Patient states he is out of town for 2 more weeks and will do this when he gets back if he can get one more refill.  Please advise. Princess BLACKMAN RN

## 2019-10-16 RX ORDER — LOSARTAN POTASSIUM 100 MG/1
TABLET ORAL
Qty: 90 TABLET | Refills: 3 | OUTPATIENT
Start: 2019-10-16

## 2019-11-21 ENCOUNTER — MYC REFILL (OUTPATIENT)
Dept: FAMILY MEDICINE | Facility: CLINIC | Age: 49
End: 2019-11-21

## 2019-11-21 DIAGNOSIS — I10 ESSENTIAL HYPERTENSION: ICD-10-CM

## 2019-11-21 DIAGNOSIS — E78.5 HYPERLIPIDEMIA LDL GOAL <130: ICD-10-CM

## 2019-11-22 DIAGNOSIS — I10 ESSENTIAL HYPERTENSION: ICD-10-CM

## 2019-11-22 DIAGNOSIS — E78.5 HYPERLIPIDEMIA LDL GOAL <130: ICD-10-CM

## 2019-11-22 RX ORDER — LOSARTAN POTASSIUM 100 MG/1
TABLET ORAL
Qty: 30 TABLET | Refills: 0 | OUTPATIENT
Start: 2019-11-22

## 2019-11-22 RX ORDER — LOSARTAN POTASSIUM 100 MG/1
100 TABLET ORAL DAILY
Qty: 14 TABLET | Refills: 0 | Status: SHIPPED | OUTPATIENT
Start: 2019-11-22 | End: 2019-12-05

## 2019-11-22 NOTE — TELEPHONE ENCOUNTER
"Requested Prescriptions   Pending Prescriptions Disp Refills     losartan (COZAAR) 100 MG tablet [Pharmacy Med Name: LOSARTAN POTASSIUM 100MG TABS] 30 tablet 0     Sig: TAKE ONE TABLET BY MOUTH EVERY DAY       Angiotensin-II Receptors Passed - 11/22/2019  8:32 AM        Passed - Last blood pressure under 140/90 in past 12 months     BP Readings from Last 3 Encounters:   06/26/19 128/84   02/07/19 (!) 141/94   07/31/18 (!) 124/92                 Passed - Recent (12 mo) or future (30 days) visit within the authorizing provider's specialty     Patient has had an office visit with the authorizing provider or a provider within the authorizing providers department within the previous 12 mos or has a future within next 30 days. See \"Patient Info\" tab in inbasket, or \"Choose Columns\" in Meds & Orders section of the refill encounter.              Passed - Medication is active on med list        Passed - Patient is age 18 or older        Passed - Normal serum creatinine on file in past 12 months     Recent Labs   Lab Test 06/26/19  0926   CR 0.97             Passed - Normal serum potassium on file in past 12 months     Recent Labs   Lab Test 06/26/19  0926   POTASSIUM 4.2                    Last Written Prescription Date:  10/15/19  Last Fill Quantity: 30,  # refills: 0   Last office visit: 6/26/2019 with prescribing provider:  Misbah   Future Office Visit:   Next 5 appointments (look out 90 days)    Nov 27, 2019 12:40 PM NAHID Dumont with [facility] Crawley,   [facility]  Arrive at: [facility] [address] [place] [id number]  [phone]           "

## 2019-11-22 NOTE — TELEPHONE ENCOUNTER
Medication is being filled for 1 time refill only due to:  Patient needs to be seen because due for OV.   Appt scheduled 11-27-19.  Tiffanie NEWMAN RN

## 2019-12-05 ENCOUNTER — OFFICE VISIT (OUTPATIENT)
Dept: FAMILY MEDICINE | Facility: CLINIC | Age: 49
End: 2019-12-05
Payer: COMMERCIAL

## 2019-12-05 ENCOUNTER — OFFICE VISIT (OUTPATIENT)
Dept: PODIATRY | Facility: CLINIC | Age: 49
End: 2019-12-05
Payer: COMMERCIAL

## 2019-12-05 VITALS
OXYGEN SATURATION: 94 % | HEIGHT: 76 IN | TEMPERATURE: 98.2 F | SYSTOLIC BLOOD PRESSURE: 112 MMHG | DIASTOLIC BLOOD PRESSURE: 80 MMHG | WEIGHT: 315 LBS | HEART RATE: 92 BPM | RESPIRATION RATE: 16 BRPM | BODY MASS INDEX: 38.36 KG/M2

## 2019-12-05 VITALS
HEIGHT: 76 IN | DIASTOLIC BLOOD PRESSURE: 101 MMHG | SYSTOLIC BLOOD PRESSURE: 144 MMHG | WEIGHT: 315 LBS | BODY MASS INDEX: 38.36 KG/M2 | HEART RATE: 95 BPM

## 2019-12-05 DIAGNOSIS — I10 BENIGN ESSENTIAL HYPERTENSION: Chronic | ICD-10-CM

## 2019-12-05 DIAGNOSIS — L60.0 INGROWN TOENAIL OF LEFT FOOT: Primary | ICD-10-CM

## 2019-12-05 DIAGNOSIS — E78.5 HYPERLIPIDEMIA LDL GOAL <130: ICD-10-CM

## 2019-12-05 DIAGNOSIS — I10 ESSENTIAL HYPERTENSION: Primary | ICD-10-CM

## 2019-12-05 PROCEDURE — 99213 OFFICE O/P EST LOW 20 MIN: CPT | Performed by: INTERNAL MEDICINE

## 2019-12-05 PROCEDURE — 11750 EXCISION NAIL&NAIL MATRIX: CPT | Mod: TA | Performed by: PODIATRIST

## 2019-12-05 RX ORDER — LOSARTAN POTASSIUM 100 MG/1
100 TABLET ORAL DAILY
Qty: 90 TABLET | Refills: 3 | Status: SHIPPED | OUTPATIENT
Start: 2019-12-05 | End: 2020-08-11

## 2019-12-05 ASSESSMENT — MIFFLIN-ST. JEOR
SCORE: 2476.98
SCORE: 2445.23

## 2019-12-05 NOTE — LETTER
"    12/5/2019         RE: Amarjit Malik  [address] [address]        Dear Colleague,    Thank you for referring your patient, Amarjit Malik, to the [facility]. Please see a copy of my visit note below.    Amarjit returns to clinic with a recurring ingrown toenail on the left great toe.  The patient relates that he notices severe pain around the nail border of the left great toe.  The patient denies any redness or drainage.     PAST MEDICAL HISTORY:   Past Medical History:   Diagnosis Date     Anxiety      Gastro-oesophageal reflux disease      Obesity (BMI 30-39.9)      Obstructive sleep apnea (adult) (pediatric)      panic attacks, and anxiety  1/5/2006    stable on paxil     PONV (postoperative nausea and vomiting)      Pure hypercholesterolemia      Unspecified essential hypertension          EXAM:Vitals: BP (!) 144/101   Pulse 95   Ht 1.93 m (6' 4\")   Wt 147.9 kg (326 lb)   BMI 39.68 kg/m     BMI= Body mass index is 39.68 kg/m .  Weight management plan: Patient was referred to their PCP to discuss a diet and exercise plan.    General appearance: Patient is alert and fully cooperative with history & exam.  No sign of distress is noted during the visit.     Psychiatric: Affect is pleasant & appropriate.  Patient appears motivated to improve health.     Respiratory: Breathing is regular & unlabored while sitting.     HEENT: Hearing is intact to spoken word.  Speech is clear.  No gross evidence of visual impairment that would impact ambulation.     Dermatologic: Skin is intact to both lower extremities without significant lesions, rash or abrasion.  Noted paronychia.     Vascular: DP & PT pulses are intact & regular bilaterally.  No significant edema or varicosities noted.  CFT and skin temperature is normal to both lower extremities.     Neurologic: Lower extremity sensation is intact to light touch.  No evidence of weakness or contracture in the lower " extremities.  No evidence of neuropathy.     Musculoskeletal: Patient is ambulatory without assistive device or brace.  No gross ankle deformity noted.  No foot or ankle joint effusion is noted.    One notes an inflamed nail border of the left great toe.  There is noted erythema and edema located around the labial fold and does not extend past the interphalangeal joint.  There is no apparent purulent drainage noted.  There is pain on palpation to the proximal aspect of the nail border.      Assessment:  1.  Paranychia of the left great toe.      Plan:  I have explained to Amarjit about the condition.  The potential causes and nature of an ingrown toenail were discussed with the patient.  We reviewed the natural history/prognosis of the condition and potential risks if no treatment is provided.      Treatment options discussed included conservative management (oral antibiotics, soaking of foot, adequate width shoes)  as well as surgical management (partial or total nail removal).  The pros and cons of both forms of treatment were reviewed.      After thorough discussion and answering all questions, the patient elected to proceed with the procedure.    At this point, I recommended having the offending nail border permanently removed.  I have explained to the patient all of the possible risks, benefits, alternatives to the procedure.  The patient consented to the proposed procedure.  The left hallux was swabbed with alcohol.  Next, approximately 5 cc of 1% lidocaine plain was injected around the left hallux.  The left hallux was then prepped with Betadine ointment.  A tourniquet was applied to the left hallux.  The offending nail border was split using an English anvil back to the matrix.  Next, utilizing a straight hemostat the offending nail border was avulsed with the attached matrix.  The wound bed was debrided on any remaining nail, matrix and skin.  Next, the offending nail border was treated with 89% phenol using  microtip cotton applicators for 30 seconds.  The wound was then irrigated and dressed with Triple antibiotic ointment and a compressive dry sterile dressing.  The tourniquet was removed and a prompt hyperemic response was noted.  The patient tolerated the procedure well with no complications.  The patient was given post procedure instructions for the care of the wound.      Disclaimer: This note consists of symbols derived from keyboarding, dictation and/or voice recognition software. As a result, there may be errors in the script that have gone undetected. Please consider this when interpreting information found in this chart.      GIA Araya D.P.M., F.A.C.F.A.S.        Again, thank you for allowing me to participate in the care of your patient.        Sincerely,        Leandro Araya DPM

## 2019-12-05 NOTE — PATIENT INSTRUCTIONS
Post toenail procedure instructions:    1.  Keep bandage on the rest of the day; take off in the morning.  2.  Soak the toe in warm water with Epsom's Salt or Dreft detergent for 20 min.  3.  Clean out any scab tissue from the treated area with a soapy cloth.  4.  Apply a topical antibiotic to the treated area and bandage with a Band-Aid.  5.  Repeat morning and night for two weeks    Amarjit to follow up with Primary Care provider regarding elevated blood pressure.

## 2019-12-05 NOTE — PATIENT INSTRUCTIONS
Thank you for choosing [facility].  You may be receiving an email and/or telephone survey request from [facility] [employer] regarding your visit today.  Please take a few minutes to respond to the survey to let us know how we are doing.      If you have questions or concerns, please contact us via [email or web address] or you can contact your care team at [phone].    Our Clinic hours are:  Monday 6:40 am  to 7:00 pm  Tuesday -Friday 6:40 am to 5:00 pm    The [place] outpatient lab hours are:  Monday - Friday 6:10 am to 4:45 pm  Saturdays 7:00 am to 11:00 am  Appointments are required, call [phone]    If you have clinical questions after hours or would like to schedule an appointment,  call the clinic at [phone].

## 2019-12-05 NOTE — NURSING NOTE
"Chief Complaint   Patient presents with     Ingrown Toenail     Great left toenail       Initial BP (!) 144/101   Pulse 95   Ht 1.93 m (6' 4\")   Wt 147.9 kg (326 lb)   BMI 39.68 kg/m   Estimated body mass index is 39.68 kg/m  as calculated from the following:    Height as of this encounter: 1.93 m (6' 4\").    Weight as of this encounter: 147.9 kg (326 lb).  Medications and allergies reviewed.      Lissette HERNANDEZ MA    "

## 2019-12-05 NOTE — PROGRESS NOTES
Subjective     Amarjit Malik is a 49 year old male who presents to clinic today for the following health issues:    HPI   Hypertension Follow-up      Do you check your blood pressure regularly outside of the clinic? Yes     Are you following a low salt diet? No    Are your blood pressures ever more than 140 on the top number (systolic) OR more   than 90 on the bottom number (diastolic), for example 140/90? No      How many servings of fruits and vegetables do you eat daily?  2-3    On average, how many sweetened beverages do you drink each day (Examples: soda, juice, sweet tea, etc.  Do NOT count diet or artificially sweetened beverages)?   0    How many days per week do you miss taking your medication? 0    He notes left arm typically has a lower blood pressure than the right arm, since age 20 or so.    Medication Followup of losartan     Taking Medication as prescribed: yes    Side Effects:  None    Medication Helping Symptoms:  yes         Current Outpatient Medications   Medication Sig Dispense Refill     diclofenac (VOLTAREN) 50 MG EC tablet Take 1 tablet (50 mg) by mouth 2 times daily as needed for moderate pain 60 tablet 11     hydrOXYzine (ATARAX) 25 MG tablet Take 25-50 mg up to three times daily as needed and  mg at night for sleep and anxiety. 90 tablet 3     losartan (COZAAR) 100 MG tablet Take 1 tablet (100 mg) by mouth daily 14 tablet 0     pantoprazole (PROTONIX) 40 MG EC tablet Take 1 tablet (40 mg) by mouth daily 90 tablet 3     PARoxetine (PAXIL) 10 MG tablet Take 1 tablet (10 mg) by mouth every morning In addition to one 40mg tablets for a daily total of 50mg 90 tablet 3     PARoxetine (PAXIL) 40 MG tablet Take 1 tablet (40 mg) by mouth At Bedtime 90 tablet 3     rosuvastatin (CRESTOR) 10 MG tablet Take 1 tablet (10 mg) by mouth daily 90 tablet 3     ciclopirox (PENLAC) 8 % external solution Apply topically daily 6 mL 1     ORDER FOR DME Auto-CPAP:  Max 15 cm H2O  Min 8 cm  "H2O    Continuous    Lifetime need and heated humidity.    1 each 0         Reviewed and updated as needed this visit by Provider         Review of Systems   ROS COMP: Constitutional, HEENT, cardiovascular, pulmonary, gi and gu systems are negative, except as otherwise noted.      Objective    /80   Pulse 92   Temp 98.2  F (36.8  C) (Tympanic)   Resp 16   Ht 1.93 m (6' 4\")   Wt (!) 151 kg (333 lb)   SpO2 94%   BMI 40.53 kg/m    Body mass index is 40.53 kg/m .  Physical Exam   GENERAL APPEARANCE: healthy, alert and no distress          Assessment & Plan       ICD-10-CM    1. Essential hypertension I10 losartan (COZAAR) 100 MG tablet   2. Hyperlipidemia LDL goal <130 E78.5 losartan (COZAAR) 100 MG tablet   3. Benign essential hypertension I10               Return in about 6 months (around 6/5/2020) for Annual Wellness Check-Up.    Ashley Crawley, DO  [facility]      "

## 2019-12-05 NOTE — PROGRESS NOTES
"Amarjit returns to clinic with a recurring ingrown toenail on the left great toe.  The patient relates that he notices severe pain around the nail border of the left great toe.  The patient denies any redness or drainage.     PAST MEDICAL HISTORY:   Past Medical History:   Diagnosis Date     Anxiety      Gastro-oesophageal reflux disease      Obesity (BMI 30-39.9)      Obstructive sleep apnea (adult) (pediatric)      panic attacks, and anxiety  1/5/2006    stable on paxil     PONV (postoperative nausea and vomiting)      Pure hypercholesterolemia      Unspecified essential hypertension          EXAM:Vitals: BP (!) 144/101   Pulse 95   Ht 1.93 m (6' 4\")   Wt 147.9 kg (326 lb)   BMI 39.68 kg/m    BMI= Body mass index is 39.68 kg/m .  Weight management plan: Patient was referred to their PCP to discuss a diet and exercise plan.    General appearance: Patient is alert and fully cooperative with history & exam.  No sign of distress is noted during the visit.     Psychiatric: Affect is pleasant & appropriate.  Patient appears motivated to improve health.     Respiratory: Breathing is regular & unlabored while sitting.     HEENT: Hearing is intact to spoken word.  Speech is clear.  No gross evidence of visual impairment that would impact ambulation.     Dermatologic: Skin is intact to both lower extremities without significant lesions, rash or abrasion.  Noted paronychia.     Vascular: DP & PT pulses are intact & regular bilaterally.  No significant edema or varicosities noted.  CFT and skin temperature is normal to both lower extremities.     Neurologic: Lower extremity sensation is intact to light touch.  No evidence of weakness or contracture in the lower extremities.  No evidence of neuropathy.     Musculoskeletal: Patient is ambulatory without assistive device or brace.  No gross ankle deformity noted.  No foot or ankle joint effusion is noted.    One notes an inflamed nail border of the left great toe.  There is " noted erythema and edema located around the labial fold and does not extend past the interphalangeal joint.  There is no apparent purulent drainage noted.  There is pain on palpation to the proximal aspect of the nail border.      Assessment:  1.  Paranychia of the left great toe.      Plan:  I have explained to Amarjit about the condition.  The potential causes and nature of an ingrown toenail were discussed with the patient.  We reviewed the natural history/prognosis of the condition and potential risks if no treatment is provided.      Treatment options discussed included conservative management (oral antibiotics, soaking of foot, adequate width shoes)  as well as surgical management (partial or total nail removal).  The pros and cons of both forms of treatment were reviewed.      After thorough discussion and answering all questions, the patient elected to proceed with the procedure.    At this point, I recommended having the offending nail border permanently removed.  I have explained to the patient all of the possible risks, benefits, alternatives to the procedure.  The patient consented to the proposed procedure.  The left hallux was swabbed with alcohol.  Next, approximately 5 cc of 1% lidocaine plain was injected around the left hallux.  The left hallux was then prepped with Betadine ointment.  A tourniquet was applied to the left hallux.  The offending nail border was split using an English anvil back to the matrix.  Next, utilizing a straight hemostat the offending nail border was avulsed with the attached matrix.  The wound bed was debrided on any remaining nail, matrix and skin.  Next, the offending nail border was treated with 89% phenol using microtip cotton applicators for 30 seconds.  The wound was then irrigated and dressed with Triple antibiotic ointment and a compressive dry sterile dressing.  The tourniquet was removed and a prompt hyperemic response was noted.  The patient tolerated the procedure  well with no complications.  The patient was given post procedure instructions for the care of the wound.      Disclaimer: This note consists of symbols derived from keyboarding, dictation and/or voice recognition software. As a result, there may be errors in the script that have gone undetected. Please consider this when interpreting information found in this chart.      GIA Araya D.P.M., [email or web address].

## 2020-03-06 ENCOUNTER — COMMUNICATION - HEALTHEAST (OUTPATIENT)
Dept: SCHEDULING | Facility: CLINIC | Age: 50
End: 2020-03-06

## 2020-07-13 ENCOUNTER — MYC MEDICAL ADVICE (OUTPATIENT)
Dept: FAMILY MEDICINE | Facility: CLINIC | Age: 50
End: 2020-07-13

## 2020-07-13 DIAGNOSIS — F41.1 GAD (GENERALIZED ANXIETY DISORDER): Chronic | ICD-10-CM

## 2020-07-13 RX ORDER — PAROXETINE 40 MG/1
40 TABLET, FILM COATED ORAL AT BEDTIME
Qty: 90 TABLET | Refills: 1 | Status: SHIPPED | OUTPATIENT
Start: 2020-07-13 | End: 2020-08-11

## 2020-07-13 RX ORDER — PAROXETINE 10 MG/1
10 TABLET, FILM COATED ORAL EVERY MORNING
Qty: 90 TABLET | Refills: 1 | Status: SHIPPED | OUTPATIENT
Start: 2020-07-13 | End: 2020-08-11

## 2020-07-13 ASSESSMENT — ANXIETY QUESTIONNAIRES
1. FEELING NERVOUS, ANXIOUS, OR ON EDGE: NOT AT ALL
7. FEELING AFRAID AS IF SOMETHING AWFUL MIGHT HAPPEN: NOT AT ALL
2. NOT BEING ABLE TO STOP OR CONTROL WORRYING: NOT AT ALL
4. TROUBLE RELAXING: SEVERAL DAYS
7. FEELING AFRAID AS IF SOMETHING AWFUL MIGHT HAPPEN: NOT AT ALL
6. BECOMING EASILY ANNOYED OR IRRITABLE: NOT AT ALL
GAD7 TOTAL SCORE: 1
5. BEING SO RESTLESS THAT IT IS HARD TO SIT STILL: NOT AT ALL
GAD7 TOTAL SCORE: 1
3. WORRYING TOO MUCH ABOUT DIFFERENT THINGS: NOT AT ALL

## 2020-07-13 ASSESSMENT — PATIENT HEALTH QUESTIONNAIRE - PHQ9
SUM OF ALL RESPONSES TO PHQ QUESTIONS 1-9: 2
10. IF YOU CHECKED OFF ANY PROBLEMS, HOW DIFFICULT HAVE THESE PROBLEMS MADE IT FOR YOU TO DO YOUR WORK, TAKE CARE OF THINGS AT HOME, OR GET ALONG WITH OTHER PEOPLE: NOT DIFFICULT AT ALL
SUM OF ALL RESPONSES TO PHQ QUESTIONS 1-9: 2

## 2020-07-14 ASSESSMENT — ANXIETY QUESTIONNAIRES: GAD7 TOTAL SCORE: 1

## 2020-07-14 ASSESSMENT — PATIENT HEALTH QUESTIONNAIRE - PHQ9: SUM OF ALL RESPONSES TO PHQ QUESTIONS 1-9: 2

## 2020-08-03 ENCOUNTER — E-VISIT (OUTPATIENT)
Dept: FAMILY MEDICINE | Facility: CLINIC | Age: 50
End: 2020-08-03
Payer: COMMERCIAL

## 2020-08-03 DIAGNOSIS — Z52.001 DONOR OF STEM CELL: Primary | ICD-10-CM

## 2020-08-03 PROCEDURE — 99207 ZZC NO BILLABLE SERVICE THIS VISIT: CPT | Performed by: INTERNAL MEDICINE

## 2020-08-04 DIAGNOSIS — G89.29 CHRONIC LOW BACK PAIN, UNSPECIFIED BACK PAIN LATERALITY, UNSPECIFIED WHETHER SCIATICA PRESENT: Primary | ICD-10-CM

## 2020-08-04 DIAGNOSIS — M54.50 CHRONIC LOW BACK PAIN, UNSPECIFIED BACK PAIN LATERALITY, UNSPECIFIED WHETHER SCIATICA PRESENT: Primary | ICD-10-CM

## 2020-08-04 NOTE — TELEPHONE ENCOUNTER
Dr. Crawley,    Routing refill request to provider for review/approval because:  Labs not current:  Please advise on refill. Princess BLACKMAN RN

## 2020-08-04 NOTE — TELEPHONE ENCOUNTER
Pending Prescriptions:                       Disp   Refills    diclofenac (VOLTAREN) 50 MG EC tablet     60 tab*11           Sig: Take 1 tablet (50 mg) by mouth 2 times daily as           needed for moderate pain      [facility] - [address]  [address] [place] [address]  Phone: [phone] Fax: [phone]      Next 5 appointments (look out 90 days)    Aug 11, 2020 11:20 AM CDT  SHORT with Ashley Crawley,   [facility]  Arrive at: [facility] [address] [id number] [phone]      Needs a few more to get to appointment.     Henrietta Seay on 8/4/2020 at 9:13 AM

## 2020-08-04 NOTE — TELEPHONE ENCOUNTER
"Requested Prescriptions   Pending Prescriptions Disp Refills     diclofenac (VOLTAREN) 50 MG EC tablet 60 tablet 11     Sig: Take 1 tablet (50 mg) by mouth 2 times daily as needed for moderate pain       NSAID Medications Failed - 8/4/2020  9:22 AM        Failed - Normal ALT on file in past 12 months     Recent Labs   Lab Test 06/26/19  0926   ALT 47             Failed - Normal AST on file in past 12 months     Recent Labs   Lab Test 06/26/19  0926   AST 23             Failed - Normal CBC on file in past 12 months     Recent Labs   Lab Test 01/03/14  1549   WBC 9.7   RBC 3.89*   HGB 11.1*   HCT 33.7*                    Failed - Normal serum creatinine on file in past 12 months     Recent Labs   Lab Test 06/26/19  0926   CR 0.97       Ok to refill medication if creatinine is low          Passed - Blood pressure under 140/90 in past 12 months     BP Readings from Last 3 Encounters:   12/05/19 112/80   12/05/19 (!) 144/101   06/26/19 128/84                 Passed - Recent (12 mo) or future (30 days) visit within the authorizing provider's specialty     Patient has had an office visit with the authorizing provider or a provider within the authorizing providers department within the previous 12 mos or has a future within next 30 days. See \"Patient Info\" tab in inbasket, or \"Choose Columns\" in Meds & Orders section of the refill encounter.              Passed - Patient is age 6-64 years        Passed - Medication is active on med list             "

## 2020-08-11 ENCOUNTER — OFFICE VISIT (OUTPATIENT)
Dept: FAMILY MEDICINE | Facility: CLINIC | Age: 50
End: 2020-08-11
Payer: COMMERCIAL

## 2020-08-11 VITALS
DIASTOLIC BLOOD PRESSURE: 84 MMHG | BODY MASS INDEX: 38.36 KG/M2 | WEIGHT: 315 LBS | HEART RATE: 76 BPM | RESPIRATION RATE: 16 BRPM | TEMPERATURE: 97.1 F | SYSTOLIC BLOOD PRESSURE: 122 MMHG | OXYGEN SATURATION: 96 % | HEIGHT: 76 IN

## 2020-08-11 DIAGNOSIS — I10 ESSENTIAL HYPERTENSION: ICD-10-CM

## 2020-08-11 DIAGNOSIS — Z12.5 SCREENING FOR PROSTATE CANCER: ICD-10-CM

## 2020-08-11 DIAGNOSIS — B35.1 ONYCHOMYCOSIS OF GREAT TOE: ICD-10-CM

## 2020-08-11 DIAGNOSIS — Z00.00 ROUTINE GENERAL MEDICAL EXAMINATION AT A HEALTH CARE FACILITY: Primary | ICD-10-CM

## 2020-08-11 DIAGNOSIS — F41.1 GAD (GENERALIZED ANXIETY DISORDER): Chronic | ICD-10-CM

## 2020-08-11 DIAGNOSIS — E78.5 HYPERLIPIDEMIA LDL GOAL <130: ICD-10-CM

## 2020-08-11 DIAGNOSIS — K21.9 GASTROESOPHAGEAL REFLUX DISEASE WITHOUT ESOPHAGITIS: ICD-10-CM

## 2020-08-11 DIAGNOSIS — R68.82 LOW LIBIDO: ICD-10-CM

## 2020-08-11 DIAGNOSIS — Z87.898 HISTORY OF FEVER: ICD-10-CM

## 2020-08-11 LAB
ANION GAP SERPL CALCULATED.3IONS-SCNC: 5 MMOL/L (ref 3–14)
BUN SERPL-MCNC: 18 MG/DL (ref 7–30)
CALCIUM SERPL-MCNC: 9.2 MG/DL (ref 8.5–10.1)
CHLORIDE SERPL-SCNC: 103 MMOL/L (ref 94–109)
CHOLEST SERPL-MCNC: 180 MG/DL
CO2 SERPL-SCNC: 29 MMOL/L (ref 20–32)
CREAT SERPL-MCNC: 1.07 MG/DL (ref 0.66–1.25)
GFR SERPL CREATININE-BSD FRML MDRD: 80 ML/MIN/{1.73_M2}
GLUCOSE SERPL-MCNC: 88 MG/DL (ref 70–99)
HDLC SERPL-MCNC: 47 MG/DL
LDLC SERPL CALC-MCNC: 106 MG/DL
NONHDLC SERPL-MCNC: 133 MG/DL
POTASSIUM SERPL-SCNC: 3.9 MMOL/L (ref 3.4–5.3)
PSA SERPL-ACNC: 0.53 UG/L (ref 0–4)
SODIUM SERPL-SCNC: 137 MMOL/L (ref 133–144)
TRIGL SERPL-MCNC: 133 MG/DL

## 2020-08-11 PROCEDURE — 84270 ASSAY OF SEX HORMONE GLOBUL: CPT | Performed by: INTERNAL MEDICINE

## 2020-08-11 PROCEDURE — 80061 LIPID PANEL: CPT | Performed by: INTERNAL MEDICINE

## 2020-08-11 PROCEDURE — 86769 SARS-COV-2 COVID-19 ANTIBODY: CPT | Performed by: INTERNAL MEDICINE

## 2020-08-11 PROCEDURE — 84403 ASSAY OF TOTAL TESTOSTERONE: CPT | Performed by: INTERNAL MEDICINE

## 2020-08-11 PROCEDURE — G0103 PSA SCREENING: HCPCS | Performed by: INTERNAL MEDICINE

## 2020-08-11 PROCEDURE — 99214 OFFICE O/P EST MOD 30 MIN: CPT | Mod: 25 | Performed by: INTERNAL MEDICINE

## 2020-08-11 PROCEDURE — 99396 PREV VISIT EST AGE 40-64: CPT | Performed by: INTERNAL MEDICINE

## 2020-08-11 PROCEDURE — 80048 BASIC METABOLIC PNL TOTAL CA: CPT | Performed by: INTERNAL MEDICINE

## 2020-08-11 PROCEDURE — 36415 COLL VENOUS BLD VENIPUNCTURE: CPT | Performed by: INTERNAL MEDICINE

## 2020-08-11 RX ORDER — LOSARTAN POTASSIUM 100 MG/1
100 TABLET ORAL DAILY
Qty: 90 TABLET | Refills: 3 | Status: SHIPPED | OUTPATIENT
Start: 2020-08-11 | End: 2021-08-20

## 2020-08-11 RX ORDER — HYDROXYZINE HYDROCHLORIDE 25 MG/1
TABLET, FILM COATED ORAL
Qty: 30 TABLET | Refills: 3 | Status: SHIPPED | OUTPATIENT
Start: 2020-08-11 | End: 2022-08-09

## 2020-08-11 RX ORDER — ROSUVASTATIN CALCIUM 10 MG/1
10 TABLET, COATED ORAL DAILY
Qty: 90 TABLET | Refills: 3 | Status: SHIPPED | OUTPATIENT
Start: 2020-08-11 | End: 2021-08-13

## 2020-08-11 RX ORDER — PAROXETINE 40 MG/1
40 TABLET, FILM COATED ORAL AT BEDTIME
Qty: 90 TABLET | Refills: 3 | Status: SHIPPED | OUTPATIENT
Start: 2020-08-11 | End: 2021-08-13

## 2020-08-11 RX ORDER — CICLOPIROX 80 MG/ML
SOLUTION TOPICAL DAILY
Qty: 6 ML | Refills: 1 | Status: SHIPPED | OUTPATIENT
Start: 2020-08-11 | End: 2022-04-07

## 2020-08-11 RX ORDER — PANTOPRAZOLE SODIUM 40 MG/1
40 TABLET, DELAYED RELEASE ORAL DAILY
Qty: 90 TABLET | Refills: 3 | Status: SHIPPED | OUTPATIENT
Start: 2020-08-11 | End: 2021-08-13

## 2020-08-11 RX ORDER — PAROXETINE 10 MG/1
10 TABLET, FILM COATED ORAL EVERY MORNING
Qty: 90 TABLET | Refills: 3 | Status: SHIPPED | OUTPATIENT
Start: 2020-08-11 | End: 2021-08-13

## 2020-08-11 ASSESSMENT — MIFFLIN-ST. JEOR: SCORE: 2431.15

## 2020-08-11 ASSESSMENT — ANXIETY QUESTIONNAIRES
6. BECOMING EASILY ANNOYED OR IRRITABLE: MORE THAN HALF THE DAYS
2. NOT BEING ABLE TO STOP OR CONTROL WORRYING: NOT AT ALL
7. FEELING AFRAID AS IF SOMETHING AWFUL MIGHT HAPPEN: NOT AT ALL
GAD7 TOTAL SCORE: 4
1. FEELING NERVOUS, ANXIOUS, OR ON EDGE: SEVERAL DAYS
3. WORRYING TOO MUCH ABOUT DIFFERENT THINGS: SEVERAL DAYS
5. BEING SO RESTLESS THAT IT IS HARD TO SIT STILL: NOT AT ALL
IF YOU CHECKED OFF ANY PROBLEMS ON THIS QUESTIONNAIRE, HOW DIFFICULT HAVE THESE PROBLEMS MADE IT FOR YOU TO DO YOUR WORK, TAKE CARE OF THINGS AT HOME, OR GET ALONG WITH OTHER PEOPLE: SOMEWHAT DIFFICULT

## 2020-08-11 ASSESSMENT — PATIENT HEALTH QUESTIONNAIRE - PHQ9
5. POOR APPETITE OR OVEREATING: NOT AT ALL
SUM OF ALL RESPONSES TO PHQ QUESTIONS 1-9: 3

## 2020-08-11 NOTE — PATIENT INSTRUCTIONS
Preventive Health Recommendations  Male Ages 50 - 64    Yearly exam:             See your health care provider every year in order to  o   Review health changes.   o   Discuss preventive care.    o   Review your medicines if your doctor has prescribed any.     Have a cholesterol test every 5 years, or more frequently if you are at risk for high cholesterol/heart disease.     Have a diabetes test (fasting glucose) every three years. If you are at risk for diabetes, you should have this test more often.     Have a colonoscopy at age 50, or have a yearly FIT test (stool test). These exams will check for colon cancer.      Talk with your health care provider about whether or not a prostate cancer screening test (PSA) is right for you.    You should be tested each year for STDs (sexually transmitted diseases), if you re at risk.     Shots: Get a flu shot each year. Get a tetanus shot every 10 years.     Nutrition:    Eat at least 5 servings of fruits and vegetables daily.     Eat whole-grain bread, whole-wheat pasta and brown rice instead of white grains and rice.     Get adequate Calcium and Vitamin D.     Lifestyle    Exercise for at least 150 minutes a week (30 minutes a day, 5 days a week). This will help you control your weight and prevent disease.     Limit alcohol to one drink per day.     No smoking.     Wear sunscreen to prevent skin cancer.     See your dentist every six months for an exam and cleaning.     See your eye doctor every 1 to 2 years.    1. Refills given  2. Labs today  3. Labs in 1 year

## 2020-08-11 NOTE — PROGRESS NOTES
3  SUBJECTIVE:   CC: Amarjit Malik is an 50 year old male who presents for preventive health visit.     Healthy Habits:  Do you get at least three servings of calcium containing foods daily (dairy, green leafy vegetables, etc.)? yes  Amount of exercise or daily activities, outside of work: none  Problems taking medications regularly No  Medication side effects: No  Have you had an eye exam in the past two years? yes  Do you see a dentist twice per year? yes  Do you have sleep apnea, excessive snoring or daytime drowsiness?yes      Hyperlipidemia Follow-Up    Are you regularly taking any medication or supplement to lower your cholesterol?   Yes- rosuvastatin 10 mg  Are you having muscle aches or other side effects that you think could be caused by your cholesterol lowering medication?  No    Hypertension Follow-up    Do you check your blood pressure regularly outside of the clinic? Yes   Are you following a low salt diet? Yes  Are your blood pressures ever more than 140 on the top number (systolic) OR more   than 90 on the bottom number (diastolic), for example 140/90? No     Wonders about checking for low T      BP Readings from Last 6 Encounters:   08/11/20 122/84   12/05/19 112/80   12/05/19 (!) 144/101   06/26/19 128/84   02/07/19 (!) 141/94   07/31/18 (!) 124/92         Anxiety Follow-Up  How are you doing with your anxiety since your last visit? No change  Are you having other symptoms that might be associated with anxiety? No  Have you had a significant life event? No   Are you feeling depressed? No  Do you have any concerns with your use of alcohol or other drugs? No    Social History     Tobacco Use     Smoking status: Never Smoker     Smokeless tobacco: Never Used   Substance Use Topics     Alcohol use: No     Comment: rare     Drug use: No     ERIC-7 SCORE 6/7/2018 7/31/2018 7/13/2020   Total Score - - -   Total Score - - 1 (minimal anxiety)   Total Score 14 4 1     PHQ 6/7/2018 7/31/2018 7/13/2020    PHQ-9 Total Score 6 3 2   Q9: Thoughts of better off dead/self-harm past 2 weeks Not at all Not at all Not at all     Last PHQ-9 7/13/2020   1.  Little interest or pleasure in doing things 1   2.  Feeling down, depressed, or hopeless 0   3.  Trouble falling or staying asleep, or sleeping too much 0   4.  Feeling tired or having little energy 1   5.  Poor appetite or overeating 0   6.  Feeling bad about yourself 0   7.  Trouble concentrating 0   8.  Moving slowly or restless 0   Q9: Thoughts of better off dead/self-harm past 2 weeks 0   PHQ-9 Total Score 2   Difficulty at work, home, or with people -     ERIC-7  7/13/2020   1. Feeling nervous, anxious, or on edge 0   2. Not being able to stop or control worrying 0   3. Worrying too much about different things 0   4. Trouble relaxing 1   5. Being so restless that it is hard to sit still 0   6. Becoming easily annoyed or irritable 0   7. Feeling afraid, as if something awful might happen 0   ERIC-7 Total Score 1   If you checked any problems, how difficult have they made it for you to do your work, take care of things at home, or get along with other people? -         Today's PHQ-2 Score: 0  PHQ-2 ( 1999 Pf[employer]) 8/11/2020 6/26/2019   Q1: Little interest or pleasure in doing things 0 0   Q2: Feeling down, depressed or hopeless 0 0   PHQ-2 Score 0 0       Abuse: Current or Past(Physical, Sexual or Emotional)- No  Do you feel safe in your environment? Yes        Social History     Tobacco Use     Smoking status: Never Smoker     Smokeless tobacco: Never Used   Substance Use Topics     Alcohol use: No     Comment: rare     If you drink alcohol do you typically have >3 drinks per day or >7 drinks per week? No                      Last PSA:   PSA   Date Value Ref Range Status   06/26/2019 0.56 0 - 4 ug/L Final     Comment:     Assay Method:  Chemiluminescence using Siemens Vista analyzer       Reviewed orders with patient. Reviewed health maintenance and updated orders  accordingly - Yes  Current Outpatient Medications   Medication Sig Dispense Refill     ciclopirox (PENLAC) 8 % external solution Apply topically daily 6 mL 1     diclofenac (VOLTAREN) 50 MG EC tablet Take 1 tablet (50 mg) by mouth 2 times daily as needed for moderate pain 60 tablet 11     hydrOXYzine (ATARAX) 25 MG tablet Take 25-50 mg up to three times daily as needed and  mg at night for sleep and anxiety. 90 tablet 3     losartan (COZAAR) 100 MG tablet Take 1 tablet (100 mg) by mouth daily 90 tablet 3     ORDER FOR DME Auto-CPAP:  Max 15 cm H2O  Min 8 cm H2O    Continuous    Lifetime need and heated humidity.    1 each 0     pantoprazole (PROTONIX) 40 MG EC tablet Take 1 tablet (40 mg) by mouth daily 90 tablet 3     PARoxetine (PAXIL) 10 MG tablet Take 1 tablet (10 mg) by mouth every morning In addition to one 40mg tablets for a daily total of 50mg 90 tablet 1     PARoxetine (PAXIL) 40 MG tablet Take 1 tablet (40 mg) by mouth At Bedtime 90 tablet 1     rosuvastatin (CRESTOR) 10 MG tablet Take 1 tablet (10 mg) by mouth daily 30 tablet 0       Reviewed and updated as needed this visit by clinical staff  Tobacco  Allergies  Meds  Med Hx  Surg Hx  Fam Hx  Soc Hx        Reviewed and updated as needed this visit by Provider            ROS:  CONSTITUTIONAL: NEGATIVE for fever, chills, change in weight  INTEGUMENTARY/SKIN: NEGATIVE for worrisome rashes, moles or lesions  EYES: NEGATIVE for vision changes or irritation  ENT: NEGATIVE for ear, mouth and throat problems  RESP: NEGATIVE for significant cough or SOB  CV: NEGATIVE for chest pain, palpitations or peripheral edema  GI: NEGATIVE for nausea, abdominal pain, heartburn, or change in bowel habits   male: negative for dysuria, hematuria, decreased urinary stream, erectile dysfunction, urethral discharge  MUSCULOSKELETAL: NEGATIVE for significant arthralgias or myalgia  NEURO: NEGATIVE for weakness, dizziness or paresthesias  PSYCHIATRIC: NEGATIVE for  "changes in mood or affect    OBJECTIVE:   /84   Pulse 76   Temp 97.1  F (36.2  C) (Tympanic)   Resp 16   Ht 1.93 m (6' 4\")   Wt 147 kg (324 lb)   SpO2 96%   BMI 39.44 kg/m    EXAM:  GENERAL: healthy, alert and no distress  EYES: Eyes grossly normal to inspection, PERRL and conjunctivae and sclerae normal  HENT: ear canals and TM's normal, nose and mouth without ulcers or lesions  NECK: no adenopathy, no asymmetry, masses, or scars and thyroid normal to palpation  RESP: lungs clear to auscultation - no rales, rhonchi or wheezes  CV: regular rate and rhythm, normal S1 S2, no S3 or S4, no murmur, click or rub, no peripheral edema and peripheral pulses strong  ABDOMEN: soft, nontender, no hepatosplenomegaly, no masses and bowel sounds normal  MS: no gross musculoskeletal defects noted, no edema  SKIN: no suspicious lesions or rashes  NEURO: Normal strength and tone, mentation intact and speech normal  PSYCH: mentation appears normal, affect normal/bright      ASSESSMENT/PLAN:   1. Routine general medical examination at a health care facility    2. ERIC (generalized anxiety disorder) -  - stable, refill provided  - hydrOXYzine (ATARAX) 25 MG tablet; Take 25-50 mg up to three times daily as needed and  mg at night for sleep and anxiety.  Dispense: 30 tablet; Refill: 3  - PARoxetine (PAXIL) 10 MG tablet; Take 1 tablet (10 mg) by mouth every morning In addition to one 40mg tablets for a daily total of 50mg  Dispense: 90 tablet; Refill: 3  - PARoxetine (PAXIL) 40 MG tablet; Take 1 tablet (40 mg) by mouth At Bedtime  Dispense: 90 tablet; Refill: 3  - OFFICE/OUTPT VISIT,EST,LEVL IV    3. Onychomycosis of great toe  - stable, refill provided  - ciclopirox (PENLAC) 8 % external solution; Apply topically daily  Dispense: 6 mL; Refill: 1  - OFFICE/OUTPT VISIT,EST,LEVL IV    4. Essential hypertension  - stable, refill provided  - BASIC METABOLIC PANEL  - losartan (COZAAR) 100 MG tablet; Take 1 tablet (100 mg) by " "mouth daily  Dispense: 90 tablet; Refill: 3  - **Basic metabolic panel FUTURE 1yr; Future  - OFFICE/OUTPT VISIT,EST,LEVL IV    5. Hyperlipidemia LDL goal <130 -  - stable, refill provided  - Lipid panel reflex to direct LDL Fasting  - losartan (COZAAR) 100 MG tablet; Take 1 tablet (100 mg) by mouth daily  Dispense: 90 tablet; Refill: 3  - rosuvastatin (CRESTOR) 10 MG tablet; Take 1 tablet (10 mg) by mouth daily  Dispense: 90 tablet; Refill: 3  - Lipid panel reflex to direct LDL Fasting; Future  - OFFICE/OUTPT VISIT,EST,LEVL IV    6. Gastroesophageal reflux disease without esophagitis  - stable, refill provided  - pantoprazole (PROTONIX) 40 MG EC tablet; Take 1 tablet (40 mg) by mouth daily  Dispense: 90 tablet; Refill: 3  - OFFICE/OUTPT VISIT,EST,LEVL IV    7. Screening for prostate cancer  - PSA, screen  - PSA, screen; Future  - OFFICE/OUTPT VISIT,EST,LEVL IV    8. History of fever - patient traveled to [place] for work.  Had feverish illness after returning.  Wants COVID testing  - COVID-19 Virus (Coronavirus) Antibody & Titer Reflex  - OFFICE/OUTPT VISIT,EST,LEVL IV    9. Low libido - over 1-2 years.  Requests testing  - **Testosterone Free and Total FUTURE anytime  - OFFICE/OUTPT VISIT,EST,LEVL IV    COUNSELING:  Reviewed preventive health counseling, as reflected in patient instructions    Estimated body mass index is 39.44 kg/m  as calculated from the following:    Height as of this encounter: 1.93 m (6' 4\").    Weight as of this encounter: 147 kg (324 lb).    Weight management plan: Discussed healthy diet and exercise guidelines     reports that he has never smoked. He has never used smokeless tobacco.      Counseling Resources:  ATP IV Guidelines  Pooled Cohorts Equation Calculator  FRAX Risk Assessment  ICSI Preventive Guidelines  Dietary Guidelines for Americans, 2010  USDA's MyPlate  ASA Prophylaxis  Lung CA Screening    Ashley Crawley, DO  [facility]  "

## 2020-08-12 LAB
COVID-19 SPIKE RBD ABY TITER: NORMAL
COVID-19 SPIKE RBD ABY: NEGATIVE

## 2020-08-12 ASSESSMENT — ANXIETY QUESTIONNAIRES: GAD7 TOTAL SCORE: 4

## 2020-08-13 DIAGNOSIS — E29.1 HYPOGONADISM MALE: Primary | ICD-10-CM

## 2020-08-13 LAB
SHBG SERPL-SCNC: 27 NMOL/L (ref 11–80)
TESTOST FREE SERPL-MCNC: 4.57 NG/DL (ref 4.7–24.4)
TESTOST SERPL-MCNC: 214 NG/DL (ref 240–950)

## 2020-08-13 RX ORDER — TESTOSTERONE CYPIONATE 1000 MG/10ML
50 INJECTION, SOLUTION INTRAMUSCULAR
Qty: 10 ML | Refills: 5 | Status: SHIPPED | OUTPATIENT
Start: 2020-08-13 | End: 2021-03-30

## 2020-08-13 NOTE — RESULT ENCOUNTER NOTE
Testosterone level is low.  You would benefit from testosterone treatment.  I typically order IM treatment, as transdermal (skin creams) are poorly covered under insurance. You can check if Androgel topical form is covered under insurance. The injection is given in the clinic every 2 weeks.  You can be taught how to give it at home.  We should check testosterone level in 2 months, long term between the dose.

## 2020-08-18 ENCOUNTER — MYC MEDICAL ADVICE (OUTPATIENT)
Dept: FAMILY MEDICINE | Facility: CLINIC | Age: 50
End: 2020-08-18

## 2020-08-18 DIAGNOSIS — E29.1 HYPOGONADISM MALE: Primary | ICD-10-CM

## 2020-08-18 RX ORDER — TESTOSTERONE CYPIONATE 200 MG/ML
50 INJECTION, SOLUTION INTRAMUSCULAR
Status: ACTIVE | OUTPATIENT
Start: 2020-08-18 | End: 2020-11-10

## 2020-08-18 NOTE — TELEPHONE ENCOUNTER
Pt intends to go to [facility] for testosterone injections.  The medication order was sent to [facility].    Need CAM order signed too.  I have cued this up and routed to provider for approval/sig.    Pt messaged with questions in MyChart.  Adriana Grace RN

## 2020-08-18 NOTE — TELEPHONE ENCOUNTER
Order signed.  We can certainly recheck the testosterone levels to ensure that they are truly low, prior to starting treatment.  If they remain low, recommend to continue with treatment as discussed

## 2020-08-18 NOTE — TELEPHONE ENCOUNTER
Per 8/13/20 testosterone results:    Ashley Crawley, DO   8/13/2020 11:06 AM       Discussed testosterone result w/ patient      Ashley Crawley, DO   8/13/2020 11:05 AM       Testosterone level is low.  You would benefit from testosterone treatment.  I typically order IM treatment, as transdermal (skin creams) are poorly covered under insurance. You can check if Androgel topical form is covered under insurance. The injection is given in the clinic every 2 weeks.  You can be taught how to give it at home.  We should check testosterone level in 2 months, shelter between the dose.      There is another testosterone lab in epic: Testosterone free and Total.       Patient sends [employer] message:     Would it be possible for you to put in an order for another fasting AM testosterone for draw at [facility].    From my reading its recommended to get a better picture?    Thanks for all tour help.  Thanks Amarjit     Did you intend for another order now or after he starts treatment.

## 2020-08-19 DIAGNOSIS — E29.1 HYPOGONADISM MALE: ICD-10-CM

## 2020-08-19 PROCEDURE — 84403 ASSAY OF TOTAL TESTOSTERONE: CPT | Performed by: INTERNAL MEDICINE

## 2020-08-19 PROCEDURE — 84270 ASSAY OF SEX HORMONE GLOBUL: CPT | Performed by: INTERNAL MEDICINE

## 2020-08-19 PROCEDURE — 36415 COLL VENOUS BLD VENIPUNCTURE: CPT | Performed by: INTERNAL MEDICINE

## 2020-08-21 ENCOUNTER — MYC MEDICAL ADVICE (OUTPATIENT)
Dept: FAMILY MEDICINE | Facility: CLINIC | Age: 50
End: 2020-08-21

## 2020-08-21 LAB
SHBG SERPL-SCNC: 25 NMOL/L (ref 11–80)
TESTOST FREE SERPL-MCNC: 6.47 NG/DL (ref 4.7–24.4)
TESTOST SERPL-MCNC: 284 NG/DL (ref 240–950)

## 2020-08-21 NOTE — TELEPHONE ENCOUNTER
Dr. Crawley,    Patient sends my chart message asking about  His testosterone. Princess BLACKMAN RN

## 2020-08-21 NOTE — TELEPHONE ENCOUNTER
Please forward questions about dosing to Dr. Crawley.  Janessa Moreno NP on 8/21/2020 at 9:24 AM

## 2020-08-27 ENCOUNTER — TELEPHONE (OUTPATIENT)
Dept: INTERNAL MEDICINE | Facility: CLINIC | Age: 50
End: 2020-08-27

## 2020-08-27 DIAGNOSIS — Z12.11 COLON CANCER SCREENING: Primary | ICD-10-CM

## 2020-08-27 NOTE — LETTER
.August 27, 2020        Amarjit Malik  [address] [address]          Dear Amarjit Malik, [id number]        At [facility] we care about your health and are committed to providing quality patient care, which includes staying current on preventative cancer screenings.  You can increase your chances of finding and treating cancers through regular screenings.      Our records show that you are due for the following screening(s):    Colon screen    This screen is recommended every 5-10 years, depending on your history, in order to prevent and detect colon cancer at its earliest stages.        Colon cancer is now the second leading cause of death in the [place] for both men and women and there are over 130,000 new cases and 50,000 deaths per year from colon cancer.  Colonoscopies can prevent 90-95% of these deaths.  Problem lesions can be removed before they ever become cancer. This test is not only looking for cancer, but also getting rid of pre-cancer lesions. You are usually given some sedation which makes the test very comfortable for most people.      If you do not wish to do a colonoscopy or cannot afford to do one, at this time, there is another option. It is called a FIT test or Fecal Immunochemical Occult Blood Test (take home stool sample kit).  It does not replace the colonoscopy for colorectal cancer screening, but it can detect hidden bleeding in the lower colon.  It does need to be repeated every year and if a positive result is obtained, you would be referred for a colonoscopy. The FIT test is really easy to do and does not require any diet or medication restrictions and involves only one collection sample.      We re sending you this option in case you would like to try out, the instructions are highlighted on the kit.     If you have completed either one of these tests or had a flexible sigmoidoscopy in the past five years at another facility, please have the  records sent to our clinic so that we can best coordinate your care and update your chart.     Feel welcome to call us [phone], if you have any questions or would like arrange either to do a colonoscopy instead we will be happy to order and schedule the visit for you.      Sincerely,    [facility] Quality Team/Ladi

## 2020-08-27 NOTE — TELEPHONE ENCOUNTER
Panel Management Review      Patient has the following on his problem list: None      Composite cancer screening  Chart review shows that this patient is due/due soon for the following Fecal Colorectal (FIT)  Summary:    Patient is due/failing the following:   FIT    Action needed:   Need send the fit test back    Type of outreach:    Sent letter. With Fit test    Questions for provider review:    None                                                                                                                                    Ladi Alvarado CMA (MARIO)   (aka: Suly Alvarado)       Chart routed to Care Team .

## 2020-09-02 NOTE — PROGRESS NOTES
"Amarjit Malik is a 50 year old male who is being evaluated via a billable video visit.      The patient has been notified of following:     \"This video visit will be conducted via a call between you and your physician/provider. We have found that certain health care needs can be provided without the need for an in-person physical exam.  This service lets us provide the care you need with a video conversation.  If a prescription is necessary we can send it directly to your pharmacy.  If lab work is needed we can place an order for that and you can then stop by our lab to have the test done at a later time.    Video visits are billed at different rates depending on your insurance coverage.  Please reach out to your insurance provider with any questions.    If during the course of the call the physician/provider feels a video visit is not appropriate, you will not be charged for this service.\"    Patient has given verbal consent for Video visit? Yes  How would you like to obtain your AVS? MyChart  If you are dropped from the video visit, the video invite should be resent to: Text to cell phone: [phone]  Will anyone else be joining your video visit? No      CPAP Follow-Up Visit:    Date on this visit: 9/3/2020    Amarjit Malik has a follow-up visit today to review his CPAP use for RUTHIE. He was initially seen by Edith Medrano NP at the [facility] for severe RUTHIE.     Previous Study Results:   A PSG at [facility] on 02/11/2007 revealed an AHI of 54 and RDI of 63 with O2 sat corey of 84%.  The patient was titrated to a CPAP pressure of 9 cm of water      He does not have concerns today about his sleep. He was recently found to have low testosterone. He is now on a supplement and his fatigue is a little better.     He uses SoClean and rinses with vinegar about every 2-3 weeks. He uses wipes on the pillows nightly.    PAP machine: ResMed. Pressure settings: 8-15 cm.    The compliance data " shows that the patient used the CPAP for 71% of nights for >4 hours.  The 95th% pressure is 11 cm.  The 95th% leak is 30.5 lpm.  The average nightly usage is 362 min in the last 14 days.  The average AHI is 1.6/hr.       Interface:  Mask: nasal pillows p10 mask. He gets new supplies about every 1-3 months.   Chin strap: No  Leak: Yes- occasionally, depending on how much he rolls around  Using Humidifier: Yes. It does sometimes run out depending on the humidity in the room.  Condensation in hose or mask: No     Difficulties with therapy:    [-] Snoring with CPAP:  [-] Difficulty tolerating the pressure:  [-] Epistaxis/dry nose:   [-] Nasal congestion:  [-] Dry mouth:  [-] Mouth breathing:   [-] Pain/skin breakdown: sometimes puts a moisturizer on his nares.      Improvements noted with CPAP: If he does not use it for a couple of days, he is more tired.  [+] waking up more refreshed  [+] sleeping better with less arousals  [-] nocturia improved   [+] improved energy level during the day    Weight change since sleep study: Thinks his weight has been within a 5-10 pound range in the last few years.     Bedtime: 8-10 PM. Wake time: 7-8 AM. Falls asleep in 20-30 minutes. He wakes 3-4 times per night lately because of an older dog. He denies napping or inadvertent dozing.     Past medical/surgical history, family history, social history, medications and allergies were reviewed.      Problem List:  Patient Active Problem List    Diagnosis Date Noted     Morbid obesity (H) 07/31/2018     Priority: Medium     Donor of stem cell 01/03/2014     Priority: Medium     Obstructive sleep apnea      Priority: Medium     Problem list name updated by automated process. Provider to review       Obesity (BMI 30-39.9)      Priority: Medium     Bone marrow donor 12/09/2013     Priority: Medium     Chest pain 02/17/2012     Priority: Medium     Hyperlipidemia LDL goal <130 10/31/2010     Priority: Medium     Leg cramps on crestor 20 mg, and  on lipitor       ERIC (generalized anxiety disorder) 11/13/2009     Priority: Medium     Benign essential hypertension 06/16/2007     Priority: Medium     He notes left arm typically has a lower blood pressure than the right arm, since age 20 or so.         Gastroesophageal reflux disease without esophagitis 06/16/2007     Priority: Medium     symptoms stable on protonix - last EGD shows hiatal hernia but no other findings  - continue meds.  2018 - Discussed long term risk/benefits.  Benefits > risk, patient opts to continue, failed trial off PPI       Hypersomnia with sleep apnea 01/08/2006     Priority: Medium     significant moderate  symptoms, and noted apnea/snoring on sleeping - sleep study. Wears CPAP regularly            Impression/Plan:    (G47.33) Obstructive sleep apnea  (primary encounter diagnosis)  Comment: Amarjit is doing well with CPAP. No significant concerns. He is meeting compliance and continues to notice a benefit from use.  Plan: Comprehensive DME        Continue auto CPAP 8-15 cm. A prescription was written for new supplies. We reviewed recommendations for cleaning and replacing supplies.     He will follow up with me in about 2 year(s).     23 minutes spent with patient, all of which were spent face-to-face counseling, consulting, coordinating plan of care.      Bennett Goltz, PA-C    CC: No ref. provider found          Video-Visit Details    Type of service:  Video Visit    Video Start Time: 2:12 PM  Video End Time: 2:35 PM    Originating Location (pt. Location): Home    Distant Location (provider location):  [facility] [place]     Platform used for Video Visit: [employer] Bennett Ezra Goltz, PA-C

## 2020-09-03 ENCOUNTER — VIRTUAL VISIT (OUTPATIENT)
Dept: SLEEP MEDICINE | Facility: CLINIC | Age: 50
End: 2020-09-03
Payer: COMMERCIAL

## 2020-09-03 DIAGNOSIS — G47.33 OBSTRUCTIVE SLEEP APNEA: Primary | ICD-10-CM

## 2020-09-03 PROCEDURE — 99213 OFFICE O/P EST LOW 20 MIN: CPT | Mod: 95 | Performed by: PHYSICIAN ASSISTANT

## 2020-09-30 DIAGNOSIS — Z11.59 ENCOUNTER FOR SCREENING FOR OTHER VIRAL DISEASES: Primary | ICD-10-CM

## 2020-11-16 ENCOUNTER — HEALTH MAINTENANCE LETTER (OUTPATIENT)
Age: 50
End: 2020-11-16

## 2020-12-26 DIAGNOSIS — Z11.59 ENCOUNTER FOR SCREENING FOR OTHER VIRAL DISEASES: ICD-10-CM

## 2020-12-26 LAB
SARS-COV-2 RNA SPEC QL NAA+PROBE: NORMAL
SPECIMEN SOURCE: NORMAL

## 2020-12-26 PROCEDURE — U0003 INFECTIOUS AGENT DETECTION BY NUCLEIC ACID (DNA OR RNA); SEVERE ACUTE RESPIRATORY SYNDROME CORONAVIRUS 2 (SARS-COV-2) (CORONAVIRUS DISEASE [COVID-19]), AMPLIFIED PROBE TECHNIQUE, MAKING USE OF HIGH THROUGHPUT TECHNOLOGIES AS DESCRIBED BY CMS-2020-01-R: HCPCS | Performed by: SURGERY

## 2020-12-27 LAB
LABORATORY COMMENT REPORT: NORMAL
SARS-COV-2 RNA SPEC QL NAA+PROBE: NEGATIVE
SPECIMEN SOURCE: NORMAL

## 2020-12-28 ENCOUNTER — ANESTHESIA EVENT (OUTPATIENT)
Dept: GASTROENTEROLOGY | Facility: CLINIC | Age: 50
End: 2020-12-28
Payer: COMMERCIAL

## 2020-12-28 NOTE — ANESTHESIA PREPROCEDURE EVALUATION
Anesthesia Evaluation     . Pt has had prior anesthetic.     History of anesthetic complications   - PONV        ROS/MED HX    ENT/Pulmonary:     (+)sleep apnea, uses CPAP , . .    Neurologic:  - neg neurologic ROS     Cardiovascular:     (+) hypertension----. : . . . :. .       METS/Exercise Tolerance:     Hematologic:  - neg hematologic  ROS       Musculoskeletal:  - neg musculoskeletal ROS       GI/Hepatic:     (+) GERD Asymptomatic on medication,       Renal/Genitourinary:  - ROS Renal section negative       Endo:  - neg endo ROS   (+) Obesity, .      Psychiatric:  - neg psychiatric ROS   (+) psychiatric history depression      Infectious Disease:  - neg infectious disease ROS       Malignancy:      - no malignancy   Other:    - neg other ROS                 Physical Exam  Normal systems: cardiovascular, pulmonary and dental    Airway   Mallampati: I  TM distance: >3 FB  Neck ROM: full    Dental     Cardiovascular       Pulmonary                     Anesthesia Plan      History & Physical Review  History and physical reviewed and following examination; no interval change.    ASA Status:  2 .    NPO Status:  > 6 hours    Plan for MAC Reason for MAC:  Deep or markedly invasive procedure (G8) and Procedure to face, neck, head or breast           Postoperative Care      Consents  Anesthetic plan, risks, benefits and alternatives discussed with:  Patient or representative..                            .  ANESTHESIA PREOP EVALUATION    Procedure: Procedure(s):  COLONOSCOPY  ESOPHAGOGASTRODUODENOSCOPY (EGD)    HPI: Amarjit Malik is a 43 year old male     PMHx:  Past Medical History:   Diagnosis Date     Anxiety      Gastro-oesophageal reflux disease      Obesity (BMI 30-39.9)      Obstructive sleep apnea (adult) (pediatric)      panic attacks, and anxiety  1/5/2006    stable on paxil     PONV (postoperative nausea and vomiting)      Pure hypercholesterolemia      Unspecified essential hypertension        PSHx:   Past  Surgical History:   Procedure Laterality Date     ESOPHAGOSCOPY, GASTROSCOPY, DUODENOSCOPY (EGD), COMBINED  12/17/2012    Procedure: COMBINED ESOPHAGOSCOPY, GASTROSCOPY, DUODENOSCOPY (EGD);  Gastroscopy  ;  Surgeon: Luc Smith MD;  Location: [facility]     HERNIA REPAIR       PROCURE BONE MARROW  1/3/2014    Procedure: PROCURE BONE MARROW;  Bone Marrow [place] Donor;  Surgeon: Chaz Negro MD;  Location: [facility]     SURGICAL HISTORY OF -       excision of bone tumor     SURGICAL HISTORY OF -   12/00    vasectomy       ROS:  CV: negative except as noted in HPI/PMH  Pulm: negative except as noted in HPI/PMH  GI: negative except as noted in HPI/PMH  : negative except as noted in HPI/PMH  Renal: negative except as noted in HPI/PMH  Endo: negative except as noted in HPI/PMH  Heme:negative except as noted in HPI/PMH  CNS: negative except as noted in HPI/PMH  Psych: negative except as noted in HPI/PMH  MSK: negative except as noted in HPI/PMH    Past Anes Hx: No personal or family h/o anesthesia problems.    Soc Hx:   Social History     Tobacco Use     Smoking status: Never Smoker     Smokeless tobacco: Never Used   Substance Use Topics     Alcohol use: No     Comment: rare       Allergies:   Allergies   Allergen Reactions     Ranitidine      Other reaction(s): Dizziness     Zantac      flushing       Meds:   (Not in a hospital admission)      Current Outpatient Medications   Medication Sig Dispense Refill     ciclopirox (PENLAC) 8 % external solution Apply topically daily 6 mL 1     diclofenac (VOLTAREN) 50 MG EC tablet Take 1 tablet (50 mg) by mouth 2 times daily as needed for moderate pain 60 tablet 11     hydrOXYzine (ATARAX) 25 MG tablet Take 25-50 mg up to three times daily as needed and  mg at night for sleep and anxiety. 30 tablet 3     losartan (COZAAR) 100 MG tablet Take 1 tablet (100 mg) by mouth daily 90 tablet 3     ORDER FOR DME Auto-CPAP:  Max 15 cm H2O  Min 8 cm  H2O    Continuous    Lifetime need and heated humidity.    1 each 0     pantoprazole (PROTONIX) 40 MG EC tablet Take 1 tablet (40 mg) by mouth daily 90 tablet 3     PARoxetine (PAXIL) 10 MG tablet Take 1 tablet (10 mg) by mouth every morning In addition to one 40mg tablets for a daily total of 50mg 90 tablet 3     PARoxetine (PAXIL) 40 MG tablet Take 1 tablet (40 mg) by mouth At Bedtime 90 tablet 3     rosuvastatin (CRESTOR) 10 MG tablet Take 1 tablet (10 mg) by mouth daily 90 tablet 3     testosterone cypionate (DEPOTESTOSTERONE) 100 MG/ML injection Inject 0.5 mLs (50 mg) into the muscle every 14 days 10 mL 5       Physical Exam:  VS: T Data Unavailable, P Data Unavailable, BP Data Unavailable, R Data Unavailable, SpO2  , Weight   Wt Readings from Last 2 Encounters:   08/11/20 147 kg (324 lb)   12/05/19 (!) 151 kg (333 lb)       Airway:    - MP   - FROM   - Adequate mouth opening    - TMD >3FB  Dentition: intact  Heart: RRR, no m/r/g  Lungs: CTAB      NPO Status: > 8 hours      Labs:  UPT:   No results found for this basename: HCGQUANT       BMP:  Recent Labs   Lab Test 01/02/14 0933        POTASSIUM 4.1    CHLORIDE 105    CO2 29    BUN 17    CR 0.89    GLC 90    RAH 9.1     CBC:   Recent Labs   Lab Test 01/02/14 0933    WBC 6.9    RBC 5.09    HGB 14.8    HCT 43.5    MCV 86    MCH 29.1    MCHC 34.0    RDW 13.4         Coags:  Recent Labs   Lab Test 12/09/13 1000    INR 1.03    PTT 28    FIBR --         Ozzie Villalba MD    1/3/2014  8:23 AM    Anesthesia Plan      History & Physical Review  History and physical reviewed and following examination; no interval change.    ASA Status:  2 .    NPO Status:  > 6 hours    Plan for MAC Reason for MAC:  Deep or markedly invasive procedure (G8) and Procedure to face, neck, head or breast           Postoperative Care      Consents  Anesthetic plan, risks, benefits and alternatives discussed with:  Patient or representative..

## 2020-12-29 ENCOUNTER — ANESTHESIA (OUTPATIENT)
Dept: GASTROENTEROLOGY | Facility: CLINIC | Age: 50
End: 2020-12-29
Payer: COMMERCIAL

## 2020-12-29 ENCOUNTER — HOSPITAL ENCOUNTER (OUTPATIENT)
Facility: CLINIC | Age: 50
Discharge: HOME | End: 2020-12-29
Attending: SURGERY | Admitting: SURGERY
Payer: COMMERCIAL

## 2020-12-29 VITALS
HEART RATE: 69 BPM | OXYGEN SATURATION: 94 % | SYSTOLIC BLOOD PRESSURE: 114 MMHG | TEMPERATURE: 98.1 F | RESPIRATION RATE: 18 BRPM | BODY MASS INDEX: 39.44 KG/M2 | DIASTOLIC BLOOD PRESSURE: 83 MMHG | HEIGHT: 76 IN

## 2020-12-29 LAB
COLONOSCOPY: NORMAL
UPPER GI ENDOSCOPY: NORMAL

## 2020-12-29 PROCEDURE — 88305 TISSUE EXAM BY PATHOLOGIST: CPT | Mod: 26 | Performed by: PATHOLOGY

## 2020-12-29 PROCEDURE — G0121 COLON CA SCRN NOT HI RSK IND: HCPCS | Performed by: SURGERY

## 2020-12-29 PROCEDURE — 45378 DIAGNOSTIC COLONOSCOPY: CPT | Performed by: SURGERY

## 2020-12-29 PROCEDURE — 370N000001 HC ANESTHESIA TECHNICAL FEE, 1ST 30 MIN: Performed by: SURGERY

## 2020-12-29 PROCEDURE — 250N000009 HC RX 250: Performed by: SURGERY

## 2020-12-29 PROCEDURE — 88305 TISSUE EXAM BY PATHOLOGIST: CPT | Mod: TC | Performed by: SURGERY

## 2020-12-29 PROCEDURE — 250N000009 HC RX 250: Performed by: NURSE ANESTHETIST, CERTIFIED REGISTERED

## 2020-12-29 PROCEDURE — 43239 EGD BIOPSY SINGLE/MULTIPLE: CPT | Performed by: SURGERY

## 2020-12-29 PROCEDURE — 250N000011 HC RX IP 250 OP 636: Performed by: NURSE ANESTHETIST, CERTIFIED REGISTERED

## 2020-12-29 PROCEDURE — 370N000002 HC ANESTHESIA TECHNICAL FEE, EACH ADDTL 15 MIN: Performed by: SURGERY

## 2020-12-29 PROCEDURE — 43239 EGD BIOPSY SINGLE/MULTIPLE: CPT | Mod: 51 | Performed by: SURGERY

## 2020-12-29 PROCEDURE — 258N000003 HC RX IP 258 OP 636: Performed by: SURGERY

## 2020-12-29 RX ORDER — ONDANSETRON 2 MG/ML
4 INJECTION INTRAMUSCULAR; INTRAVENOUS
Status: DISCONTINUED | OUTPATIENT
Start: 2020-12-29 | End: 2020-12-29 | Stop reason: HOSPADM

## 2020-12-29 RX ORDER — SODIUM CHLORIDE, SODIUM LACTATE, POTASSIUM CHLORIDE, CALCIUM CHLORIDE 600; 310; 30; 20 MG/100ML; MG/100ML; MG/100ML; MG/100ML
INJECTION, SOLUTION INTRAVENOUS CONTINUOUS
Status: DISCONTINUED | OUTPATIENT
Start: 2020-12-29 | End: 2020-12-29 | Stop reason: HOSPADM

## 2020-12-29 RX ORDER — PROPOFOL 10 MG/ML
INJECTION, EMULSION INTRAVENOUS CONTINUOUS PRN
Status: DISCONTINUED | OUTPATIENT
Start: 2020-12-29 | End: 2020-12-29

## 2020-12-29 RX ORDER — LIDOCAINE 40 MG/G
CREAM TOPICAL
Status: DISCONTINUED | OUTPATIENT
Start: 2020-12-29 | End: 2020-12-29 | Stop reason: HOSPADM

## 2020-12-29 RX ORDER — LIDOCAINE HYDROCHLORIDE 10 MG/ML
INJECTION, SOLUTION INFILTRATION; PERINEURAL PRN
Status: DISCONTINUED | OUTPATIENT
Start: 2020-12-29 | End: 2020-12-29

## 2020-12-29 RX ORDER — PROPOFOL 10 MG/ML
INJECTION, EMULSION INTRAVENOUS PRN
Status: DISCONTINUED | OUTPATIENT
Start: 2020-12-29 | End: 2020-12-29

## 2020-12-29 RX ORDER — GLYCOPYRROLATE 0.2 MG/ML
INJECTION, SOLUTION INTRAMUSCULAR; INTRAVENOUS PRN
Status: DISCONTINUED | OUTPATIENT
Start: 2020-12-29 | End: 2020-12-29

## 2020-12-29 RX ADMIN — LIDOCAINE HYDROCHLORIDE 0.1 ML: 10 INJECTION, SOLUTION EPIDURAL; INFILTRATION; INTRACAUDAL; PERINEURAL at 09:16

## 2020-12-29 RX ADMIN — GLYCOPYRROLATE 0.2 MG: 0.2 INJECTION, SOLUTION INTRAMUSCULAR; INTRAVENOUS at 09:49

## 2020-12-29 RX ADMIN — PROPOFOL 150 MCG/KG/MIN: 10 INJECTION, EMULSION INTRAVENOUS at 09:49

## 2020-12-29 RX ADMIN — SODIUM CHLORIDE, POTASSIUM CHLORIDE, SODIUM LACTATE AND CALCIUM CHLORIDE 1000 ML: 600; 310; 30; 20 INJECTION, SOLUTION INTRAVENOUS at 09:16

## 2020-12-29 RX ADMIN — PROPOFOL 50 MG: 10 INJECTION, EMULSION INTRAVENOUS at 09:49

## 2020-12-29 RX ADMIN — TOPICAL ANESTHETIC 1 SPRAY: 200 SPRAY DENTAL; PERIODONTAL at 09:49

## 2020-12-29 RX ADMIN — LIDOCAINE HYDROCHLORIDE 100 MG: 10 INJECTION, SOLUTION INFILTRATION; PERINEURAL at 09:49

## 2020-12-29 NOTE — ANESTHESIA CARE TRANSFER NOTE
Patient: Amarjit Malik    Procedure(s):  COLONOSCOPY  ESOPHAGOGASTRODUODENOSCOPY, WITH BIOPSY    Diagnosis: Screen for colon cancer [Z12.11]  Gastroesophageal reflux disease, unspecified whether esophagitis present [K21.9]  Diagnosis Additional Information: No value filed.    Anesthesia Type:   MAC     Note:  Airway :Room Air  Patient transferred to:Phase II  Handoff Report: Identifed the Patient, Identified the Reponsible Provider, Reviewed the pertinent medical history, Discussed the surgical course, Reviewed Intra-OP anesthesia mangement and issues during anesthesia, Set expectations for post-procedure period and Allowed opportunity for questions and acknowledgement of understanding      Vitals: (Last set prior to Anesthesia Care Transfer)    CRNA VITALS  12/29/2020 0959 - 12/29/2020 1029      12/29/2020             Pulse:  73    SpO2:  95 %                Electronically Signed By: Laya Brothers CRNA, ELICEO PONCE  December 29, 2020  10:29 AM

## 2020-12-29 NOTE — H&P
50 year old year old male here for colonoscopy for screening, and an EGD to evaluate symptoms of GERD.    Patient Active Problem List   Diagnosis     Hypersomnia with sleep apnea     Benign essential hypertension     Gastroesophageal reflux disease without esophagitis     ERIC (generalized anxiety disorder)     Hyperlipidemia LDL goal <130     Chest pain     Bone marrow donor     Obstructive sleep apnea     Obesity (BMI 30-39.9)     Donor of stem cell     Morbid obesity (H)       Past Medical History:   Diagnosis Date     Anxiety      Gastro-oesophageal reflux disease      Obesity (BMI 30-39.9)      Obstructive sleep apnea (adult) (pediatric)      panic attacks, and anxiety  1/5/2006    stable on paxil     PONV (postoperative nausea and vomiting)      Pure hypercholesterolemia      Unspecified essential hypertension        Past Surgical History:   Procedure Laterality Date     ESOPHAGOSCOPY, GASTROSCOPY, DUODENOSCOPY (EGD), COMBINED  12/17/2012    Procedure: COMBINED ESOPHAGOSCOPY, GASTROSCOPY, DUODENOSCOPY (EGD);  Gastroscopy  ;  Surgeon: Luc Smith MD;  Location: [facility]     HERNIA REPAIR       PROCURE BONE MARROW  1/3/2014    Procedure: PROCURE BONE MARROW;  Bone Marrow [place] Donor;  Surgeon: Chaz Negro MD;  Location:  OR     SURGICAL HISTORY OF -       excision of bone tumor     SURGICAL HISTORY OF -   12/00    vasectomy       @[facility]@    No current outpatient medications on file.       Allergies   Allergen Reactions     Ranitidine      Other reaction(s): Dizziness     Zantac      flushing       Pt reports that he has never smoked. He has never used smokeless tobacco. He reports that he does not drink alcohol or use drugs.    Exam:  There were no vitals taken for this visit.    Awake, Alert OX3  Lungs - CTA bilaterally  CV - RRR, no murmurs, distal pulses intact  Abd - soft, non-distended, non-tender, +BS  Extr - No cyanosis or edema    A/P 50 year old year old male in need of colonoscopy  for screening, and an EGD to evaluate symptoms of GERD.  Risks, benefits, alternatives, and complications were discussed including the possibility of perforation and the patient agreed to proceed    Frank Echeverria MD

## 2020-12-29 NOTE — ANESTHESIA POSTPROCEDURE EVALUATION
Patient: Amarjit Malik    Procedure(s):  COLONOSCOPY  ESOPHAGOGASTRODUODENOSCOPY, WITH BIOPSY    Diagnosis:Screen for colon cancer [Z12.11]  Gastroesophageal reflux disease, unspecified whether esophagitis present [K21.9]  Diagnosis Additional Information: No value filed.    Anesthesia Type:  MAC    Note:  Anesthesia Post Evaluation    Patient location during evaluation: Bedside  Patient participation: Able to fully participate in evaluation  Level of consciousness: awake and alert  Pain management: adequate  Airway patency: patent  Cardiovascular status: acceptable  Respiratory status: acceptable  Hydration status: acceptable  PONV: none     Anesthetic complications: None          Last vitals:  Vitals:    12/29/20 0812   BP: (!) (P) 146/99   Pulse: (P) 71   Resp: (P) 18   SpO2: (P) 94%         Electronically Signed By: Laya Brothers CRNA, ELICEO PONCE  December 29, 2020  10:29 AM

## 2020-12-29 NOTE — BRIEF OP NOTE
[facility]    Brief Operative Note    Pre-operative diagnosis: Screen for colon cancer [Z12.11]  Gastroesophageal reflux disease, unspecified whether esophagitis present [K21.9]   Post-operative diagnosis benign gastric polyps, normal colon     Procedure: Procedure(s):  COLONOSCOPY  ESOPHAGOGASTRODUODENOSCOPY, WITH BIOPSY   Surgeon(s): Surgeon(s) and Role:     * Frank Echeverria MD - Primary   Estimated blood loss: * No values recorded between 12/29/2020  9:53 AM and 12/29/2020 10:22 AM *    Specimens: ID Type Source Tests Collected by Time Destination   A :  Biopsy Stomach, Antrum SURGICAL PATHOLOGY EXAM Frank Echeverria MD 12/29/2020  9:56 AM    B :  Polyp Stomach, Body SURGICAL PATHOLOGY EXAM Frank Echeverria MD 12/29/2020  9:59 AM       Findings: 1. Normal esophagus  2. z-line regular at 44 cm  3. Multiple benign-appearing gastric polyps - one biopsied  4. Stomach otherwise normal - biopsied for h. Pylori  5. Normal duodenum  6. Normal colon

## 2020-12-30 LAB — COPATH REPORT: NORMAL

## 2021-03-25 DIAGNOSIS — I10 BENIGN ESSENTIAL HYPERTENSION: Primary | Chronic | ICD-10-CM

## 2021-03-25 DIAGNOSIS — E29.1 HYPOGONADISM MALE: ICD-10-CM

## 2021-03-25 NOTE — TELEPHONE ENCOUNTER
Requested Prescriptions   Pending Prescriptions Disp Refills     testosterone cypionate (DEPOTESTOSTERONE) 200 MG/ML injection [Pharmacy Med Name: TESTOSTERONE CYPIONATE 200 SOLN]  5     Sig: INJECT 0.25ML INTRAMUSCULARLY EVERY 14 DAYS       Androgen Agents Failed - 3/25/2021  1:50 PM        Failed - ALT on file within past 12 mos     Recent Labs   Lab Test 06/26/19  0926   ALT 47             Failed - HCT less than 54% on file within past 12 mos     Recent Labs   Lab Test 01/03/14  1549   HCT 33.7*             Failed - Refills for this classification require provider review        Failed - Recent (6 mo) or future (30 days) visit within the authorizing provider's specialty        Failed - AST on file within past 12 mos     Recent Labs   Lab Test 06/26/19  0926   AST 23             Passed - Patient is of age 12 and older        Passed - Lipid panel on file in past 12 mos     Recent Labs   Lab Test 08/11/20  1211 01/21/15  0724 01/21/15  0724   CHOL 180   < > 236*   TRIG 133   < > 210*   HDL 47   < > 39*   *   < > 155*   NHDL 133*   < >  --    VLDL  --   --  42*   CHOLHDLRATIO  --   --  6.1*    < > = values in this interval not displayed.               Passed - Medication is active on med list        Passed - Serum Testosterone on file within past 12 mos     Recent Labs   Lab Test 08/19/20  0858   TESTOSTTOTAL 284             Passed - Serum PSA on file within past 12 mos     Lab Results   Component Value Date    PSA 0.53 08/11/2020             Passed - Blood pressure under 140/90 in past 6 months     BP Readings from Last 3 Encounters:   12/29/20 114/83   08/11/20 122/84   12/05/19 112/80                 Passed - Patient is not pregnant        Passed - No positive pregnancy test on file within past 12 mos

## 2021-03-30 RX ORDER — TESTOSTERONE CYPIONATE 200 MG/ML
INJECTION, SOLUTION INTRAMUSCULAR
Qty: 1 ML | Refills: 5 | Status: SHIPPED | OUTPATIENT
Start: 2021-03-30 | End: 2021-05-18

## 2021-05-17 ENCOUNTER — MYC MEDICAL ADVICE (OUTPATIENT)
Dept: FAMILY MEDICINE | Facility: CLINIC | Age: 51
End: 2021-05-17

## 2021-05-17 DIAGNOSIS — E29.1 HYPOGONADISM MALE: ICD-10-CM

## 2021-05-18 RX ORDER — TESTOSTERONE CYPIONATE 200 MG/ML
0.25 INJECTION, SOLUTION INTRAMUSCULAR
Qty: 3 ML | Refills: 5 | Status: SHIPPED | OUTPATIENT
Start: 2021-05-18 | End: 2022-01-25

## 2021-06-06 NOTE — TELEPHONE ENCOUNTER
Vomiting, diarrhea, chills, fatigue, cough    No fever    Exposed to Influenza A on Monday    Started having symptoms Wednesday of influenza but those are starting to subside.     Now is mostly struggling with GI. Vomited 7 times so far in last hour. Also has diarrhea.     Triaged to a disposition of Home Care. Patient is agreeable     Reason for Disposition    [1] Probable influenza (fever) with no complications AND [2] NOT HIGH RISK    [1] SEVERE vomiting (e.g., 6 or more times/day, vomits everything) BUT [2] hydrated    Protocols used: INFLUENZA - SEASONAL-A-, VOMITING-A-AH    Alberta Garcia RN Triage Nurse Advisor

## 2021-07-02 ENCOUNTER — OFFICE VISIT (OUTPATIENT)
Dept: FAMILY MEDICINE | Facility: CLINIC | Age: 51
End: 2021-07-02
Payer: COMMERCIAL

## 2021-07-02 ENCOUNTER — HOSPITAL ENCOUNTER (OUTPATIENT)
Dept: CT IMAGING | Facility: CLINIC | Age: 51
Discharge: HOME | End: 2021-07-02
Attending: NURSE PRACTITIONER | Admitting: NURSE PRACTITIONER
Payer: COMMERCIAL

## 2021-07-02 ENCOUNTER — TELEPHONE (OUTPATIENT)
Dept: FAMILY MEDICINE | Facility: CLINIC | Age: 51
End: 2021-07-02

## 2021-07-02 VITALS
DIASTOLIC BLOOD PRESSURE: 78 MMHG | BODY MASS INDEX: 40.53 KG/M2 | SYSTOLIC BLOOD PRESSURE: 130 MMHG | WEIGHT: 315 LBS | OXYGEN SATURATION: 94 % | TEMPERATURE: 97.8 F | HEART RATE: 80 BPM

## 2021-07-02 DIAGNOSIS — R51.9 INTRACTABLE HEADACHE, UNSPECIFIED CHRONICITY PATTERN, UNSPECIFIED HEADACHE TYPE: Primary | ICD-10-CM

## 2021-07-02 LAB
ALBUMIN SERPL-MCNC: 3.9 G/DL (ref 3.4–5)
ALP SERPL-CCNC: 78 U/L (ref 40–150)
ALT SERPL W P-5'-P-CCNC: 52 U/L (ref 0–70)
ANION GAP SERPL CALCULATED.3IONS-SCNC: 8 MMOL/L (ref 3–14)
AST SERPL W P-5'-P-CCNC: 26 U/L (ref 0–45)
BASOPHILS # BLD AUTO: 0 10E9/L (ref 0–0.2)
BASOPHILS NFR BLD AUTO: 0.4 %
BILIRUB SERPL-MCNC: 0.8 MG/DL (ref 0.2–1.3)
BUN SERPL-MCNC: 21 MG/DL (ref 7–30)
CALCIUM SERPL-MCNC: 9 MG/DL (ref 8.5–10.1)
CHLORIDE SERPL-SCNC: 108 MMOL/L (ref 94–109)
CO2 SERPL-SCNC: 24 MMOL/L (ref 20–32)
CREAT SERPL-MCNC: 0.97 MG/DL (ref 0.66–1.25)
DIFFERENTIAL METHOD BLD: NORMAL
EOSINOPHIL # BLD AUTO: 0.2 10E9/L (ref 0–0.7)
EOSINOPHIL NFR BLD AUTO: 3 %
ERYTHROCYTE [DISTWIDTH] IN BLOOD BY AUTOMATED COUNT: 13.2 % (ref 10–15)
GFR SERPL CREATININE-BSD FRML MDRD: >90 ML/MIN/{1.73_M2}
GLUCOSE SERPL-MCNC: 111 MG/DL (ref 70–99)
HCT VFR BLD AUTO: 46.2 % (ref 40–53)
HGB BLD-MCNC: 15.8 G/DL (ref 13.3–17.7)
LYMPHOCYTES # BLD AUTO: 2.1 10E9/L (ref 0.8–5.3)
LYMPHOCYTES NFR BLD AUTO: 29.8 %
MCH RBC QN AUTO: 29.4 PG (ref 26.5–33)
MCHC RBC AUTO-ENTMCNC: 34.2 G/DL (ref 31.5–36.5)
MCV RBC AUTO: 86 FL (ref 78–100)
MONOCYTES # BLD AUTO: 1 10E9/L (ref 0–1.3)
MONOCYTES NFR BLD AUTO: 14.7 %
NEUTROPHILS # BLD AUTO: 3.6 10E9/L (ref 1.6–8.3)
NEUTROPHILS NFR BLD AUTO: 52.1 %
PLATELET # BLD AUTO: 238 10E9/L (ref 150–450)
POTASSIUM SERPL-SCNC: 4.2 MMOL/L (ref 3.4–5.3)
PROT SERPL-MCNC: 7.9 G/DL (ref 6.8–8.8)
RBC # BLD AUTO: 5.38 10E12/L (ref 4.4–5.9)
SODIUM SERPL-SCNC: 140 MMOL/L (ref 133–144)
WBC # BLD AUTO: 6.9 10E9/L (ref 4–11)

## 2021-07-02 PROCEDURE — 85025 COMPLETE CBC W/AUTO DIFF WBC: CPT | Performed by: NURSE PRACTITIONER

## 2021-07-02 PROCEDURE — 99214 OFFICE O/P EST MOD 30 MIN: CPT | Performed by: NURSE PRACTITIONER

## 2021-07-02 PROCEDURE — 80053 COMPREHEN METABOLIC PANEL: CPT | Performed by: NURSE PRACTITIONER

## 2021-07-02 PROCEDURE — 70450 CT HEAD/BRAIN W/O DYE: CPT

## 2021-07-02 PROCEDURE — 36415 COLL VENOUS BLD VENIPUNCTURE: CPT | Performed by: NURSE PRACTITIONER

## 2021-07-02 PROCEDURE — 86618 LYME DISEASE ANTIBODY: CPT | Performed by: NURSE PRACTITIONER

## 2021-07-02 NOTE — PATIENT INSTRUCTIONS
Normal blood counts and head CT.  I will let you know when CMP and lyme screen come back  Try the Zanaflex and ibuprofen. Let me know if not improving.

## 2021-07-02 NOTE — TELEPHONE ENCOUNTER
Sanam is calling to report that patient CT scan result came back normal and results are in the chart ready to view.    Bre Dorsey on 7/2/2021 at 3:18 PM

## 2021-07-02 NOTE — TELEPHONE ENCOUNTER
"Noted, and per provider visit and result notes, normal results were already discussed with patient in clinic. Closing encounter.     Clementina Rose RN  [facility]      \"Intractable headache, unspecified chronicity pattern, unspecified headache type  Given no headache history and worst headache, stat CT of the head was obtained to r/o ICH. Negative.\"        CT Head w/o Contrast: Result Notes   Melissa Quijano, ELICEO CNP   7/2/2021  3:29 PM CDT      Discussed during visit.          "

## 2021-07-02 NOTE — PROGRESS NOTES
"    Assessment & Plan     Intractable headache, unspecified chronicity pattern, unspecified headache type  Given no headache history and worst headache, stat CT of the head was obtained to r/o ICH. Negative. CBC normal. Has had some nausea, mild dizziness. CMP is pending, recent hunting trip, will check Lyme screen although no fever, rash. Does have some mild neck pain, no meningeal signs. Suspect tension type headache. Can continue ibuprofen, try tizanidine to see if this is helpful. If not improving, follow up with PCP.  - CT Head w/o Contrast  - CBC with platelets differential  - Comprehensive metabolic panel  - Lyme Disease Arlet with reflex to WB Serum  - tiZANidine (ZANAFLEX) 4 MG tablet; Take 1 tablet (4 mg) by mouth 3 times daily       BMI:   Estimated body mass index is 40.53 kg/m  as calculated from the following:    Height as of 12/29/20: 1.93 m (6' 4\").    Weight as of this encounter: 151 kg (333 lb).       Patient Instructions   Normal blood counts and head CT.  I will let you know when CMP and lyme screen come back  Try the Zanaflex and ibuprofen. Let me know if not improving.      Return in about 1 week (around 7/9/2021) for worsening or continued symptoms.    ELICEO Hughes CNP  [facility]    Corrina Ocasio is a 50 year old who presents for the following health issues     HPI     Headache  Onset/Duration: 3 days   Description  Location: bilateral in the occipital area   Character: throbbing pain  Frequency:  Frequent over the last few days   Duration: Until pt goes to sleep   Wake with headaches: YES  Able to do daily activities when headache present: YES  Intensity:  moderate  Progression of Symptoms:  same  Accompanying signs and symptoms:  Stiff neck: YES  Neck or upper back pain: no  Sinus or URI symptoms no   Fever: Had sweats, chills, and tachycardia last night   Nausea or vomiting: YES- nausea   Dizziness: YES- unstable this morning   Numbness/tingling: " no  Weakness: no  Visual changes: none  History  Head trauma: no  Family history of migraines: no  Daily pain medication use: YES- diclofenac   Previous tests for headaches: no  Neurologist evaluation: no  Precipitating or Alleviating factors (light/sound/sleep/caffeine): laying down makes it better   Therapies tried and outcome: Ibuprofen (Advil, Motrin)              Outcome - only effective at 800mg; helps a little bit     Above HPI reviewed. Additionally, headache has been consistent for 3 days, occipital. Worst headache, however does not get headaches. No thunderclap feature. No photophobia, no vision changes. Did have episode of dizziness in shower earlier today, mild nausea. Unsure of FMH of migraine. No URI sx, no fever. Mild neck tension. Recently returned from hunting trip.      Review of Systems   Constitutional, HEENT, cardiovascular, pulmonary, gi and gu systems are negative, except as otherwise noted.      Objective    /78   Pulse 80   Temp 97.8  F (36.6  C) (Tympanic)   Wt (!) 151 kg (333 lb)   SpO2 94%   BMI 40.53 kg/m    Body mass index is 40.53 kg/m .  Physical Exam   General Appearance:  Alert, cooperative, no distress, appears stated age. Afebrile.  Integument: Warm, dry, no rashes or lesions.  HEENT:  Head: Atraumatic, normocephalic. No cranial bone tenderness. Face nontraumatic.  Eyes: Conjunctiva clear, Lids normal. PERRL.   Nose: nares patent. No erythema of nasal mucosa. No rhinorrhea.  Neck: Supple. No Cspine tenderness.  Respiratory: No distress. Lungs clear to ausculation bilaterally. No crackles, wheezes, rhonchi or stridor.  Cardiovascular:: Regular rate, regular rhythm. No murmurs, rubs, clicks or gallops. No obvious chest wall deformities. Peripheral pulses 2+ bilaterally. No peripheral edema.  GI: Soft, nontender, normal bowel sounds. No masses, organomegaly, rigidity, or guarding.  Musculoskeletal: No swelling or erythema of extremities. Moves all extremities equally.  Back: no Tspine or Lspine tenderness.   Neurologic: Alert & oriented to person, place and time. Normal tone. PERRL. Normal speech, no dysarthria.  CN II-XII grossly intact. Motor: RUE/LUE  strength 5/5 bilaterally, elbow flexion/extension 5/5 bilaterally, shoulder elevation 5/5 bilaterally. RLE/LLE knee flexion/extension 5/5 bilaterally, ankle plantar/dorsiflexion 5/5 bilaterally. No pronator drift. Sensory: intact distally  Gait: Normal, no assistive devices. Assistance of one. Psychomotor slowing (-). Abnormal Movements (-).Rapid alternating Movements intact. Finger nose finger intact. Heel to shin normal bilaterally.  Psych: Normal mood and affect.      Results for orders placed or performed in visit on 07/02/21 (from the past 24 hour(s))   CBC with platelets differential   Result Value Ref Range    WBC 6.9 4.0 - 11.0 10e9/L    RBC Count 5.38 4.4 - 5.9 10e12/L    Hemoglobin 15.8 13.3 - 17.7 g/dL    Hematocrit 46.2 40.0 - 53.0 %    MCV 86 78 - 100 fl    MCH 29.4 26.5 - 33.0 pg    MCHC 34.2 31.5 - 36.5 g/dL    RDW 13.2 10.0 - 15.0 %    Platelet Count 238 150 - 450 10e9/L    % Neutrophils 52.1 %    % Lymphocytes 29.8 %    % Monocytes 14.7 %    % Eosinophils 3.0 %    % Basophils 0.4 %    Absolute Neutrophil 3.6 1.6 - 8.3 10e9/L    Absolute Lymphocytes 2.1 0.8 - 5.3 10e9/L    Absolute Monocytes 1.0 0.0 - 1.3 10e9/L    Absolute Eosinophils 0.2 0.0 - 0.7 10e9/L    Absolute Basophils 0.0 0.0 - 0.2 10e9/L    Diff Method Automated Method    Comprehensive metabolic panel   Result Value Ref Range    Sodium 140 133 - 144 mmol/L    Potassium 4.2 3.4 - 5.3 mmol/L    Chloride 108 94 - 109 mmol/L    Carbon Dioxide PENDING 20 - 32 mmol/L    Anion Gap PENDING 3 - 14 mmol/L    Glucose PENDING 70 - 99 mg/dL    Urea Nitrogen PENDING 7 - 30 mg/dL    Creatinine PENDING 0.66 - 1.25 mg/dL    GFR Estimate PENDING >60 mL/min/[1.73_m2]    GFR Estimate If Black PENDING >60 mL/min/[1.73_m2]    Calcium PENDING 8.5 - 10.1 mg/dL    Bilirubin  Total PENDING 0.2 - 1.3 mg/dL    Albumin PENDING 3.4 - 5.0 g/dL    Protein Total PENDING 6.8 - 8.8 g/dL    Alkaline Phosphatase PENDING 40 - 150 U/L    ALT PENDING 0 - 70 U/L    AST PENDING 0 - 45 U/L   CT Head w/o Contrast    Narrative    CT SCAN OF THE HEAD WITHOUT CONTRAST   7/2/2021 2:49 PM     HISTORY: Intractable headache, unspecified chronicity pattern,  unspecified headache type    TECHNIQUE:  Axial images of the head and coronal reformations without  IV contrast material.  Radiation dose for this scan was reduced using  automated exposure control, adjustment of the mA and/or kV according  to patient size, or iterative reconstruction technique.    COMPARISON: None.    FINDINGS:  The ventricles are normal in size, shape and configuration.   The brain parenchyma and subarachnoid spaces are normal. There is no  evidence of intracranial hemorrhage, mass, acute infarct or anomaly.     The visualized portions of the sinuses and mastoids appear normal.  There is no evidence of trauma.      Impression    IMPRESSION: Normal CT scan of the head.      SNEHAL MONTAÑO MD          SYSTEM ID:  [id number]

## 2021-07-02 NOTE — LETTER
July 6, 2021      Amarjit Malik  [address], [place], [facility] [address]        Dear ,    We are writing to inform you of your test results.    Lymes testing negative. Metabolic panel satisfactory.     Resulted Orders   CBC with platelets differential   Result Value Ref Range    WBC 6.9 4.0 - 11.0 10e9/L    RBC Count 5.38 4.4 - 5.9 10e12/L    Hemoglobin 15.8 13.3 - 17.7 g/dL    Hematocrit 46.2 40.0 - 53.0 %    MCV 86 78 - 100 fl    MCH 29.4 26.5 - 33.0 pg    MCHC 34.2 31.5 - 36.5 g/dL    RDW 13.2 10.0 - 15.0 %    Platelet Count 238 150 - 450 10e9/L    % Neutrophils 52.1 %    % Lymphocytes 29.8 %    % Monocytes 14.7 %    % Eosinophils 3.0 %    % Basophils 0.4 %    Absolute Neutrophil 3.6 1.6 - 8.3 10e9/L    Absolute Lymphocytes 2.1 0.8 - 5.3 10e9/L    Absolute Monocytes 1.0 0.0 - 1.3 10e9/L    Absolute Eosinophils 0.2 0.0 - 0.7 10e9/L    Absolute Basophils 0.0 0.0 - 0.2 10e9/L    Diff Method Automated Method    Comprehensive metabolic panel   Result Value Ref Range    Sodium 140 133 - 144 mmol/L    Potassium 4.2 3.4 - 5.3 mmol/L    Chloride 108 94 - 109 mmol/L    Carbon Dioxide 24 20 - 32 mmol/L    Anion Gap 8 3 - 14 mmol/L    Glucose 111 (H) 70 - 99 mg/dL    Urea Nitrogen 21 7 - 30 mg/dL    Creatinine 0.97 0.66 - 1.25 mg/dL    GFR Estimate >90 >60 mL/min/[1.73_m2]      Comment:      Non  GFR Calc  Starting 12/18/2018, serum creatinine based estimated GFR (eGFR) will be   calculated using the Chronic Kidney Disease Epidemiology Collaboration   (CKD-EPI) equation.      GFR Estimate If Black >90 >60 mL/min/[1.73_m2]      Comment:       GFR Calc  Starting 12/18/2018, serum creatinine based estimated GFR (eGFR) will be   calculated using the Chronic Kidney Disease Epidemiology Collaboration   (CKD-EPI) equation.      Calcium 9.0 8.5 - 10.1 mg/dL    Bilirubin Total 0.8 0.2 - 1.3 mg/dL    Albumin 3.9 3.4 - 5.0 g/dL    Protein Total 7.9 6.8 - 8.8 g/dL    Alkaline Phosphatase 78  40 - 150 U/L    ALT 52 0 - 70 U/L    AST 26 0 - 45 U/L   Lyme Disease Arlet with reflex to WB Serum   Result Value Ref Range    Lyme Disease Antibodies Serum 0.08 0.00 - 0.89      Comment:      Negative, Absence of detectable Borrelia burdorferi antibodies. A negative   result does not exclude the possibility of Borrelia burgdorferi infection. If   early Lyme disease is suspected, a second sample should be collected and   tested 2 to 4 weeks later.         If you have any questions or concerns, please call the clinic at the number listed above.       Sincerely,      ELICEO Bonilla CNP

## 2021-07-03 LAB — B BURGDOR IGG+IGM SER QL: 0.08 (ref 0–0.89)

## 2021-08-12 ENCOUNTER — MYC MEDICAL ADVICE (OUTPATIENT)
Dept: FAMILY MEDICINE | Facility: CLINIC | Age: 51
End: 2021-08-12

## 2021-08-12 DIAGNOSIS — F41.1 GAD (GENERALIZED ANXIETY DISORDER): Chronic | ICD-10-CM

## 2021-08-12 DIAGNOSIS — E78.5 HYPERLIPIDEMIA LDL GOAL <130: ICD-10-CM

## 2021-08-12 DIAGNOSIS — K21.9 GASTROESOPHAGEAL REFLUX DISEASE WITHOUT ESOPHAGITIS: ICD-10-CM

## 2021-08-12 DIAGNOSIS — M54.50 CHRONIC LOW BACK PAIN, UNSPECIFIED BACK PAIN LATERALITY, UNSPECIFIED WHETHER SCIATICA PRESENT: ICD-10-CM

## 2021-08-12 DIAGNOSIS — G89.29 CHRONIC LOW BACK PAIN, UNSPECIFIED BACK PAIN LATERALITY, UNSPECIFIED WHETHER SCIATICA PRESENT: ICD-10-CM

## 2021-08-13 RX ORDER — ROSUVASTATIN CALCIUM 10 MG/1
TABLET, COATED ORAL
Qty: 90 TABLET | Refills: 0 | Status: SHIPPED | OUTPATIENT
Start: 2021-08-13 | End: 2021-11-09

## 2021-08-13 RX ORDER — PANTOPRAZOLE SODIUM 40 MG/1
TABLET, DELAYED RELEASE ORAL
Qty: 90 TABLET | Refills: 3 | Status: SHIPPED | OUTPATIENT
Start: 2021-08-13 | End: 2022-08-08

## 2021-08-13 RX ORDER — PAROXETINE 40 MG/1
TABLET, FILM COATED ORAL
Qty: 90 TABLET | Refills: 3 | Status: SHIPPED | OUTPATIENT
Start: 2021-08-13 | End: 2022-08-08

## 2021-08-13 RX ORDER — PAROXETINE 10 MG/1
TABLET, FILM COATED ORAL
Qty: 90 TABLET | Refills: 3 | Status: SHIPPED | OUTPATIENT
Start: 2021-08-13 | End: 2022-08-08

## 2021-08-13 NOTE — TELEPHONE ENCOUNTER
Routing diclofenac and paroxetine refill requests to Provider because there is a drug interaction warning.  Thank you.  Monster Estrella RN

## 2021-08-16 DIAGNOSIS — E78.5 HYPERLIPIDEMIA LDL GOAL <130: ICD-10-CM

## 2021-08-16 DIAGNOSIS — I10 ESSENTIAL HYPERTENSION: ICD-10-CM

## 2021-08-20 RX ORDER — LOSARTAN POTASSIUM 100 MG/1
TABLET ORAL
Qty: 90 TABLET | Refills: 1 | Status: SHIPPED | OUTPATIENT
Start: 2021-08-20 | End: 2022-03-15

## 2021-08-20 NOTE — TELEPHONE ENCOUNTER
"Requested Prescriptions   Pending Prescriptions Disp Refills     losartan (COZAAR) 100 MG tablet [Pharmacy Med Name: LOSARTAN POTASSIUM 100MG TABS] 90 tablet 3     Sig: TAKE ONE TABLET BY MOUTH ONCE DAILY       Angiotensin-II Receptors Passed - 8/16/2021  3:19 PM        Passed - Last blood pressure under 140/90 in past 12 months     BP Readings from Last 3 Encounters:   07/02/21 130/78   12/29/20 114/83   08/11/20 122/84                 Passed - Recent (12 mo) or future (30 days) visit within the authorizing provider's specialty     Patient has had an office visit with the authorizing provider or a provider within the authorizing providers department within the previous 12 mos or has a future within next 30 days. See \"Patient Info\" tab in inbasket, or \"Choose Columns\" in Meds & Orders section of the refill encounter.              Passed - Medication is active on med list        Passed - Patient is age 18 or older        Passed - Normal serum creatinine on file in past 12 months     Recent Labs   Lab Test 07/02/21  1357   CR 0.97       Ok to refill medication if creatinine is low          Passed - Normal serum potassium on file in past 12 months     Recent Labs   Lab Test 07/02/21  1357   POTASSIUM 4.2                         "

## 2021-09-18 ENCOUNTER — HEALTH MAINTENANCE LETTER (OUTPATIENT)
Age: 51
End: 2021-09-18

## 2021-10-04 ENCOUNTER — MYC MEDICAL ADVICE (OUTPATIENT)
Dept: FAMILY MEDICINE | Facility: CLINIC | Age: 51
End: 2021-10-04

## 2021-10-13 DIAGNOSIS — G47.33 OBSTRUCTIVE SLEEP APNEA: Primary | ICD-10-CM

## 2021-11-07 DIAGNOSIS — E78.5 HYPERLIPIDEMIA LDL GOAL <130: ICD-10-CM

## 2021-11-09 RX ORDER — ROSUVASTATIN CALCIUM 10 MG/1
10 TABLET, COATED ORAL DAILY
Qty: 90 TABLET | Refills: 0 | Status: SHIPPED | OUTPATIENT
Start: 2021-11-09 | End: 2022-02-09

## 2021-11-09 NOTE — TELEPHONE ENCOUNTER
"Requested Prescriptions   Pending Prescriptions Disp Refills     rosuvastatin (CRESTOR) 10 MG tablet [Pharmacy Med Name: ROSUVASTATIN CALCIUM 10MG TABS] 90 tablet 0     Sig: TAKE ONE TABLET BY MOUTH ONCE DAILY       Statins Protocol Failed - 11/7/2021 10:06 AM        Failed - LDL on file in past 12 months     Recent Labs   Lab Test 08/11/20  1211   *             Passed - No abnormal creatine kinase in past 12 months     No lab results found.             Passed - Recent (12 mo) or future (30 days) visit within the authorizing provider's specialty     Patient has had an office visit with the authorizing provider or a provider within the authorizing providers department within the previous 12 mos or has a future within next 30 days. See \"Patient Info\" tab in inbasket, or \"Choose Columns\" in Meds & Orders section of the refill encounter.              Passed - Medication is active on med list        Passed - Patient is age 18 or older             "

## 2022-01-24 DIAGNOSIS — E78.5 HYPERLIPIDEMIA LDL GOAL <130: ICD-10-CM

## 2022-01-24 DIAGNOSIS — Z12.5 SCREENING FOR PROSTATE CANCER: ICD-10-CM

## 2022-01-24 DIAGNOSIS — I10 BENIGN ESSENTIAL HYPERTENSION: Primary | ICD-10-CM

## 2022-01-24 DIAGNOSIS — E29.1 HYPOGONADISM MALE: ICD-10-CM

## 2022-01-25 RX ORDER — TESTOSTERONE CYPIONATE 200 MG/ML
0.25 INJECTION, SOLUTION INTRAMUSCULAR
Qty: 3 ML | Refills: 0 | Status: SHIPPED | OUTPATIENT
Start: 2022-01-25 | End: 2022-03-28

## 2022-02-07 DIAGNOSIS — E78.5 HYPERLIPIDEMIA LDL GOAL <130: ICD-10-CM

## 2022-02-09 RX ORDER — ROSUVASTATIN CALCIUM 10 MG/1
TABLET, COATED ORAL
Qty: 30 TABLET | Refills: 0 | Status: SHIPPED | OUTPATIENT
Start: 2022-02-09 | End: 2022-03-25

## 2022-02-09 NOTE — TELEPHONE ENCOUNTER
Medication is being filled for 1 time refill only due to:  overdue for labs and appointment.    JACINDA Yoder

## 2022-03-14 DIAGNOSIS — E78.5 HYPERLIPIDEMIA LDL GOAL <130: ICD-10-CM

## 2022-03-14 DIAGNOSIS — I10 ESSENTIAL HYPERTENSION: ICD-10-CM

## 2022-03-15 RX ORDER — LOSARTAN POTASSIUM 100 MG/1
TABLET ORAL
Qty: 90 TABLET | Refills: 0 | Status: SHIPPED | OUTPATIENT
Start: 2022-03-15 | End: 2022-04-07

## 2022-03-23 DIAGNOSIS — E78.5 HYPERLIPIDEMIA LDL GOAL <130: ICD-10-CM

## 2022-03-24 NOTE — TELEPHONE ENCOUNTER
Routing refill request to provider for review/approval because:  Labs not current:  Lipids > year    Jeremy Morales RN

## 2022-03-25 RX ORDER — ROSUVASTATIN CALCIUM 10 MG/1
TABLET, COATED ORAL
Qty: 30 TABLET | Refills: 0 | Status: SHIPPED | OUTPATIENT
Start: 2022-03-25 | End: 2022-04-07

## 2022-03-26 DIAGNOSIS — E29.1 HYPOGONADISM MALE: ICD-10-CM

## 2022-03-28 RX ORDER — TESTOSTERONE CYPIONATE 200 MG/ML
INJECTION, SOLUTION INTRAMUSCULAR
Qty: 3 ML | Refills: 0 | Status: SHIPPED | OUTPATIENT
Start: 2022-03-28 | End: 2022-04-07

## 2022-04-07 ENCOUNTER — OFFICE VISIT (OUTPATIENT)
Dept: FAMILY MEDICINE | Facility: CLINIC | Age: 52
End: 2022-04-07
Payer: COMMERCIAL

## 2022-04-07 ENCOUNTER — HOSPITAL ENCOUNTER (OUTPATIENT)
Dept: GENERAL RADIOLOGY | Facility: CLINIC | Age: 52
Discharge: HOME | End: 2022-04-07
Attending: INTERNAL MEDICINE | Admitting: INTERNAL MEDICINE
Payer: COMMERCIAL

## 2022-04-07 VITALS
HEIGHT: 75 IN | HEART RATE: 91 BPM | TEMPERATURE: 97 F | OXYGEN SATURATION: 93 % | SYSTOLIC BLOOD PRESSURE: 120 MMHG | WEIGHT: 315 LBS | BODY MASS INDEX: 39.17 KG/M2 | DIASTOLIC BLOOD PRESSURE: 84 MMHG | RESPIRATION RATE: 20 BRPM

## 2022-04-07 DIAGNOSIS — E29.1 HYPOGONADISM MALE: ICD-10-CM

## 2022-04-07 DIAGNOSIS — Z00.00 ROUTINE GENERAL MEDICAL EXAMINATION AT A HEALTH CARE FACILITY: Primary | ICD-10-CM

## 2022-04-07 DIAGNOSIS — G47.33 OBSTRUCTIVE SLEEP APNEA: ICD-10-CM

## 2022-04-07 DIAGNOSIS — Z12.5 SCREENING FOR PROSTATE CANCER: ICD-10-CM

## 2022-04-07 DIAGNOSIS — R00.2 PALPITATIONS: ICD-10-CM

## 2022-04-07 DIAGNOSIS — E66.01 MORBID OBESITY (H): ICD-10-CM

## 2022-04-07 DIAGNOSIS — Z13.6 CARDIOVASCULAR SCREENING; LDL GOAL LESS THAN 130: ICD-10-CM

## 2022-04-07 DIAGNOSIS — R06.09 DOE (DYSPNEA ON EXERTION): ICD-10-CM

## 2022-04-07 DIAGNOSIS — E78.5 HYPERLIPIDEMIA LDL GOAL <130: ICD-10-CM

## 2022-04-07 DIAGNOSIS — Z13.88 SCREENING FOR LEAD EXPOSURE: ICD-10-CM

## 2022-04-07 DIAGNOSIS — Z11.59 NEED FOR HEPATITIS C SCREENING TEST: ICD-10-CM

## 2022-04-07 DIAGNOSIS — I10 ESSENTIAL HYPERTENSION: ICD-10-CM

## 2022-04-07 DIAGNOSIS — I10 BENIGN ESSENTIAL HYPERTENSION: ICD-10-CM

## 2022-04-07 DIAGNOSIS — G47.30 HYPERSOMNIA WITH SLEEP APNEA: ICD-10-CM

## 2022-04-07 DIAGNOSIS — G47.10 HYPERSOMNIA WITH SLEEP APNEA: ICD-10-CM

## 2022-04-07 LAB
ALBUMIN SERPL-MCNC: 3.6 G/DL (ref 3.4–5)
ALP SERPL-CCNC: 78 U/L (ref 40–150)
ALT SERPL W P-5'-P-CCNC: 49 U/L (ref 0–70)
ANION GAP SERPL CALCULATED.3IONS-SCNC: 3 MMOL/L (ref 3–14)
AST SERPL W P-5'-P-CCNC: 28 U/L (ref 0–45)
BILIRUB SERPL-MCNC: 0.6 MG/DL (ref 0.2–1.3)
BUN SERPL-MCNC: 19 MG/DL (ref 7–30)
CALCIUM SERPL-MCNC: 8.7 MG/DL (ref 8.5–10.1)
CHLORIDE BLD-SCNC: 107 MMOL/L (ref 94–109)
CHOLEST SERPL-MCNC: 151 MG/DL
CO2 SERPL-SCNC: 29 MMOL/L (ref 20–32)
CREAT SERPL-MCNC: 0.99 MG/DL (ref 0.66–1.25)
ERYTHROCYTE [DISTWIDTH] IN BLOOD BY AUTOMATED COUNT: 13.2 % (ref 10–15)
FASTING STATUS PATIENT QL REPORTED: YES
GFR SERPL CREATININE-BSD FRML MDRD: >90 ML/MIN/1.73M2
GLUCOSE BLD-MCNC: 89 MG/DL (ref 70–99)
HCT VFR BLD AUTO: 44.3 % (ref 40–53)
HCV AB SERPL QL IA: NONREACTIVE
HDLC SERPL-MCNC: 38 MG/DL
HGB BLD-MCNC: 14.7 G/DL (ref 13.3–17.7)
HOLD SPECIMEN: NORMAL
LDLC SERPL CALC-MCNC: 90 MG/DL
MCH RBC QN AUTO: 29.3 PG (ref 26.5–33)
MCHC RBC AUTO-ENTMCNC: 33.2 G/DL (ref 31.5–36.5)
MCV RBC AUTO: 88 FL (ref 78–100)
NONHDLC SERPL-MCNC: 113 MG/DL
PLATELET # BLD AUTO: 217 10E3/UL (ref 150–450)
POTASSIUM BLD-SCNC: 4.1 MMOL/L (ref 3.4–5.3)
PROT SERPL-MCNC: 7.5 G/DL (ref 6.8–8.8)
PSA SERPL-MCNC: 0.72 UG/L (ref 0–4)
RBC # BLD AUTO: 5.02 10E6/UL (ref 4.4–5.9)
SHBG SERPL-SCNC: 20 NMOL/L (ref 11–80)
SODIUM SERPL-SCNC: 139 MMOL/L (ref 133–144)
TRIGL SERPL-MCNC: 117 MG/DL
TSH SERPL DL<=0.005 MIU/L-ACNC: 0.84 MU/L (ref 0.4–4)
WBC # BLD AUTO: 5.5 10E3/UL (ref 4–11)

## 2022-04-07 PROCEDURE — 80061 LIPID PANEL: CPT | Performed by: INTERNAL MEDICINE

## 2022-04-07 PROCEDURE — 99214 OFFICE O/P EST MOD 30 MIN: CPT | Mod: 25 | Performed by: INTERNAL MEDICINE

## 2022-04-07 PROCEDURE — 84270 ASSAY OF SEX HORMONE GLOBUL: CPT | Performed by: INTERNAL MEDICINE

## 2022-04-07 PROCEDURE — 36415 COLL VENOUS BLD VENIPUNCTURE: CPT | Performed by: INTERNAL MEDICINE

## 2022-04-07 PROCEDURE — G0103 PSA SCREENING: HCPCS | Performed by: INTERNAL MEDICINE

## 2022-04-07 PROCEDURE — 85027 COMPLETE CBC AUTOMATED: CPT | Performed by: INTERNAL MEDICINE

## 2022-04-07 PROCEDURE — 99396 PREV VISIT EST AGE 40-64: CPT | Performed by: INTERNAL MEDICINE

## 2022-04-07 PROCEDURE — 84443 ASSAY THYROID STIM HORMONE: CPT | Performed by: INTERNAL MEDICINE

## 2022-04-07 PROCEDURE — 86803 HEPATITIS C AB TEST: CPT | Performed by: INTERNAL MEDICINE

## 2022-04-07 PROCEDURE — 71046 X-RAY EXAM CHEST 2 VIEWS: CPT

## 2022-04-07 PROCEDURE — 93000 ELECTROCARDIOGRAM COMPLETE: CPT | Performed by: INTERNAL MEDICINE

## 2022-04-07 PROCEDURE — 84403 ASSAY OF TOTAL TESTOSTERONE: CPT | Performed by: INTERNAL MEDICINE

## 2022-04-07 PROCEDURE — 80053 COMPREHEN METABOLIC PANEL: CPT | Performed by: INTERNAL MEDICINE

## 2022-04-07 RX ORDER — ROSUVASTATIN CALCIUM 10 MG/1
10 TABLET, COATED ORAL DAILY
Qty: 90 TABLET | Refills: 3 | Status: SHIPPED | OUTPATIENT
Start: 2022-04-07 | End: 2023-02-21

## 2022-04-07 RX ORDER — LOSARTAN POTASSIUM 100 MG/1
100 TABLET ORAL DAILY
Qty: 90 TABLET | Refills: 3 | Status: SHIPPED | OUTPATIENT
Start: 2022-04-07 | End: 2023-02-21

## 2022-04-07 RX ORDER — TESTOSTERONE CYPIONATE 200 MG/ML
100 INJECTION, SOLUTION INTRAMUSCULAR
Qty: 3 ML | Refills: 5 | Status: SHIPPED | OUTPATIENT
Start: 2022-04-07 | End: 2022-11-08

## 2022-04-07 ASSESSMENT — ENCOUNTER SYMPTOMS
HEMATURIA: 0
EYE PAIN: 0
WEAKNESS: 0
COUGH: 0
PARESTHESIAS: 0
SHORTNESS OF BREATH: 1
MYALGIAS: 0
NAUSEA: 0
NERVOUS/ANXIOUS: 1
JOINT SWELLING: 0
FEVER: 0
FREQUENCY: 0
HEARTBURN: 0
HEMATOCHEZIA: 0
ARTHRALGIAS: 0
CHILLS: 0
CONSTIPATION: 0
DIZZINESS: 0
DIARRHEA: 0
ABDOMINAL PAIN: 0
HEADACHES: 1
PALPITATIONS: 0
DYSURIA: 0
SORE THROAT: 0

## 2022-04-07 ASSESSMENT — PAIN SCALES - GENERAL: PAINLEVEL: NO PAIN (0)

## 2022-04-07 NOTE — PROGRESS NOTES
SUBJECTIVE:   CC: Amarjit Malik is an 51 year old male who presents for preventative health visit.       Patient has been advised of split billing requirements and indicates understanding: Yes  Healthy Habits:     Getting at least 3 servings of Calcium per day:  Yes    Bi-annual eye exam:  Yes    Dental care twice a year:  NO    Sleep apnea or symptoms of sleep apnea:  Sleep apnea    Diet:  Regular (no restrictions)    Frequency of exercise:  1 day/week    Duration of exercise:  N/A    Taking medications regularly:  Yes    Medication side effects:  Not applicable    PHQ-2 Total Score: 1    Additional concerns today:  No  YES    Low T  --noting more fatigue  --was out, and just restarted; did not note improvement with starting T    Hypertension:  -taking losartan    Shortness of breath:  --for a few months; dyspnea on exertion, more than he would expect  --acute on chronic chest pain;  Has always attributed chest pain to GERD  --no shortness of breath at rest;   --wearing CPAP regularly  --no orthopnea, PND  --increase in fatigue  --no leg swelling  --occ feels palpitation  --the chest pain is not always related to exertion  --chest pain lasts ~ 30-60 min; will use Tums and this helps; when present, it does not worsen with exertion or improve with sitting/rest;  Can improve with laying down.  --using voltaren HS regularly  --having neck/shoulder muscle tension/pain leading to headache   --He goes to a shooting range frequently and is exposed to a lot of lead bullets, so wonders if heavy metal or lead exposure could be a culprit      Today's PHQ-2 Score:   PHQ-2 ( 1999 [employer]) 4/7/2022   Q1: Little interest or pleasure in doing things 1   Q2: Feeling down, depressed or hopeless 0   PHQ-2 Score 1   PHQ-2 Total Score (12-17 Years)- Positive if 3 or more points; Administer PHQ-A if positive -   Q1: Little interest or pleasure in doing things Several days   Q2: Feeling down, depressed or hopeless Not at all   PHQ-2 Score  1       Abuse: Current or Past(Physical, Sexual or Emotional)- No  Do you feel safe in your environment? Yes    Have you ever done Advance Care Planning? (For example, a Health Directive, POLST, or a discussion with a medical provider or your loved ones about your wishes): No, advance care planning information given to patient to review.  Patient plans to discuss their wishes with loved ones or provider.      Social History     Tobacco Use     Smoking status: Never Smoker     Smokeless tobacco: Never Used   Substance Use Topics     Alcohol use: No     Comment: rare     If you drink alcohol do you typically have >3 drinks per day or >7 drinks per week? No    Alcohol Use 4/7/2022   Prescreen: >3 drinks/day or >7 drinks/week? No   Prescreen: >3 drinks/day or >7 drinks/week? -   No flowsheet data found.    Last PSA:   PSA   Date Value Ref Range Status   08/11/2020 0.53 0 - 4 ug/L Final     Comment:     Assay Method:  Chemiluminescence using Siemens Vista analyzer       Reviewed orders with patient. Reviewed health maintenance and updated orders accordingly - Yes  Current Outpatient Medications   Medication Sig Dispense Refill     diclofenac (VOLTAREN) 50 MG EC tablet TAKE ONE TABLET BY MOUTH TWICE A DAY AS NEEDED FOR MODERATE PAIN 60 tablet 11     hydrOXYzine (ATARAX) 25 MG tablet Take 25-50 mg up to three times daily as needed and  mg at night for sleep and anxiety. 30 tablet 3     losartan (COZAAR) 100 MG tablet TAKE ONE TABLET BY MOUTH ONCE DAILY 90 tablet 0     ORDER FOR DME Auto-CPAP:  Max 15 cm H2O  Min 8 cm H2O    Continuous    Lifetime need and heated humidity.    1 each 0     pantoprazole (PROTONIX) 40 MG EC tablet TAKE ONE TABLET BY MOUTH ONCE DAILY 90 tablet 3     PARoxetine (PAXIL) 10 MG tablet TAKE ONE TABLET BY MOUTH EVERY MORNING, TAKE IN ADDITION TO ONE 40MG TABLET FOR A TOTAL DAILY DOSE OF 50MG 90 tablet 3     PARoxetine (PAXIL) 40 MG tablet TAKE ONE TABLET BY MOUTH EVERY NIGHT AT BEDTIME 90  "tablet 3     rosuvastatin (CRESTOR) 10 MG tablet TAKE ONE TABLET BY MOUTH ONCE DAILY, DUE FOR FASTING LABS 30 tablet 0     testosterone cypionate (DEPOTESTOSTERONE) 200 MG/ML injection INJECT 0.25ML INTRAMUSCULARLY EVERY 14 DAYS. DUE FOR FASTING LABS AND FOLLOW UP 3 mL 0     tiZANidine (ZANAFLEX) 4 MG tablet Take 1 tablet (4 mg) by mouth 3 times daily 21 tablet 0       Reviewed and updated as needed this visit by clinical staff   Tobacco  Allergies  Meds  Problems  Med Hx  Surg Hx  Fam Hx  Soc   Hx        Reviewed and updated as needed this visit by Provider      Problems                Review of Systems   Constitutional: Negative for chills and fever.   HENT: Negative for congestion, ear pain, hearing loss and sore throat.    Eyes: Negative for pain and visual disturbance.   Respiratory: Positive for shortness of breath. Negative for cough.    Cardiovascular: Positive for chest pain. Negative for palpitations and peripheral edema.   Gastrointestinal: Negative for abdominal pain, constipation, diarrhea, heartburn, hematochezia and nausea.   Genitourinary: Negative for dysuria, frequency, genital sores, hematuria, impotence, penile discharge and urgency.   Musculoskeletal: Negative for arthralgias, joint swelling and myalgias.   Skin: Negative for rash.   Neurological: Positive for headaches. Negative for dizziness, weakness and paresthesias.   Psychiatric/Behavioral: Negative for mood changes. The patient is nervous/anxious.          OBJECTIVE:   /84 (BP Location: Left arm, Patient Position: Sitting, Cuff Size: Adult Large)   Pulse 91   Temp 97  F (36.1  C) (Tympanic)   Resp 20   Ht 1.91 m (6' 3.2\")   Wt (!) 150.3 kg (331 lb 6.4 oz)   SpO2 93%   BMI 41.21 kg/m      Physical Exam  GENERAL: healthy, alert and no distress  EYES: Eyes grossly normal to inspection, PERRL and conjunctivae and sclerae normal  HENT: ear canals and TM's normal, nose and mouth without ulcers or lesions  NECK: no " adenopathy, no asymmetry, masses, or scars and thyroid normal to palpation.  No JVD  RESP: lungs clear to auscultation - no rales, rhonchi or wheezes  CV: regular rate and rhythm, normal S1 S2, no S3 or S4, no murmur, click or rub, no peripheral edema  ABDOMEN: soft, nontender, no hepatosplenomegaly, no masses and bowel sounds normal  MS: no gross musculoskeletal defects noted, no edema  SKIN: no suspicious lesions or rashes  NEURO: Normal strength and tone, mentation intact and speech normal  PSYCH: mentation appears normal, affect normal/bright    Diagnostic Test Results:  Labs reviewed in [facility]  EKG - NSR    ASSESSMENT/PLAN:   (Z00.00) Routine general medical examination at a health care facility  (primary encounter diagnosis)  Comment:   Plan:     (R06.00) SEAY (dyspnea on exertion)  Comment: Long differential including anemia, thyroid, deconditioning, arrhythmia, ischemic, diastolic CHF.  EKG is normal today.  Get chest x-ray and stress test.  If normal, would get a 3-day Zio patch and follow-up in clinic after that.  If symptoms worsen, go to the ER.  Plan: EKG 12-lead complete w/read - Clinics, XR Chest        2 Views, Echocardiogram Exercise Stress            (R00.2) Palpitations  Comment: Work-up as above  Plan: EKG 12-lead complete w/read - Clinics, XR Chest        2 Views, Echocardiogram Exercise Stress, TSH         with free T4 reflex            (E66.01) Morbid obesity (H)  Comment: See AVS for diet tips  Plan:     (G47.33) Obstructive sleep apnea  Comment: Uses CPAP regularly  Plan:     (G47.10,  G47.30) Hypersomnia with sleep apnea  Comment: Uses CPAP regularly  Plan:     (I10) Essential hypertension  Comment:  - stable, refill provided  Plan: losartan (COZAAR) 100 MG tablet, CBC with         platelets, Basic metabolic panel  (Ca, Cl, CO2,        Creat, Gluc, K, Na, BUN)            (Z13.88) Screening for lead exposure  Comment:   Plan: Lead Capillary            (E78.5) Hyperlipidemia LDL goal  "<130  Comment:   Plan: losartan (COZAAR) 100 MG tablet, rosuvastatin         (CRESTOR) 10 MG tablet            (E29.1) Hypogonadism male  Comment: Did not find much benefit with the 50 mg.  Increase 200 mg.  In 2 months, check fasting levels about 1 week after dose  Plan: testosterone cypionate (DEPOTESTOSTERONE) 200         MG/ML injection, Testosterone Free and Total            (Z11.59) Need for hepatitis C screening test  Comment:   Plan: Hepatitis C Screen Reflex to HCV RNA Quant and         Genotype            (Z12.5) Screening for prostate cancer  Comment: Needs yearly screening due to testosterone replacement  Plan: PSA, screen            (Z13.6) CARDIOVASCULAR SCREENING; LDL GOAL LESS THAN 130  Comment: Needs yearly screening  Plan: Lipid panel reflex to direct LDL Fasting              Patient has been advised of split billing requirements and indicates understanding: Yes    COUNSELING:   Reviewed preventive health counseling, as reflected in patient instructions    Estimated body mass index is 41.21 kg/m  as calculated from the following:    Height as of this encounter: 1.91 m (6' 3.2\").    Weight as of this encounter: 150.3 kg (331 lb 6.4 oz).     Weight management plan: Discussed healthy diet and exercise guidelines    He reports that he has never smoked. He has never used smokeless tobacco.      Counseling Resources:  ATP IV Guidelines  Pooled Cohorts Equation Calculator  FRAX Risk Assessment  ICSI Preventive Guidelines  Dietary Guidelines for Americans, 2010  USDA's MyPlate  ASA Prophylaxis  Lung CA Screening    Ashley Crawley DO  [facility]  "

## 2022-04-07 NOTE — PATIENT INSTRUCTIONS
Preventive Health Recommendations  Male Ages 50 - 64    Yearly exam:             See your health care provider every year in order to  o   Review health changes.   o   Discuss preventive care.    o   Review your medicines if your doctor has prescribed any.     Have a cholesterol test every 5 years, or more frequently if you are at risk for high cholesterol/heart disease.     Have a diabetes test (fasting glucose) every three years. If you are at risk for diabetes, you should have this test more often.     Have a colonoscopy at age 50, or have a yearly FIT test (stool test). These exams will check for colon cancer.      Talk with your health care provider about whether or not a prostate cancer screening test (PSA) is right for you.    You should be tested each year for STDs (sexually transmitted diseases), if you re at risk.     Shots: Get a flu shot each year. Get a tetanus shot every 10 years.     Nutrition:    Eat at least 5 servings of fruits and vegetables daily.     Eat whole-grain bread, whole-wheat pasta and brown rice instead of white grains and rice.     Get adequate Calcium and Vitamin D.     Lifestyle    Exercise for at least 150 minutes a week (30 minutes a day, 5 days a week). This will help you control your weight and prevent disease.     Limit alcohol to one drink per day.     No smoking.     Wear sunscreen to prevent skin cancer.     See your dentist every six months for an exam and cleaning.     See your eye doctor every 1 to 2 years.    Dr. Crawley's Tips for a Healthy Diet    1. Add more fresh fruits and vegetables to your diet.  The more colorful with the fruit or vegetable (think berries, spinach, carrots, peppers) the healthier it tends to be.  Juice is not a fruit.  Prepare the vegetables in a healthy way - steam, bake.  Avoid batter/breading, butter/oil to cook.  2. Add more fiber to your diet.  Swap out white bread, white rice, white pasta for whole grain versions.  Reduce the portion size  and frequency of carbohydrates/starches.  3. Choose healthier fats such as nuts, olive oil, avocado, etc. Stay away from lard, butter.  4. Decrease the frequency and portion size of 'junk food' -pizza, candy, cookies, potato chips, etc.  5. Watch liquid calories such as coffee drinks, juice, soda, teas.  There tends to be excessive sugar in these beverages.  6. Increase protein in your diet.  Eggs, cheese, yogurt, nuts, lentils, chicken, fish are good healthy choices.  Protein keeps you gomes longer, and you are less likely to have blood sugar spikes  7. Eat healthy at least 80% of the time.  It is ok for a special treat (Mom's spaghetti dinner, cake on your birthday) every once in a while, just not every day.  When are you going to indulge (think [employer] time), be sure you are eating extra healthy the day before and after.      Resources:    1. [employer] Resources for Health and Wellness:https://[email or web address].[email or web address].edu/dig-yes-foods    2.   [employer]Guide Ways To Wellness:    https://www.[employer].org/services/ways-to-wellness  Ways to Wellness offers:    Nutrition and weight management     Corrective exercise and fitness training     Lifestyle and behavioral change     Healing services  Our team includes registered dietitians, certified personal trainers, life coaches, as well as a Culinary Educator.    3. [facility] for Cardiovascular Disease Prevention  Consider making an appointment at the [facility] if either of the following describes you:    You are an adult who has risk factors for cardiovascular disease. Risk factors include cholesterol elevations, diabetes, high blood pressure, elevated weight, inactive living, and history of tobacco use.     You don t have traditional risk factors but have a strong family history of cardiovascular disease, which may mean you have a higher of risk of cardiovascular disease.     4. Atomic Habits by Ciaran Martino.  This a good book that looks into our habits and  how to sustain good healthy habits and get rid of bad habits.        Low T:  1. Increase dose to 100 mg 2 months after taking the testosterone regularly, get fasting AM T level 1 week after your dose  2. Will adjust medication if needed    Hypertension:  1. Refill sent    Chest pain/ shortness of breath   1. Blood work today  2. EKG and Chest xray  3. If all normal, get stress test. You need to have a cardiology test done.  Please call [phone] to schedule.   4. If normal, next step would be 3 day monitor  5. Follow-up after testing  6. If symptoms worsen, follow-up sooner

## 2022-04-08 DIAGNOSIS — E29.1 HYPOGONADISM MALE: Primary | ICD-10-CM

## 2022-04-08 NOTE — RESULT ENCOUNTER NOTE
Liver, Kidney function, blood sugar, electrolytes, blood counts are normal.  Routine screening for hepatitis C is negative.  Thyroid is normal.  PSA is normal.  Cholesterol is improved from 1 year ago.    This testosterone was collected, but was not due for another 2 months, this should be credited to patient account

## 2022-04-11 DIAGNOSIS — Z12.5 SPECIAL SCREENING, PROSTATE CANCER: ICD-10-CM

## 2022-04-11 DIAGNOSIS — R00.2 PALPITATIONS: ICD-10-CM

## 2022-04-11 DIAGNOSIS — E78.5 HYPERLIPEMIA: ICD-10-CM

## 2022-04-11 DIAGNOSIS — G47.10 HYPERSOMNIA, UNSPECIFIED: ICD-10-CM

## 2022-04-11 DIAGNOSIS — G47.33 OBSTRUCTIVE SLEEP APNEA: ICD-10-CM

## 2022-04-11 DIAGNOSIS — R06.09 DOE (DYSPNEA ON EXERTION): ICD-10-CM

## 2022-04-11 DIAGNOSIS — E29.1 HYPOGONADISM MALE: Primary | ICD-10-CM

## 2022-04-11 DIAGNOSIS — Z13.88 SCREENING FOR LEAD EXPOSURE: ICD-10-CM

## 2022-04-11 DIAGNOSIS — Z00.00 ROUTINE GENERAL MEDICAL EXAMINATION AT A HEALTH CARE FACILITY: ICD-10-CM

## 2022-04-11 DIAGNOSIS — E66.01 MORBID OBESITY (H): ICD-10-CM

## 2022-04-11 DIAGNOSIS — Z13.6 SCREENING FOR HEART DISEASE: ICD-10-CM

## 2022-04-15 LAB
TESTOST FREE SERPL-MCNC: 4.18 NG/DL
TESTOST SERPL-MCNC: 171 NG/DL (ref 240–950)

## 2022-05-11 ENCOUNTER — HOSPITAL ENCOUNTER (EMERGENCY)
Facility: CLINIC | Age: 52
Discharge: HOME | End: 2022-05-11
Attending: EMERGENCY MEDICINE | Admitting: EMERGENCY MEDICINE
Payer: COMMERCIAL

## 2022-05-11 VITALS
WEIGHT: 315 LBS | OXYGEN SATURATION: 94 % | TEMPERATURE: 97.8 F | DIASTOLIC BLOOD PRESSURE: 112 MMHG | RESPIRATION RATE: 14 BRPM | HEART RATE: 83 BPM | BODY MASS INDEX: 38.36 KG/M2 | SYSTOLIC BLOOD PRESSURE: 147 MMHG | HEIGHT: 76 IN

## 2022-05-11 DIAGNOSIS — R51.9 ACUTE NONINTRACTABLE HEADACHE, UNSPECIFIED HEADACHE TYPE: ICD-10-CM

## 2022-05-11 LAB
HOLD SPECIMEN: NORMAL

## 2022-05-11 PROCEDURE — 96374 THER/PROPH/DIAG INJ IV PUSH: CPT | Performed by: EMERGENCY MEDICINE

## 2022-05-11 PROCEDURE — 99284 EMERGENCY DEPT VISIT MOD MDM: CPT | Performed by: EMERGENCY MEDICINE

## 2022-05-11 PROCEDURE — 99284 EMERGENCY DEPT VISIT MOD MDM: CPT | Mod: 25 | Performed by: EMERGENCY MEDICINE

## 2022-05-11 PROCEDURE — 250N000011 HC RX IP 250 OP 636: Performed by: FAMILY MEDICINE

## 2022-05-11 PROCEDURE — 258N000003 HC RX IP 258 OP 636: Performed by: EMERGENCY MEDICINE

## 2022-05-11 PROCEDURE — 96375 TX/PRO/DX INJ NEW DRUG ADDON: CPT | Performed by: EMERGENCY MEDICINE

## 2022-05-11 PROCEDURE — 96361 HYDRATE IV INFUSION ADD-ON: CPT | Performed by: EMERGENCY MEDICINE

## 2022-05-11 PROCEDURE — 250N000011 HC RX IP 250 OP 636: Performed by: EMERGENCY MEDICINE

## 2022-05-11 RX ORDER — ONDANSETRON 2 MG/ML
4 INJECTION INTRAMUSCULAR; INTRAVENOUS ONCE
Status: COMPLETED | OUTPATIENT
Start: 2022-05-11 | End: 2022-05-11

## 2022-05-11 RX ORDER — DEXAMETHASONE SODIUM PHOSPHATE 10 MG/ML
10 INJECTION, SOLUTION INTRAMUSCULAR; INTRAVENOUS ONCE
Status: COMPLETED | OUTPATIENT
Start: 2022-05-11 | End: 2022-05-11

## 2022-05-11 RX ORDER — DIPHENHYDRAMINE HYDROCHLORIDE 50 MG/ML
50 INJECTION INTRAMUSCULAR; INTRAVENOUS ONCE
Status: COMPLETED | OUTPATIENT
Start: 2022-05-11 | End: 2022-05-11

## 2022-05-11 RX ADMIN — DEXAMETHASONE SODIUM PHOSPHATE 10 MG: 10 INJECTION, SOLUTION INTRAMUSCULAR; INTRAVENOUS at 01:16

## 2022-05-11 RX ADMIN — DIPHENHYDRAMINE HYDROCHLORIDE 50 MG: 50 INJECTION, SOLUTION INTRAMUSCULAR; INTRAVENOUS at 01:16

## 2022-05-11 RX ADMIN — PROCHLORPERAZINE EDISYLATE 10 MG: 5 INJECTION INTRAMUSCULAR; INTRAVENOUS at 01:16

## 2022-05-11 RX ADMIN — SODIUM CHLORIDE 1000 ML: 9 INJECTION, SOLUTION INTRAVENOUS at 01:17

## 2022-05-11 RX ADMIN — ONDANSETRON 4 MG: 2 INJECTION INTRAMUSCULAR; INTRAVENOUS at 01:00

## 2022-05-11 ASSESSMENT — ENCOUNTER SYMPTOMS
CONFUSION: 0
NECK STIFFNESS: 0
LIGHT-HEADEDNESS: 0
VOMITING: 1
SORE THROAT: 0
SHORTNESS OF BREATH: 0
EYE PAIN: 0
FEVER: 0
BACK PAIN: 0
DIARRHEA: 0
APPETITE CHANGE: 0
HEADACHES: 1
ABDOMINAL PAIN: 0
FREQUENCY: 0
NECK PAIN: 0
SPEECH DIFFICULTY: 0
CHEST TIGHTNESS: 0
PHOTOPHOBIA: 1
NAUSEA: 1
SEIZURES: 0
NUMBNESS: 0
DYSURIA: 0
FATIGUE: 0

## 2022-05-11 NOTE — ED PROVIDER NOTES
History     Chief Complaint   Patient presents with     Vomiting     HPI  Amarjit Malik is a 51 year old male with a history of hypertension, hyperlipidemia, and anxiety presenting for evaluation of progressive headache this evening.  Headache began in the late afternoon and progressively worsened through the evening hours.  Headache is from the posterior neck area radiating across the top of his head to behind his eyes.  Headache is not more prominent on the left or the right.  Headache has developed slowly over the evening to the point of a severe headache.  He also has developed associated photophobia with nausea and a few episodes of vomiting.  Denies history of migraine.  Denies similar headaches in the past.  Tried to take some ibuprofen at home but threw up almost instantly after.  Denies fevers or chills.  No known sick contacts.  No known bad food exposure.  Patient has been under increased stress with graduation of his children from high school.  Denies any trauma.  Denies any numbness, tingling, or weakness.    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         Gastroesophageal reflux disease without esophagitis 06/16/2007     Priority: Medium     symptoms stable on protonix - last EGD shows hiatal hernia but no other findings  - continue meds.  2018  - Discussed long term risk/benefits.  Benefits > risk, patient opts to continue, failed trial off PPI       Hypersomnia with sleep apnea 2006     Priority: Medium     noted apnea/snoring on sleeping - sleep study. Wears CPAP regularly            Past Medical History:    Past Medical History:   Diagnosis Date     Anxiety      Gastro-oesophageal reflux disease      Obesity (BMI 30-39.9)      Obstructive sleep apnea (adult) (pediatric)      panic attacks, and anxiety  2006     PONV (postoperative nausea and vomiting)      Pure hypercholesterolemia      Unspecified essential hypertension        Past Surgical History:    Past Surgical History:   Procedure Laterality Date     COLONOSCOPY N/A 2020    Procedure: COLONOSCOPY;  Surgeon: Frank Echeverria MD;  Location: [facility]     ESOPHAGOSCOPY, GASTROSCOPY, DUODENOSCOPY (EGD), COMBINED  2012    Procedure: COMBINED ESOPHAGOSCOPY, GASTROSCOPY, DUODENOSCOPY (EGD);  Gastroscopy  ;  Surgeon: Luc Smith MD;  Location: [facility]     ESOPHAGOSCOPY, GASTROSCOPY, DUODENOSCOPY (EGD), COMBINED N/A 2020    Procedure: ESOPHAGOGASTRODUODENOSCOPY, WITH BIOPSY;  Surgeon: Frank Echeverria MD;  Location: [facility]     HERNIA REPAIR       PROCURE BONE MARROW  1/3/2014    Procedure: PROCURE BONE MARROW;  Bone Marrow [facility];  Surgeon: Chaz Negro MD;  Location:  OR     SURGICAL HISTORY OF -       excision of bone tumor     SURGICAL HISTORY OF -       vasectomy       Family History:    Family History   Problem Relation Age of Onset     Diabetes Mother         type 2     Hypertension Father      Prostate Cancer Paternal Grandfather      Kidney Cancer Paternal Grandfather      Diabetes Brother      Heart Disease Paternal Uncle         MI at 47 yo     C.A.D. Other         paternal uncle  at 45 of MI     Cancer - colorectal No family hx of        Social History:  Marital Status:   [2]  Social History     Tobacco Use     Smoking status: Never  "Smoker     Smokeless tobacco: Never Used   Vaping Use     Vaping Use: Never used   Substance Use Topics     Alcohol use: No     Comment: rare     Drug use: No        Medications:    diclofenac (VOLTAREN) 50 MG EC tablet  hydrOXYzine (ATARAX) 25 MG tablet  losartan (COZAAR) 100 MG tablet  ORDER FOR DME  pantoprazole (PROTONIX) 40 MG EC tablet  PARoxetine (PAXIL) 10 MG tablet  PARoxetine (PAXIL) 40 MG tablet  rosuvastatin (CRESTOR) 10 MG tablet  testosterone cypionate (DEPOTESTOSTERONE) 200 MG/ML injection  tiZANidine (ZANAFLEX) 4 MG tablet          Review of Systems   Constitutional: Negative for appetite change, fatigue and fever.   HENT: Negative for congestion and sore throat.    Eyes: Positive for photophobia. Negative for pain and visual disturbance.   Respiratory: Negative for chest tightness and shortness of breath.    Cardiovascular: Negative for chest pain.   Gastrointestinal: Positive for nausea and vomiting. Negative for abdominal pain and diarrhea.   Genitourinary: Negative for decreased urine volume, dysuria and frequency.   Musculoskeletal: Negative for back pain, neck pain and neck stiffness.   Neurological: Positive for headaches. Negative for seizures, syncope, speech difficulty, light-headedness and numbness.   Psychiatric/Behavioral: Negative for confusion.   All other systems reviewed and are negative.      Physical Exam   BP: (!) 154/108  Pulse: 81  Temp: 97.8  F (36.6  C)  Resp: 14  Height: 193 cm (6' 4\")  Weight: (!) 158.8 kg (350 lb)  SpO2: 94 %      Physical Exam  Vitals and nursing note reviewed.   Constitutional:       Appearance: Normal appearance. He is not ill-appearing or diaphoretic.      Comments: Uncomfortable appearing laying supine in a darkened room.  Has clear photophobia and keeps his eyes closed   HENT:      Head: Atraumatic.      Nose: Nose normal.      Mouth/Throat:      Mouth: Mucous membranes are moist.   Eyes:      Conjunctiva/sclera: Conjunctivae normal.      Pupils: " Pupils are equal, round, and reactive to light.   Cardiovascular:      Rate and Rhythm: Normal rate.      Pulses: Normal pulses.   Pulmonary:      Effort: Pulmonary effort is normal.   Abdominal:      General: Abdomen is flat.      Palpations: Abdomen is soft.      Tenderness: There is no abdominal tenderness.   Musculoskeletal:         General: Normal range of motion.      Cervical back: Normal range of motion.   Skin:     General: Skin is warm and dry.      Capillary Refill: Capillary refill takes less than 2 seconds.   Neurological:      Mental Status: He is alert and oriented to person, place, and time.      Sensory: No sensory deficit.      Motor: No weakness.   Psychiatric:         Mood and Affect: Mood normal.         ED Course                 Procedures                  Results for orders placed or performed during the hospital encounter of 05/11/22 (from the past 24 hour(s))   [place] Draw    Narrative    The following orders were created for panel order [place] Draw.  Procedure                               Abnormality         Status                     ---------                               -----------         ------                     Extra Blue Top Tube[[id number]]                              Final result               Extra Green Top (Lithium...[[id number]]                      Final result               Extra Purple Top Tube[[id number]]                            Final result                 Please view results for these tests on the individual orders.   Extra Blue Top Tube   Result Value Ref Range    Hold Specimen JIC    Extra Green Top (Lithium Heparin) Tube   Result Value Ref Range    Hold Specimen JIC    Extra Purple Top Tube   Result Value Ref Range    Hold Specimen JIC        Medications   ondansetron (ZOFRAN) injection 4 mg (4 mg Intravenous Given 5/11/22 0100)   prochlorperazine (COMPAZINE) injection 10 mg (10 mg Intravenous Given 5/11/22 0116)   diphenhydrAMINE (BENADRYL) injection 50 mg (50  mg Intravenous Given 5/11/22 0116)   dexamethasone PF (DECADRON) injection 10 mg (10 mg Intravenous Given 5/11/22 0116)   0.9% sodium chloride BOLUS (0 mLs Intravenous Stopped 5/11/22 0224)     2:22 AM Patient re-assessed: Patient feeling much better.  Has been resting comfortably.  Wife reports he has been snoring, usually wears a CPAP machine.  Patient awake and states he feels little foggy but otherwise is feeling noticeably improved.  Feels comfortable going home to sleep.      Assessments & Plan (with Medical Decision Making)  51-year-old male presenting for evaluation of progressive headache over the evening tonight with associated photophobia, nausea, and vomiting.  Headache is occipital with radiation up to his retro-orbital area in the midline on top of his head.  No neurologic symptoms other than photophobia.  No history of migraines however symptoms suspicious for migraine-like headache.  Treated with medications as above with substantial improvement in his headache.  He is able to rest comfortably with improved pain upon awakening.  Still with some mild residual headache which will likely need further sleep to allow for recovery.  No muscular tenderness in the neck or occipital area to suggest a tension type headache.  No neurologic symptoms to suggest intracranial pathology.  Recommended continued symptomatic treatment with return precautions if new or concerning symptoms develop overnight.     I have reviewed the nursing notes.    I have reviewed the findings, diagnosis, plan and need for follow up with the patient.       Discharge Medication List as of 5/11/2022  2:24 AM          Final diagnoses:   Acute nonintractable headache, unspecified headache type       5/11/2022   [facility] EMERGENCY DEPT     Admas, Tyson Wagoner MD  05/11/22 0246

## 2022-05-11 NOTE — ED TRIAGE NOTES
Patient reports severe headache with photophobia that started around 2100. Has also been vomiting since around 2100 as well.     Triage Assessment     Row Name 05/11/22 0036       Triage Assessment (Adult)    Airway WDL WDL       Respiratory WDL    Respiratory WDL WDL       Skin Circulation/Temperature WDL    Skin Circulation/Temperature WDL WDL       Cardiac WDL    Cardiac WDL WDL       Peripheral/Neurovascular WDL    Peripheral Neurovascular WDL WDL       Cognitive/Neuro/Behavioral WDL    Cognitive/Neuro/Behavioral WDL WDL

## 2022-05-27 ENCOUNTER — HOSPITAL ENCOUNTER (OUTPATIENT)
Dept: CARDIOLOGY | Facility: CLINIC | Age: 52
Discharge: HOME | End: 2022-05-27
Attending: INTERNAL MEDICINE | Admitting: INTERNAL MEDICINE
Payer: COMMERCIAL

## 2022-05-27 DIAGNOSIS — R06.09 DOE (DYSPNEA ON EXERTION): ICD-10-CM

## 2022-05-27 DIAGNOSIS — R00.2 PALPITATIONS: ICD-10-CM

## 2022-05-27 PROCEDURE — 255N000002 HC RX 255 OP 636: Performed by: INTERNAL MEDICINE

## 2022-05-27 PROCEDURE — 93321 DOPPLER ECHO F-UP/LMTD STD: CPT | Mod: 26 | Performed by: INTERNAL MEDICINE

## 2022-05-27 PROCEDURE — 93325 DOPPLER ECHO COLOR FLOW MAPG: CPT | Mod: 26 | Performed by: INTERNAL MEDICINE

## 2022-05-27 PROCEDURE — 93017 CV STRESS TEST TRACING ONLY: CPT

## 2022-05-27 PROCEDURE — 93018 CV STRESS TEST I&R ONLY: CPT | Performed by: INTERNAL MEDICINE

## 2022-05-27 PROCEDURE — 93016 CV STRESS TEST SUPVJ ONLY: CPT | Performed by: INTERNAL MEDICINE

## 2022-05-27 PROCEDURE — 93350 STRESS TTE ONLY: CPT | Mod: 26 | Performed by: INTERNAL MEDICINE

## 2022-05-27 PROCEDURE — 999N000208 ECHO STRESS ECHOCARDIOGRAM

## 2022-05-27 RX ADMIN — HUMAN ALBUMIN MICROSPHERES AND PERFLUTREN 6 ML: 10; .22 INJECTION, SOLUTION INTRAVENOUS at 09:41

## 2022-05-27 NOTE — LETTER
2022      Amarjit Knox  [address] [place] [place] [facility] [address]        Dear ,    We are writing to inform you of your test results.    The stress test is normal.  Recommend a 3 day heart monitor and follow-up in clinic after.    Resulted Orders   Echocardiogram Exercise Stress    Narrative    [id number]  [id number]  [id number]  [id number]^GUILLERMO^BRISEIDA^ESSENCE     [facility]  Echocardiography Laboratory  [address]  [place] [address]     Name: AMARJIT KNOX  MRN: [id number]  : 1970  Study Date: 2022 09:00 AM  Age: 51 yrs  Gender: Male  Patient Location: [facility]  Reason For Study: SEAY, Palpitations  Ordering Physician: BRISEIDA LI  Referring Physician: BRISEIDA LI  Performed By: Faye Perez     BSA: 2.8 m2  Height: 76 in  Weight: 350 lb  HR: 86  BP: 120/88 mmHg  Medications: LOSARTAN,ROSUVASTATIN  ______________________________________________________________________________  Procedure  Stress Echo Complete. Optison (NDC #[id number]) given intravenously.  ______________________________________________________________________________  Interpretation Summary  This was a normal stress echocardiogram with no evidence of stress-induced  ischemia.  This was a normal stress EKG with no evidence of stress-induced ischemia.  The Duke treadmill score was low risk ( >5 Bell score).  No hemodynamically significant valvular abnormalities on 2D or color flow  imaging.  ______________________________________________________________________________  Stress  The patient exercised 9:00.  The patient did not exhibit any symptoms during exercise.  Exercise was stopped due to fatigue.  There was a normal BP response to exercise.  Target Heart Rate was achieved.  A treadmill exercise test according to the Milton protocol was performed.  There were no ST segment changes observed with stress.  The Duke treadmill score was low risk ( >5 Duke score).  This was a normal  stress EKG with no evidence of stress-induced ischemia.  Left ventricular cavity size decreases with exercise.  The visual ejection fraction is >70%.  Global LV systolic function augments with exercise.  Normal left ventricular function and wall motion at rest and post-stress.  This was a normal stress echocardiogram with no evidence of stress-induced  ischemia.     Baseline  The patient is in normal sinus rhythm.  Normal left ventricular function and wall motion at rest.  The visual ejection fraction is estimated at 55-60%.     Stress Results             Protocol:  Milton          Maximum Predicted HR:   169 bpm             Target HR: 144 bpm        % Maximum Predicted HR: 96 %              Stage  DurationHeart Rate  BP              Comment                   (mm:ss)   (bpm)          Stage 1   3:00      115   140/84          Stage 2   3:00      126   152/84          Stage 3   3:00      162   174/76RPP: 28,188; FAC: AVG; DUKE: 9         RecoveryR  6:00      93    126/82            Stress Duration:   9:00 mm:ss *        Recovery Time: 6:00 mm:ss         Maximum Stress HR: 162 bpm             METS:          10     ______________________________________________________________________________  Report approved by: Jaxon Hansen 05/27/2022 03:33 PM     ______________________________________________________________________________          If you have any questions or concerns, please call the clinic at the number listed above.       Sincerely,      Ashley Crawley DO

## 2022-05-31 DIAGNOSIS — R00.2 PALPITATIONS: Primary | ICD-10-CM

## 2022-08-06 ENCOUNTER — MYC REFILL (OUTPATIENT)
Dept: FAMILY MEDICINE | Facility: CLINIC | Age: 52
End: 2022-08-06

## 2022-08-06 DIAGNOSIS — F41.1 GAD (GENERALIZED ANXIETY DISORDER): Chronic | ICD-10-CM

## 2022-08-06 DIAGNOSIS — R51.9 INTRACTABLE HEADACHE, UNSPECIFIED CHRONICITY PATTERN, UNSPECIFIED HEADACHE TYPE: ICD-10-CM

## 2022-08-06 DIAGNOSIS — K21.9 GASTROESOPHAGEAL REFLUX DISEASE WITHOUT ESOPHAGITIS: ICD-10-CM

## 2022-08-08 DIAGNOSIS — K21.9 GASTROESOPHAGEAL REFLUX DISEASE WITHOUT ESOPHAGITIS: ICD-10-CM

## 2022-08-08 DIAGNOSIS — F41.1 GAD (GENERALIZED ANXIETY DISORDER): Chronic | ICD-10-CM

## 2022-08-08 NOTE — TELEPHONE ENCOUNTER
Routing to ordering provider for consideration, not on refill protocol.           Pretty Schneider     RN MSN

## 2022-08-09 RX ORDER — PAROXETINE 40 MG/1
TABLET, FILM COATED ORAL
Qty: 90 TABLET | Refills: 3 | OUTPATIENT
Start: 2022-08-09

## 2022-08-09 RX ORDER — PAROXETINE 10 MG/1
TABLET, FILM COATED ORAL
Qty: 90 TABLET | Refills: 1 | Status: SHIPPED | OUTPATIENT
Start: 2022-08-09 | End: 2023-01-31

## 2022-08-09 RX ORDER — PANTOPRAZOLE SODIUM 40 MG/1
40 TABLET, DELAYED RELEASE ORAL DAILY
Qty: 90 TABLET | Refills: 1 | Status: SHIPPED | OUTPATIENT
Start: 2022-08-09 | End: 2023-01-31

## 2022-08-09 RX ORDER — PANTOPRAZOLE SODIUM 40 MG/1
TABLET, DELAYED RELEASE ORAL
Qty: 90 TABLET | Refills: 3 | OUTPATIENT
Start: 2022-08-09

## 2022-08-09 RX ORDER — PAROXETINE 40 MG/1
TABLET, FILM COATED ORAL
Qty: 90 TABLET | Refills: 1 | Status: SHIPPED | OUTPATIENT
Start: 2022-08-09 | End: 2023-01-31

## 2022-08-09 RX ORDER — PAROXETINE 10 MG/1
TABLET, FILM COATED ORAL
Qty: 90 TABLET | Refills: 3 | OUTPATIENT
Start: 2022-08-09

## 2022-08-09 RX ORDER — HYDROXYZINE HYDROCHLORIDE 25 MG/1
TABLET, FILM COATED ORAL
Qty: 30 TABLET | Refills: 3 | Status: SHIPPED | OUTPATIENT
Start: 2022-08-09 | End: 2023-01-31

## 2022-09-10 DIAGNOSIS — M54.50 CHRONIC LOW BACK PAIN, UNSPECIFIED BACK PAIN LATERALITY, UNSPECIFIED WHETHER SCIATICA PRESENT: ICD-10-CM

## 2022-09-10 DIAGNOSIS — G89.29 CHRONIC LOW BACK PAIN, UNSPECIFIED BACK PAIN LATERALITY, UNSPECIFIED WHETHER SCIATICA PRESENT: ICD-10-CM

## 2022-09-13 NOTE — TELEPHONE ENCOUNTER
Pending Prescriptions:                       Disp   Refills    diclofenac (VOLTAREN) 50 MG EC tablet [Pha*60 tab*11       Sig: TAKE ONE TABLET BY MOUTH TWICE A DAY AS NEEDED FOR           MODERATE PAIN    Routing refill request to provider for review/approval because:  BP not in range for RN refill protocol, routing to PCP for review.    BP Readings from Last 3 Encounters:   05/11/22 (!) 147/112   04/07/22 120/84   07/02/21 130/78     Sindi Mari RN  [facility]

## 2022-09-14 NOTE — TELEPHONE ENCOUNTER
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 Regina Khan on 9/14/2022 at 10:17 AM

## 2022-11-01 DIAGNOSIS — G47.33 OBSTRUCTIVE SLEEP APNEA: Primary | ICD-10-CM

## 2022-11-06 DIAGNOSIS — I10 BENIGN ESSENTIAL HYPERTENSION: Primary | Chronic | ICD-10-CM

## 2022-11-06 DIAGNOSIS — E29.1 HYPOGONADISM MALE: ICD-10-CM

## 2022-11-08 RX ORDER — TESTOSTERONE CYPIONATE 200 MG/ML
100 INJECTION, SOLUTION INTRAMUSCULAR
Qty: 3 ML | Refills: 5 | Status: SHIPPED | OUTPATIENT
Start: 2022-11-08 | End: 2023-02-21

## 2022-11-19 ENCOUNTER — HEALTH MAINTENANCE LETTER (OUTPATIENT)
Age: 52
End: 2022-11-19

## 2023-01-29 DIAGNOSIS — K21.9 GASTROESOPHAGEAL REFLUX DISEASE WITHOUT ESOPHAGITIS: ICD-10-CM

## 2023-01-29 DIAGNOSIS — F41.1 GAD (GENERALIZED ANXIETY DISORDER): Chronic | ICD-10-CM

## 2023-01-31 RX ORDER — PANTOPRAZOLE SODIUM 40 MG/1
TABLET, DELAYED RELEASE ORAL
Qty: 90 TABLET | Refills: 0 | Status: SHIPPED | OUTPATIENT
Start: 2023-01-31 | End: 2023-02-21

## 2023-01-31 RX ORDER — PAROXETINE 10 MG/1
TABLET, FILM COATED ORAL
Qty: 90 TABLET | Refills: 0 | Status: SHIPPED | OUTPATIENT
Start: 2023-01-31 | End: 2023-02-21

## 2023-01-31 RX ORDER — HYDROXYZINE HYDROCHLORIDE 25 MG/1
TABLET, FILM COATED ORAL
Qty: 30 TABLET | Refills: 2 | Status: SHIPPED | OUTPATIENT
Start: 2023-01-31 | End: 2023-02-21

## 2023-01-31 RX ORDER — PAROXETINE 40 MG/1
TABLET, FILM COATED ORAL
Qty: 90 TABLET | Refills: 0 | Status: SHIPPED | OUTPATIENT
Start: 2023-01-31 | End: 2023-02-21

## 2023-01-31 NOTE — TELEPHONE ENCOUNTER
Pending Prescriptions:                       Disp   Refills    hydrOXYzine (ATARAX) 25 MG tablet [Pharma*30 tab*2            Sig: TAKE ONE TO TWO TABLETS BY MOUTH UP TO THREE           TIMES DAILY AS NEEDED AND TWO TO FOUR TABLETS BY           MOUTH AT NIGHT FOR SLEEP AND ANXIETY    PARoxetine (PAXIL) 10 MG tablet [Pharmacy*90 tab*0            Sig: TAKE ONE TABLET BY MOUTH EVERY MORNING TAKE IN           ADDITION THE THE ONE 40MG TABLET FOR A TOTAL DOSE           OF 50MG    pantoprazole (PROTONIX) 40 MG EC tablet [*90 tab*0            Sig: TAKE ONE TABLET BY MOUTH ONCE DAILY    PARoxetine (PAXIL) 40 MG tablet [Pharmacy*90 tab*0            Sig: TAKE ONE TABLET BY MOUTH EVERY NIGHT AT BEDTIME    Routing refill request to provider for review/approval because:  Drug interaction warning      Jerry De La Torre, RN

## 2023-02-21 ENCOUNTER — TELEPHONE (OUTPATIENT)
Dept: FAMILY MEDICINE | Facility: CLINIC | Age: 53
End: 2023-02-21

## 2023-02-21 ENCOUNTER — OFFICE VISIT (OUTPATIENT)
Dept: FAMILY MEDICINE | Facility: CLINIC | Age: 53
End: 2023-02-21
Payer: COMMERCIAL

## 2023-02-21 VITALS
TEMPERATURE: 96.9 F | BODY MASS INDEX: 38.36 KG/M2 | SYSTOLIC BLOOD PRESSURE: 142 MMHG | WEIGHT: 315 LBS | HEIGHT: 76 IN | OXYGEN SATURATION: 94 % | HEART RATE: 80 BPM | DIASTOLIC BLOOD PRESSURE: 104 MMHG

## 2023-02-21 DIAGNOSIS — G89.29 CHRONIC LOW BACK PAIN, UNSPECIFIED BACK PAIN LATERALITY, UNSPECIFIED WHETHER SCIATICA PRESENT: Primary | ICD-10-CM

## 2023-02-21 DIAGNOSIS — F41.1 GAD (GENERALIZED ANXIETY DISORDER): Chronic | ICD-10-CM

## 2023-02-21 DIAGNOSIS — E78.5 HYPERLIPIDEMIA LDL GOAL <130: ICD-10-CM

## 2023-02-21 DIAGNOSIS — K21.9 GASTROESOPHAGEAL REFLUX DISEASE WITHOUT ESOPHAGITIS: ICD-10-CM

## 2023-02-21 DIAGNOSIS — I10 ESSENTIAL HYPERTENSION: ICD-10-CM

## 2023-02-21 DIAGNOSIS — D17.30 LIPOMA OF SKIN AND SUBCUTANEOUS TISSUE: ICD-10-CM

## 2023-02-21 DIAGNOSIS — E66.01 MORBID OBESITY (H): ICD-10-CM

## 2023-02-21 DIAGNOSIS — E29.1 HYPOGONADISM MALE: ICD-10-CM

## 2023-02-21 DIAGNOSIS — Z77.018 HEAVY METAL EXPOSURE: ICD-10-CM

## 2023-02-21 DIAGNOSIS — M54.50 CHRONIC LOW BACK PAIN, UNSPECIFIED BACK PAIN LATERALITY, UNSPECIFIED WHETHER SCIATICA PRESENT: Primary | ICD-10-CM

## 2023-02-21 DIAGNOSIS — R06.09 DOE (DYSPNEA ON EXERTION): ICD-10-CM

## 2023-02-21 LAB
ALBUMIN SERPL BCG-MCNC: 4.2 G/DL (ref 3.5–5.2)
ALP SERPL-CCNC: 79 U/L (ref 40–129)
ALT SERPL W P-5'-P-CCNC: 42 U/L (ref 10–50)
ANION GAP SERPL CALCULATED.3IONS-SCNC: 11 MMOL/L (ref 7–15)
AST SERPL W P-5'-P-CCNC: 32 U/L (ref 10–50)
BILIRUB SERPL-MCNC: 0.6 MG/DL
BUN SERPL-MCNC: 16.5 MG/DL (ref 6–20)
CALCIUM SERPL-MCNC: 9.4 MG/DL (ref 8.6–10)
CHLORIDE SERPL-SCNC: 104 MMOL/L (ref 98–107)
CHOLEST SERPL-MCNC: 156 MG/DL
CREAT SERPL-MCNC: 0.96 MG/DL (ref 0.67–1.17)
DEPRECATED HCO3 PLAS-SCNC: 25 MMOL/L (ref 22–29)
ERYTHROCYTE [DISTWIDTH] IN BLOOD BY AUTOMATED COUNT: 13.1 % (ref 10–15)
GFR SERPL CREATININE-BSD FRML MDRD: >90 ML/MIN/1.73M2
GLUCOSE SERPL-MCNC: 103 MG/DL (ref 70–99)
HCT VFR BLD AUTO: 46.3 % (ref 40–53)
HDLC SERPL-MCNC: 43 MG/DL
HGB BLD-MCNC: 15.3 G/DL (ref 13.3–17.7)
LDLC SERPL CALC-MCNC: 94 MG/DL
MCH RBC QN AUTO: 29.1 PG (ref 26.5–33)
MCHC RBC AUTO-ENTMCNC: 33 G/DL (ref 31.5–36.5)
MCV RBC AUTO: 88 FL (ref 78–100)
NONHDLC SERPL-MCNC: 113 MG/DL
PLATELET # BLD AUTO: 228 10E3/UL (ref 150–450)
POTASSIUM SERPL-SCNC: 4.3 MMOL/L (ref 3.4–5.3)
PROT SERPL-MCNC: 7.5 G/DL (ref 6.4–8.3)
PSA SERPL-MCNC: 0.53 NG/ML (ref 0–3.5)
RBC # BLD AUTO: 5.26 10E6/UL (ref 4.4–5.9)
SODIUM SERPL-SCNC: 140 MMOL/L (ref 136–145)
TRIGL SERPL-MCNC: 97 MG/DL
WBC # BLD AUTO: 5.9 10E3/UL (ref 4–11)

## 2023-02-21 PROCEDURE — 84403 ASSAY OF TOTAL TESTOSTERONE: CPT | Performed by: INTERNAL MEDICINE

## 2023-02-21 PROCEDURE — 85027 COMPLETE CBC AUTOMATED: CPT | Performed by: INTERNAL MEDICINE

## 2023-02-21 PROCEDURE — 83655 ASSAY OF LEAD: CPT | Mod: 90 | Performed by: INTERNAL MEDICINE

## 2023-02-21 PROCEDURE — 99214 OFFICE O/P EST MOD 30 MIN: CPT | Performed by: INTERNAL MEDICINE

## 2023-02-21 PROCEDURE — 36415 COLL VENOUS BLD VENIPUNCTURE: CPT | Performed by: INTERNAL MEDICINE

## 2023-02-21 PROCEDURE — 80053 COMPREHEN METABOLIC PANEL: CPT | Performed by: INTERNAL MEDICINE

## 2023-02-21 PROCEDURE — G0103 PSA SCREENING: HCPCS | Performed by: INTERNAL MEDICINE

## 2023-02-21 PROCEDURE — 99000 SPECIMEN HANDLING OFFICE-LAB: CPT | Performed by: INTERNAL MEDICINE

## 2023-02-21 PROCEDURE — 80061 LIPID PANEL: CPT | Performed by: INTERNAL MEDICINE

## 2023-02-21 RX ORDER — LOSARTAN POTASSIUM 100 MG/1
100 TABLET ORAL DAILY
Qty: 90 TABLET | Refills: 3 | Status: SHIPPED | OUTPATIENT
Start: 2023-02-21 | End: 2023-07-21

## 2023-02-21 RX ORDER — HYDROXYZINE HYDROCHLORIDE 25 MG/1
TABLET, FILM COATED ORAL
Qty: 30 TABLET | Refills: 11 | Status: SHIPPED | OUTPATIENT
Start: 2023-02-21 | End: 2024-03-15

## 2023-02-21 RX ORDER — TESTOSTERONE CYPIONATE 200 MG/ML
100 INJECTION, SOLUTION INTRAMUSCULAR
Qty: 3 ML | Refills: 5 | Status: SHIPPED | OUTPATIENT
Start: 2023-02-21 | End: 2023-09-26

## 2023-02-21 RX ORDER — HYDROCHLOROTHIAZIDE 12.5 MG/1
12.5 TABLET ORAL DAILY
Qty: 90 TABLET | Refills: 3 | Status: SHIPPED | OUTPATIENT
Start: 2023-02-21 | End: 2023-07-21

## 2023-02-21 RX ORDER — ROSUVASTATIN CALCIUM 10 MG/1
10 TABLET, COATED ORAL DAILY
Qty: 90 TABLET | Refills: 3 | Status: SHIPPED | OUTPATIENT
Start: 2023-02-21 | End: 2023-07-21

## 2023-02-21 RX ORDER — PAROXETINE 40 MG/1
40 TABLET, FILM COATED ORAL AT BEDTIME
Qty: 90 TABLET | Refills: 3 | Status: SHIPPED | OUTPATIENT
Start: 2023-02-21 | End: 2024-03-15

## 2023-02-21 RX ORDER — PANTOPRAZOLE SODIUM 40 MG/1
40 TABLET, DELAYED RELEASE ORAL DAILY
Qty: 90 TABLET | Refills: 3 | Status: SHIPPED | OUTPATIENT
Start: 2023-02-21 | End: 2024-03-15

## 2023-02-21 RX ORDER — PAROXETINE 10 MG/1
TABLET, FILM COATED ORAL
Qty: 90 TABLET | Refills: 3 | Status: SHIPPED | OUTPATIENT
Start: 2023-02-21 | End: 2024-03-15

## 2023-02-21 ASSESSMENT — ANXIETY QUESTIONNAIRES
3. WORRYING TOO MUCH ABOUT DIFFERENT THINGS: NOT AT ALL
2. NOT BEING ABLE TO STOP OR CONTROL WORRYING: NOT AT ALL
IF YOU CHECKED OFF ANY PROBLEMS ON THIS QUESTIONNAIRE, HOW DIFFICULT HAVE THESE PROBLEMS MADE IT FOR YOU TO DO YOUR WORK, TAKE CARE OF THINGS AT HOME, OR GET ALONG WITH OTHER PEOPLE: NOT DIFFICULT AT ALL
6. BECOMING EASILY ANNOYED OR IRRITABLE: SEVERAL DAYS
1. FEELING NERVOUS, ANXIOUS, OR ON EDGE: NOT AT ALL
7. FEELING AFRAID AS IF SOMETHING AWFUL MIGHT HAPPEN: NOT AT ALL
GAD7 TOTAL SCORE: 2
5. BEING SO RESTLESS THAT IT IS HARD TO SIT STILL: NOT AT ALL
GAD7 TOTAL SCORE: 2

## 2023-02-21 ASSESSMENT — PAIN SCALES - GENERAL: PAINLEVEL: NO PAIN (0)

## 2023-02-21 ASSESSMENT — PATIENT HEALTH QUESTIONNAIRE - PHQ9: 5. POOR APPETITE OR OVEREATING: SEVERAL DAYS

## 2023-02-21 NOTE — TELEPHONE ENCOUNTER
Patient Quality Outreach    Patient is due for the following:   Hypertension -  BP check    Next Steps:   Schedule a nurse only visit for BP check     Type of outreach:    Sent letter.      Questions for provider review:    None     Chasity Self MA

## 2023-02-21 NOTE — TELEPHONE ENCOUNTER
Prior Authorization Retail Medication Request    Medication/Dose: Testosterone  ICD code (if different than what is on RX):  E291  Previously Tried and Failed:    Rationale:      Insurance Name:  Whi  Insurance ID:  [id number]        See Arben  Pharmacy Technician, [facility]  Phone: [phone]  Fax: [phone]

## 2023-02-21 NOTE — PROGRESS NOTES
Assessment & Plan     Chronic low back pain, unspecified back pain laterality, unspecified whether sciatica present  - stable, refill provided  - diclofenac (VOLTAREN) 50 MG EC tablet; Take 1 tablet (50 mg) by mouth 2 times daily as needed for moderate pain (4-6)    ERIC (generalized anxiety disorder) - stable, refill provided  - hydrOXYzine (ATARAX) 25 MG tablet; TAKE ONE TO TWO TABLETS BY MOUTH UP TO THREE TIMES DAILY AS NEEDED AND TWO TO FOUR TABLETS BY MOUTH AT NIGHT FOR SLEEP AND ANXIETY  - PARoxetine (PAXIL) 10 MG tablet; TAKE ONE TABLET BY MOUTH EVERY MORNING TAKE IN ADDITION THE THE ONE 40MG TABLET FOR A TOTAL DOSE OF 50MG  - PARoxetine (PAXIL) 40 MG tablet; Take 1 tablet (40 mg) by mouth At Bedtime    Essential hypertension -uncontrolled.  Add HCTZ.  RN BP check and BMP 2 weeks  - losartan (COZAAR) 100 MG tablet; Take 1 tablet (100 mg) by mouth daily  - hydrochlorothiazide (HYDRODIURIL) 12.5 MG tablet; Take 1 tablet (12.5 mg) by mouth daily    Hyperlipidemia LDL goal <130  - losartan (COZAAR) 100 MG tablet; Take 1 tablet (100 mg) by mouth daily  - rosuvastatin (CRESTOR) 10 MG tablet; Take 1 tablet (10 mg) by mouth daily  - Lipid panel reflex to direct LDL Fasting; Future  - Lipid panel reflex to direct LDL Fasting    Gastroesophageal reflux disease without esophagitis -failed trial off PPI in the past  - pantoprazole (PROTONIX) 40 MG EC tablet; Take 1 tablet (40 mg) by mouth daily    Hypogonadism male -due for labs, refill sent  - testosterone cypionate (DEPOTESTOSTERONE) 200 MG/ML injection; Inject 0.5 mLs (100 mg) into the muscle every 14 days  - Testosterone total; Future  - PSA, screen; Future  - CBC with platelets; Future  - Comprehensive metabolic panel (BMP + Alb, Alk Phos, ALT, AST, Total. Bili, TP); Future  - **Basic metabolic panel FUTURE 14d; Future  - Testosterone total  - PSA, screen  - CBC with platelets  - Comprehensive metabolic panel (BMP + Alb, Alk Phos, ALT, AST, Total. Bili,  "TP)    Morbid obesity (H) -have discussed diet and exercise at previous visits    Heavy metal exposure - patient would like screening  - Lead, Venous Blood Confirmation; Future  - Heavy Metals Urine with Reflex to Arsenic Fractionated Urine; Future  - Lead, Venous Blood Confirmation  - Heavy Metals Urine with Reflex to Arsenic Fractionated Urine    Lipoma of skin and subcutaneous tissue - referral for definitive treatment  - Adult General Surg Referral; Future    SEAY (dyspnea on exertion) -if 3-day monitor is normal, recommend to increase cardiovascular fitness and if symptoms do not improve then would pursue further testing  - Adult Leadless EKG Monitor 3 to 7 Days; Future             BMI:   Estimated body mass index is 40.92 kg/m  as calculated from the following:    Height as of this encounter: 1.93 m (6' 3.98\").    Weight as of this encounter: 152.4 kg (336 lb).       Patient Instructions   Hypertension  1. Continue losartan  2. Start hydrochlorothiazide 12.5 mg once daily  3. Watch for increase in urination  4. RN blood pressure check ~ 2 weeks, BMP     Low T  1. Refill sent  2. Blood work today    Lipoma  1. Referral sent    Shortness of breath  1. 3 day monitor ordered  2.  If test is normal, would recommend to increase CV exertion; if symptoms worsen/do not improve, then follow-up for next step        No follow-ups on file.    Ashley Crawley DO  [facility]    Corrina Ocasio is a 52 year old accompanied by his self, presenting for the following health issues:  Hypertension, Lipids, Anxiety, and Health Maintenance (Due for covid shingles /Will call about shingles )      History of Present Illness       Reason for visit:  Med refill    He eats 0-1 servings of fruits and vegetables daily.He consumes 1 sweetened beverage(s) daily.He exercises with enough effort to increase his heart rate 9 or less minutes per day.  He exercises with enough effort to increase his heart rate 3 or less " days per week.   He is taking medications regularly.         Chief Complaint   Patient presents with     Hypertension     Lipids     Anxiety     Health Maintenance     Due for covid shingles   Will call about matthieu          Hyperlipidemia Follow-Up      Are you regularly taking any medication or supplement to lower your cholesterol?   Yes- PM    Are you having muscle aches or other side effects that you think could be caused by your cholesterol lowering medication?  No    Hypertension Follow-up      Do you check your blood pressure regularly outside of the clinic? Yes     Are you following a low salt diet? No    Are your blood pressures ever more than 140 on the top number (systolic) OR more than 90 on the bottom number (diastolic), for example 140/90? No     Blood pressure running 140s when check at work    Losartan 100 mg    Has been on atenolol - stopped due to concern about develop diabetes - he tolerated well    Anxiety Follow-Up    How are you doing with your anxiety since your last visit? No change    Are you having other symptoms that might be associated with anxiety? Yes:  life, work    Have you had a significant life event? No     Are you feeling depressed? No    Do you have any concerns with your use of alcohol or other drugs? No     Low T:   --helps with mood, energy level  --has 1-2 nocturia;  Ongoing for years.  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    lipoma  --wants to have removal    Shortness of breath  --did not complete zio patch  --has ongoing symptoms, and occ palpitations    Social History     Tobacco Use     Smoking status: Never     Smokeless tobacco: Never   Vaping Use     Vaping Use: Never used   Substance Use Topics     Alcohol use: No     Comment: rare     Drug use: No     ERIC-7 SCORE 7/13/2020 8/11/2020 2/21/2023   Total Score - - -   Total Score 1 (minimal anxiety) - -   Total Score 1 4 2     PHQ  7/31/2018 7/13/2020 8/11/2020   PHQ-9 Total Score 3 2 3   Q9: Thoughts of better off dead/self-harm past 2 weeks Not at all Not at all Not at all     ERIC-7  2/21/2023   1. Feeling nervous, anxious, or on edge 0   2. Not being able to stop or control worrying 0   3. Worrying too much about different things 0   4. Trouble relaxing 1   5. Being so restless that it is hard to sit still 0   6. Becoming easily annoyed or irritable 1   7. Feeling afraid, as if something awful might happen 0   ERIC-7 Total Score 2   If you checked any problems, how difficult have they made it for you to do your work, take care of things at home, or get along with other people? Not difficult at all         How many servings of fruits and vegetables do you eat daily?  0-1    On average, how many sweetened beverages do you drink each day (Examples: soda, juice, sweet tea, etc.  Do NOT count diet or artificially sweetened beverages)?   1    How many days per week do you exercise enough to make your heart beat faster? 3 or less    How many minutes a day do you exercise enough to make your heart beat faster? 9 or less    How many days per week do you miss taking your medication? 0        Current Outpatient Medications   Medication Sig Dispense Refill     diclofenac (VOLTAREN) 50 MG EC tablet Take 1 tablet (50 mg) by mouth 2 times daily as needed for moderate pain (4-6) 180 tablet 3     hydrochlorothiazide (HYDRODIURIL) 12.5 MG tablet Take 1 tablet (12.5 mg) by mouth daily 90 tablet 3     hydrOXYzine (ATARAX) 25 MG tablet TAKE ONE TO TWO TABLETS BY MOUTH UP TO THREE TIMES DAILY AS NEEDED AND TWO TO FOUR TABLETS BY MOUTH AT NIGHT FOR SLEEP AND ANXIETY 30 tablet 11     losartan (COZAAR) 100 MG tablet Take 1 tablet (100 mg) by mouth daily 90 tablet 3     ORDER FOR DME Auto-CPAP:  Max 15 cm H2O  Min 8 cm H2O    Continuous    Lifetime need and heated humidity.    1 each 0     pantoprazole (PROTONIX) 40 MG EC tablet Take 1 tablet (40 mg) by mouth daily  "90 tablet 3     PARoxetine (PAXIL) 10 MG tablet TAKE ONE TABLET BY MOUTH EVERY MORNING TAKE IN ADDITION THE THE ONE 40MG TABLET FOR A TOTAL DOSE OF 50MG 90 tablet 3     PARoxetine (PAXIL) 40 MG tablet Take 1 tablet (40 mg) by mouth At Bedtime 90 tablet 3     rosuvastatin (CRESTOR) 10 MG tablet Take 1 tablet (10 mg) by mouth daily 90 tablet 3     testosterone cypionate (DEPOTESTOSTERONE) 200 MG/ML injection Inject 0.5 mLs (100 mg) into the muscle every 14 days 3 mL 5     tiZANidine (ZANAFLEX) 4 MG tablet Take 1 tablet (4 mg) by mouth 3 times daily 21 tablet 0       Review of Systems   Constitutional, HEENT, cardiovascular, pulmonary, gi and gu systems are negative, except as otherwise noted.      Objective    BP (!) 142/104 (BP Location: Left arm)   Pulse 80   Temp 96.9  F (36.1  C) (Tympanic)   Ht 1.93 m (6' 3.98\")   Wt (!) 152.4 kg (336 lb)   SpO2 94%   BMI 40.92 kg/m    Body mass index is 40.92 kg/m .  Physical Exam   GENERAL: healthy, alert, no distress and over weight  EYES: Eyes grossly normal to inspection, PERRL and conjunctivae and sclerae normal  HENT: ear canals and TM's normal, nose and mouth without ulcers or lesions  NECK: no adenopathy, no asymmetry, masses, or scars and thyroid normal to palpation  RESP: lungs clear to auscultation - no rales, rhonchi or wheezes  CV: regular rate and rhythm, normal S1 S2, no S3 or S4, no murmur, click or rub, no peripheral edema and peripheral pulses strong  ABDOMEN: soft, nontender, no hepatosplenomegaly, no masses and bowel sounds normal  MS: no gross musculoskeletal defects noted, no edema  SKIN: no suspicious lesions or rashes  NEURO: Normal strength and tone, mentation intact and speech normal  PSYCH: mentation appears normal, affect normal/bright                    "

## 2023-02-21 NOTE — PATIENT INSTRUCTIONS
Hypertension  Continue losartan  Start hydrochlorothiazide 12.5 mg once daily  Watch for increase in urination  RN blood pressure check ~ 2 weeks, BMP     Low T  Refill sent  Blood work today    Lipoma  Referral sent    Shortness of breath  3 day monitor ordered   If test is normal, would recommend to increase CV exertion; if symptoms worsen/do not improve, then follow-up for next step

## 2023-02-21 NOTE — RESULT ENCOUNTER NOTE
Cholesterol is well controlled.  PSA is normal.  Kidney function, electrolytes, blood counts are normal.  Fasting blood sugar is minimally elevated; we will recheck in 1 year

## 2023-02-21 NOTE — LETTER
February 28, 2023      Amarjit Malik  [address] [address]        Dear ,    We are writing to inform you of your test results.    Labs came back in the acceptable range except for the fasting blood sugar is minimally elevated; we will recheck in 1 year.    Resulted Orders   Testosterone total   Result Value Ref Range    Testosterone Total 813 240 - 950 ng/dL   PSA, screen   Result Value Ref Range    Prostate Specific Antigen Screen 0.53 0.00 - 3.50 ng/mL    Narrative    This result is obtained using the Roche Elecsys total PSA method on the piero e601 immunoassay analyzer. Results obtained with different assay methods or kits cannot be used interchangeably.   Lipid panel reflex to direct LDL Fasting   Result Value Ref Range    Cholesterol 156 <200 mg/dL    Triglycerides 97 <150 mg/dL    Direct Measure HDL 43 >=40 mg/dL    LDL Cholesterol Calculated 94 <=100 mg/dL    Non HDL Cholesterol 113 <130 mg/dL    Narrative    Cholesterol  Desirable:  <200 mg/dL    Triglycerides  Normal:  Less than 150 mg/dL  Borderline High:  150-199 mg/dL  High:  200-499 mg/dL  Very High:  Greater than or equal to 500 mg/dL    Direct Measure HDL  Female:  Greater than or equal to 50 mg/dL   Male:  Greater than or equal to 40 mg/dL    LDL Cholesterol  Desirable:  <100mg/dL  Above Desirable:  100-129 mg/dL   Borderline High:  130-159 mg/dL   High:  160-189 mg/dL   Very High:  >= 190 mg/dL    Non HDL Cholesterol  Desirable:  130 mg/dL  Above Desirable:  130-159 mg/dL  Borderline High:  160-189 mg/dL  High:  190-219 mg/dL  Very High:  Greater than or equal to 220 mg/dL   CBC with platelets   Result Value Ref Range    WBC Count 5.9 4.0 - 11.0 10e3/uL    RBC Count 5.26 4.40 - 5.90 10e6/uL    Hemoglobin 15.3 13.3 - 17.7 g/dL    Hematocrit 46.3 40.0 - 53.0 %    MCV 88 78 - 100 fL    MCH 29.1 26.5 - 33.0 pg    MCHC 33.0 31.5 - 36.5 g/dL    RDW 13.1 10.0 - 15.0 %    Platelet Count 228 150 - 450 10e3/uL   Comprehensive  "metabolic panel (BMP + Alb, Alk Phos, ALT, AST, Total. Bili, TP)   Result Value Ref Range    Sodium 140 136 - 145 mmol/L    Potassium 4.3 3.4 - 5.3 mmol/L    Chloride 104 98 - 107 mmol/L    Carbon Dioxide (CO2) 25 22 - 29 mmol/L    Anion Gap 11 7 - 15 mmol/L    Urea Nitrogen 16.5 6.0 - 20.0 mg/dL    Creatinine 0.96 0.67 - 1.17 mg/dL    Calcium 9.4 8.6 - 10.0 mg/dL    Glucose 103 (H) 70 - 99 mg/dL    Alkaline Phosphatase 79 40 - 129 U/L    AST 32 10 - 50 U/L    ALT 42 10 - 50 U/L    Protein Total 7.5 6.4 - 8.3 g/dL    Albumin 4.2 3.5 - 5.2 g/dL    Bilirubin Total 0.6 <=1.2 mg/dL    GFR Estimate >90 >60 mL/min/1.73m2      Comment:      eGFR calculated using 2021 CKD-EPI equation.   Lead, Venous Blood Confirmation   Result Value Ref Range    Lead Venous Blood <2.0 <=4.9 ug/dL      Comment:      INTERPRETIVE INFORMATION: Lead, Blood (Venous)    Analysis performed by Inductively Coupled Plasma-Mass   Spectrometry (ICP-MS).    Elevated results may be due to skin or collection-related   contamination, including the use of a noncertified   lead-free tube. If contamination concerns exist due to   elevated levels of blood lead, confirmation with a second   specimen collected in a certified lead-free tube is   recommended.    Information sources for blood lead reference intervals and   interpretive comments include the CDC's \"Childhood Lead   Poisoning Prevention: Recommended Actions Based on Blood   Lead Level\" and the \"Adult Blood Lead Epidemiology and   Surveillance: Reference Blood Lead Levels (BLLs) for Adults   in the [place].\" Thresholds and time intervals for retesting,   medical evaluation, and response vary by state and   regulatory body. Contact your State Department of Health   and/or applicable regulatory agency for specific guidance   on medical management  recommendations.    This test was developed and its performance characteristics   determined by [employer]. It has not been cleared or   approved by the " U.S. Food and Drug Administration. This   test was performed in a CLIA-certified laboratory and is   intended for clinical purposes.         Group          Concentration   Comment    Children       3.5-19.9 ug/dL  Children under the age of 6                                 years are the most vulnerable                                 to the harmful effects of                                  lead exposure. Environmental                                  investigation and exposure                                  history to identify potential                                 sources of lead. Biological                                  and nutritional monitoring                                 are recommended. Follow-up                                  blood lead monitoring is                                  recommended.                                 20-44.9 ug/dL   Lead hazard reduction and                                  prompt medical evaluation are                                 recommended. Contact a                                  Pediatric Environmental                                  Health Specialty Unit or                                  poison control center for                                  guidance.                   Greater than    Critical. Immediate medical                  44.9 ug/dL      evaluation, including                                  detailed neurological exam is                                 recommended. Consider                                  chelation therapy when                                 symptoms of lead toxicity   are                                  present. Contact a Pediatric                                  Environmental Health                                  Specialty Unit or poison                                  control center for                                   assistance.    Adult          5-19.9 ug/dL    Medical removal is                                   recommended for pregnant                                  women or those who are trying                                 or may become pregnant.                                  Adverse health effects are                                  possible. Reduced lead                                  exposure and increased blood                                  lead monitoring are                                  recommended.                    20-69.9 ug/dL   Adverse health effects are                                  indicated. Medical removal                                  from lead exposure is                                  required by OSHA if blood                                  lead level exceeds 50 ug/dL.                                 Prompt medical evaluation is                                 recommended.                    Greater than    Critical. Immediate medical                   69.9 ug/dL      evaluation is recommended.                                  Consider chelation therapy                                 when symptoms of lead                                  toxicity are present.  Performed By: [facility]  [address] [place] [id number]  : Darren Harp MD, PhD       If you have any questions or concerns, please call the clinic at the number listed above.       Sincerely,      Ashley Crawley, DO

## 2023-02-23 LAB
LEAD BLDV-MCNC: <2 UG/DL
TESTOST SERPL-MCNC: 813 NG/DL (ref 240–950)

## 2023-02-24 NOTE — TELEPHONE ENCOUNTER
Prior Authorization Approval    Authorization Effective Date: 2/24/2023  Authorization Expiration Date: 2/23/2024  Medication: Testosterone PA APPROVED  Approved Dose/Quantity:   Reference #:     Insurance Company: [employer] - Phone [phone] Fax [phone]  Expected CoPay:       CoPay Card Available:      Foundation Assistance Needed:    Which Pharmacy is filling the prescription (Not needed for infusion/clinic administered): [facility] [place], [place] - [address]  Pharmacy Notified: Yes  Patient Notified: Comment:  Pharmacy will notify patient.

## 2023-02-24 NOTE — TELEPHONE ENCOUNTER
PA Initiation    Medication: Testosterone PA INITIATED  Insurance Company: [employer] - Phone [phone] Fax [phone]  Pharmacy Filling the Rx: [facility] - [id number]  Filling Pharmacy Phone: [phone]  Filling Pharmacy Fax:    Start Date: 2/24/2023    Central Prior Authorization Team   Phone: [phone]

## 2023-04-20 ENCOUNTER — PATIENT OUTREACH (OUTPATIENT)
Dept: CARE COORDINATION | Facility: CLINIC | Age: 53
End: 2023-04-20
Payer: COMMERCIAL

## 2023-04-24 NOTE — PATIENT INSTRUCTIONS
1. Check with insurance about colon cancer screening.  2. Labs today and med refills  
Health Care Proxy (HCP)

## 2023-05-15 ENCOUNTER — HOSPITAL ENCOUNTER (OUTPATIENT)
Dept: CARDIOLOGY | Facility: CLINIC | Age: 53
Discharge: HOME | End: 2023-05-15
Attending: INTERNAL MEDICINE | Admitting: INTERNAL MEDICINE
Payer: COMMERCIAL

## 2023-05-15 DIAGNOSIS — R06.09 DOE (DYSPNEA ON EXERTION): ICD-10-CM

## 2023-05-15 PROCEDURE — 93242 EXT ECG>48HR<7D RECORDING: CPT

## 2023-05-15 PROCEDURE — 93244 EXT ECG>48HR<7D REV&INTERPJ: CPT | Performed by: INTERNAL MEDICINE

## 2023-06-01 ENCOUNTER — HEALTH MAINTENANCE LETTER (OUTPATIENT)
Age: 53
End: 2023-06-01

## 2023-06-06 DIAGNOSIS — I47.20 V-TACH (H): Primary | ICD-10-CM

## 2023-06-06 NOTE — RESULT ENCOUNTER NOTE
The monitor shows a 13 beat run of an abnormal rhythm called V-tach.  Sometimes this is associated with a scar on the heart from a silent/previous heart attack. Sometimes healthy hearts can have this rhythm. You had a normal stress test 1 year ago. Recommend to see Cardiology, referral placed.  Next available in a few weeks is fine    The times you triggered the monitor corresponded to either normal sinus rhythm or sinus tachycardia so that is reassuring.

## 2023-06-12 ENCOUNTER — OFFICE VISIT (OUTPATIENT)
Dept: CARDIOLOGY | Facility: CLINIC | Age: 53
End: 2023-06-12
Attending: INTERNAL MEDICINE
Payer: COMMERCIAL

## 2023-06-12 VITALS
WEIGHT: 315 LBS | HEART RATE: 83 BPM | SYSTOLIC BLOOD PRESSURE: 142 MMHG | HEIGHT: 76 IN | DIASTOLIC BLOOD PRESSURE: 95 MMHG | BODY MASS INDEX: 38.36 KG/M2

## 2023-06-12 DIAGNOSIS — I47.20 V-TACH (H): ICD-10-CM

## 2023-06-12 PROCEDURE — 99205 OFFICE O/P NEW HI 60 MIN: CPT | Performed by: INTERNAL MEDICINE

## 2023-06-12 NOTE — PROGRESS NOTES
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    Hypertension, hyperlipidemia, obesity, RUTHIE.    Biggest concern is over lightheadedness, dizziness, and SOB.  Occasional  chest discomfort not precipitated by exertion.   Worsened over the past 3 months.      Ziopatch showing 13 beat run of ventricular tachycardia.  Taken off atenlol 4-5 years ago.  No regular exercise.  No alcohol or smoking.  Family history of diabetes.      Recommend restart beta blocker and coronary angiograhy.    Today's clinic visit entailed:  Review of the result(s) of each unique test - ekg, ziopatch, stress echocardiogram  The following tests were independently interpreted by me as noted in my documentation: stress echocardiogram, ziopatch  Ordering of each unique test  Prescription drug management  45 minutes spent by me on the date of the encounter doing chart review, history and exam, documentation and further activities per the note  Provider  Link to [facility] Help Grid     The level of medical decision making during this visit was of high complexity.      No orders of the defined types were placed in this encounter.    No orders of the defined types were placed in this encounter.    There are no discontinued medications.      Encounter Diagnosis   Name Primary?     V-tach (H)        CURRENT MEDICATIONS:  Current Outpatient Medications   Medication Sig Dispense Refill     diclofenac (VOLTAREN) 50 MG EC tablet Take 1 tablet (50 mg) by mouth 2 times daily as needed for moderate pain (4-6) 180 tablet 3     hydrochlorothiazide (HYDRODIURIL) 12.5 MG tablet Take 1 tablet (12.5 mg) by mouth daily 90 tablet 3     hydrOXYzine (ATARAX) 25 MG tablet TAKE ONE TO TWO TABLETS BY MOUTH UP TO THREE TIMES DAILY AS NEEDED AND TWO TO FOUR TABLETS BY MOUTH AT NIGHT FOR SLEEP AND ANXIETY 30 tablet 11     losartan (COZAAR) 100 MG tablet Take 1 tablet (100 mg) by mouth daily 90 tablet 3     ORDER FOR DME  Auto-CPAP:  Max 15 cm H2O  Min 8 cm H2O    Continuous    Lifetime need and heated humidity.    1 each 0     pantoprazole (PROTONIX) 40 MG EC tablet Take 1 tablet (40 mg) by mouth daily 90 tablet 3     PARoxetine (PAXIL) 10 MG tablet TAKE ONE TABLET BY MOUTH EVERY MORNING TAKE IN ADDITION THE THE ONE 40MG TABLET FOR A TOTAL DOSE OF 50MG 90 tablet 3     PARoxetine (PAXIL) 40 MG tablet Take 1 tablet (40 mg) by mouth At Bedtime 90 tablet 3     rosuvastatin (CRESTOR) 10 MG tablet Take 1 tablet (10 mg) by mouth daily 90 tablet 3     testosterone cypionate (DEPOTESTOSTERONE) 200 MG/ML injection Inject 0.5 mLs (100 mg) into the muscle every 14 days 3 mL 5       ALLERGIES     Allergies   Allergen Reactions     Zantac      flushing       PAST MEDICAL HISTORY:  Past Medical History:   Diagnosis Date     Anxiety      Gastro-oesophageal reflux disease      Obesity (BMI 30-39.9)      Obstructive sleep apnea (adult) (pediatric)      panic attacks, and anxiety  1/5/2006    stable on paxil     PONV (postoperative nausea and vomiting)      Pure hypercholesterolemia      Unspecified essential hypertension        PAST SURGICAL HISTORY:  Past Surgical History:   Procedure Laterality Date     COLONOSCOPY N/A 12/29/2020    Procedure: COLONOSCOPY;  Surgeon: Frank Echeverria MD;  Location: [facility]     ESOPHAGOSCOPY, GASTROSCOPY, DUODENOSCOPY (EGD), COMBINED  12/17/2012    Procedure: COMBINED ESOPHAGOSCOPY, GASTROSCOPY, DUODENOSCOPY (EGD);  Gastroscopy  ;  Surgeon: Luc Smith MD;  Location: [facility]     ESOPHAGOSCOPY, GASTROSCOPY, DUODENOSCOPY (EGD), COMBINED N/A 12/29/2020    Procedure: ESOPHAGOGASTRODUODENOSCOPY, WITH BIOPSY;  Surgeon: Frank Echeverria MD;  Location: [facility]     HERNIA REPAIR       PROCURE BONE MARROW  1/3/2014    Procedure: PROCURE BONE MARROW;  Bone Marrow [place] [facility];  Surgeon: Chaz Negro MD;  Location: [facility]     SURGICAL HISTORY OF -       excision of bone tumor     SURGICAL HISTORY OF -   12/00     "vasectomy       FAMILY HISTORY:  Family History   Problem Relation Age of Onset     Diabetes Mother         type 2     Hypertension Father      Prostate Cancer Paternal Grandfather      Kidney Cancer Paternal Grandfather      Diabetes Brother      Heart Disease Paternal Uncle         MI at 49 yo     C.A.D. Other         paternal uncle  at 45 of MI     Cancer - colorectal No family hx of        SOCIAL HISTORY:  Social History     Socioeconomic History     Marital status:      Spouse name: None     Number of children: None     Years of education: None     Highest education level: None   Tobacco Use     Smoking status: Never     Smokeless tobacco: Never   Vaping Use     Vaping status: Never Used   Substance and Sexual Activity     Alcohol use: No     Comment: rare     Drug use: No     Sexual activity: Yes     Partners: Female   Other Topics Concern     Parent/sibling w/ CABG, MI or angioplasty before 65F 55M? No       Review of Systems:  Skin:        Eyes:       ENT:       Respiratory:  Positive for shortness of breath;dyspnea on exertion;sleep apnea;CPAP  Cardiovascular:  Negative for;edema;exercise intolerance lightheadedness;dizziness;chest pain;palpitations;syncope or near-syncope;fatigue;Positive for  Gastroenterology:      Genitourinary:       Musculoskeletal:       Neurologic:       Psychiatric:       Heme/Lymph/Imm:       Endocrine:         Physical Exam:  Vitals: BP (!) 142/95   Pulse 83   Ht 1.93 m (6' 4\")   Wt 147.5 kg (325 lb 3.2 oz)   BMI 39.58 kg/m      Constitutional:           Skin:             Head:           Eyes:           Lymph:      ENT:           Neck:           Respiratory:            Cardiac:                                                           GI:           Extremities and Muscular Skeletal:                 Neurological:           Psych:         Recent Lab Results:  LIPID RESULTS:  Lab Results   Component Value Date    CHOL 156 2023    CHOL 180 2020    HDL 43 " 02/21/2023    HDL 47 08/11/2020    LDL 94 02/21/2023     (H) 08/11/2020    TRIG 97 02/21/2023    TRIG 133 08/11/2020    CHOLHDLRATIO 6.1 (H) 01/21/2015       LIVER ENZYME RESULTS:  Lab Results   Component Value Date    AST 32 02/21/2023    AST 26 07/02/2021    ALT 42 02/21/2023    ALT 52 07/02/2021       CBC RESULTS:  Lab Results   Component Value Date    WBC 5.9 02/21/2023    WBC 6.9 07/02/2021    RBC 5.26 02/21/2023    RBC 5.38 07/02/2021    HGB 15.3 02/21/2023    HGB 15.8 07/02/2021    HCT 46.3 02/21/2023    HCT 46.2 07/02/2021    MCV 88 02/21/2023    MCV 86 07/02/2021    MCH 29.1 02/21/2023    MCH 29.4 07/02/2021    MCHC 33.0 02/21/2023    MCHC 34.2 07/02/2021    RDW 13.1 02/21/2023    RDW 13.2 07/02/2021     02/21/2023     07/02/2021       BMP RESULTS:  Lab Results   Component Value Date     02/21/2023     07/02/2021    POTASSIUM 4.3 02/21/2023    POTASSIUM 4.1 04/07/2022    POTASSIUM 4.2 07/02/2021    CHLORIDE 104 02/21/2023    CHLORIDE 107 04/07/2022    CHLORIDE 108 07/02/2021    CO2 25 02/21/2023    CO2 29 04/07/2022    CO2 24 07/02/2021    ANIONGAP 11 02/21/2023    ANIONGAP 3 04/07/2022    ANIONGAP 8 07/02/2021     (H) 02/21/2023    GLC 89 04/07/2022     (H) 07/02/2021    BUN 16.5 02/21/2023    BUN 19 04/07/2022    BUN 21 07/02/2021    CR 0.96 02/21/2023    CR 0.97 07/02/2021    GFRESTIMATED >90 02/21/2023    GFRESTIMATED >90 07/02/2021    GFRESTBLACK >90 07/02/2021    RAH 9.4 02/21/2023    RAH 9.0 07/02/2021        A1C RESULTS:  Lab Results   Component Value Date    A1C 5.1 12/26/2013       INR RESULTS:  Lab Results   Component Value Date    INR 1.03 12/09/2013    INR 1.04 04/02/2004           CC  Ashley Crawley, DO  [address] [place] [address]

## 2023-06-12 NOTE — LETTER
6/12/2023    Ashley Crawley, DO  [address] [facility] [address]    RE: Amarjit VILLA [place]       Dear Colleague,     I had the pleasure of seeing Amarjit Malik in the [facility].  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    Hypertension, hyperlipidemia, obesity, RUTHIE.    Biggest concern is over lightheadedness, dizziness, and SOB.  Occasional  chest discomfort not precipitated by exertion.   Worsened over the past 3 months.      Ziopatch showing 13 beat run of ventricular tachycardia.  Taken off atenlol 4-5 years ago.  No regular exercise.  No alcohol or smoking.  Family history of diabetes.      Recommend restart beta blocker and coronary angiograhy.    Today's clinic visit entailed:  Review of the result(s) of each unique test - ekg, ziopatch, stress echocardiogram  The following tests were independently interpreted by me as noted in my documentation: stress echocardiogram, ziopatch  Ordering of each unique test  Prescription drug management  45 minutes spent by me on the date of the encounter doing chart review, history and exam, documentation and further activities per the note  Provider  Link to [facility] Help Grid     The level of medical decision making during this visit was of high complexity.      No orders of the defined types were placed in this encounter.    No orders of the defined types were placed in this encounter.    There are no discontinued medications.      Encounter Diagnosis   Name Primary?    V-tach (H)        CURRENT MEDICATIONS:  Current Outpatient Medications   Medication Sig Dispense Refill    diclofenac (VOLTAREN) 50 MG EC tablet Take 1 tablet (50 mg) by mouth 2 times daily as needed for moderate pain (4-6) 180 tablet 3    hydrochlorothiazide (HYDRODIURIL) 12.5 MG tablet Take 1 tablet (12.5 mg) by mouth daily 90 tablet 3    hydrOXYzine (ATARAX) 25 MG tablet TAKE ONE TO TWO TABLETS BY MOUTH UP TO THREE TIMES DAILY  AS NEEDED AND TWO TO FOUR TABLETS BY MOUTH AT NIGHT FOR SLEEP AND ANXIETY 30 tablet 11    losartan (COZAAR) 100 MG tablet Take 1 tablet (100 mg) by mouth daily 90 tablet 3    ORDER FOR DME Auto-CPAP:  Max 15 cm H2O  Min 8 cm H2O    Continuous    Lifetime need and heated humidity.    1 each 0    pantoprazole (PROTONIX) 40 MG EC tablet Take 1 tablet (40 mg) by mouth daily 90 tablet 3    PARoxetine (PAXIL) 10 MG tablet TAKE ONE TABLET BY MOUTH EVERY MORNING TAKE IN ADDITION THE THE ONE 40MG TABLET FOR A TOTAL DOSE OF 50MG 90 tablet 3    PARoxetine (PAXIL) 40 MG tablet Take 1 tablet (40 mg) by mouth At Bedtime 90 tablet 3    rosuvastatin (CRESTOR) 10 MG tablet Take 1 tablet (10 mg) by mouth daily 90 tablet 3    testosterone cypionate (DEPOTESTOSTERONE) 200 MG/ML injection Inject 0.5 mLs (100 mg) into the muscle every 14 days 3 mL 5       ALLERGIES     Allergies   Allergen Reactions    Zantac      flushing       PAST MEDICAL HISTORY:  Past Medical History:   Diagnosis Date    Anxiety     Gastro-oesophageal reflux disease     Obesity (BMI 30-39.9)     Obstructive sleep apnea (adult) (pediatric)     panic attacks, and anxiety  1/5/2006    stable on paxil    PONV (postoperative nausea and vomiting)     Pure hypercholesterolemia     Unspecified essential hypertension        PAST SURGICAL HISTORY:  Past Surgical History:   Procedure Laterality Date    COLONOSCOPY N/A 12/29/2020    Procedure: COLONOSCOPY;  Surgeon: Frank Echeverria MD;  Location: [facility]    ESOPHAGOSCOPY, GASTROSCOPY, DUODENOSCOPY (EGD), COMBINED  12/17/2012    Procedure: COMBINED ESOPHAGOSCOPY, GASTROSCOPY, DUODENOSCOPY (EGD);  Gastroscopy  ;  Surgeon: Luc Smith MD;  Location: [facility]    ESOPHAGOSCOPY, GASTROSCOPY, DUODENOSCOPY (EGD), COMBINED N/A 12/29/2020    Procedure: ESOPHAGOGASTRODUODENOSCOPY, WITH BIOPSY;  Surgeon: Frank Echeverria MD;  Location: [facility]    HERNIA REPAIR      PROCURE BONE MARROW  1/3/2014    Procedure: PROCURE BONE MARROW;  Bone  "[place] [facility];  Surgeon: Chaz Negro MD;  Location: [facility]    SURGICAL HISTORY OF -       excision of bone tumor    SURGICAL HISTORY OF -       vasectomy       FAMILY HISTORY:  Family History   Problem Relation Age of Onset    Diabetes Mother         type 2    Hypertension Father     Prostate Cancer Paternal Grandfather     Kidney Cancer Paternal Grandfather     Diabetes Brother     Heart Disease Paternal Uncle         MI at 49 yo    C.A.D. Other         paternal uncle  at 45 of MI    Cancer - colorectal No family hx of        SOCIAL HISTORY:  Social History     Socioeconomic History    Marital status:      Spouse name: None    Number of children: None    Years of education: None    Highest education level: None   Tobacco Use    Smoking status: Never    Smokeless tobacco: Never   Vaping Use    Vaping status: Never Used   Substance and Sexual Activity    Alcohol use: No     Comment: rare    Drug use: No    Sexual activity: Yes     Partners: Female   Other Topics Concern    Parent/sibling w/ CABG, MI or angioplasty before 65F 55M? No       Review of Systems:  Skin:        Eyes:       ENT:       Respiratory:  Positive for shortness of breath;dyspnea on exertion;sleep apnea;CPAP  Cardiovascular:  Negative for;edema;exercise intolerance lightheadedness;dizziness;chest pain;palpitations;syncope or near-syncope;fatigue;Positive for  Gastroenterology:      Genitourinary:       Musculoskeletal:       Neurologic:       Psychiatric:       Heme/Lymph/Imm:       Endocrine:         Physical Exam:  Vitals: BP (!) 142/95   Pulse 83   Ht 1.93 m (6' 4\")   Wt 147.5 kg (325 lb 3.2 oz)   BMI 39.58 kg/m      Constitutional:           Skin:             Head:           Eyes:           Lymph:      ENT:           Neck:           Respiratory:            Cardiac:                                                           GI:           Extremities and Muscular Skeletal:                 Neurological:       "     Psych:         Recent Lab Results:  LIPID RESULTS:  Lab Results   Component Value Date    CHOL 156 02/21/2023    CHOL 180 08/11/2020    HDL 43 02/21/2023    HDL 47 08/11/2020    LDL 94 02/21/2023     (H) 08/11/2020    TRIG 97 02/21/2023    TRIG 133 08/11/2020    CHOLHDLRATIO 6.1 (H) 01/21/2015       LIVER ENZYME RESULTS:  Lab Results   Component Value Date    AST 32 02/21/2023    AST 26 07/02/2021    ALT 42 02/21/2023    ALT 52 07/02/2021       CBC RESULTS:  Lab Results   Component Value Date    WBC 5.9 02/21/2023    WBC 6.9 07/02/2021    RBC 5.26 02/21/2023    RBC 5.38 07/02/2021    HGB 15.3 02/21/2023    HGB 15.8 07/02/2021    HCT 46.3 02/21/2023    HCT 46.2 07/02/2021    MCV 88 02/21/2023    MCV 86 07/02/2021    MCH 29.1 02/21/2023    MCH 29.4 07/02/2021    MCHC 33.0 02/21/2023    MCHC 34.2 07/02/2021    RDW 13.1 02/21/2023    RDW 13.2 07/02/2021     02/21/2023     07/02/2021       BMP RESULTS:  Lab Results   Component Value Date     02/21/2023     07/02/2021    POTASSIUM 4.3 02/21/2023    POTASSIUM 4.1 04/07/2022    POTASSIUM 4.2 07/02/2021    CHLORIDE 104 02/21/2023    CHLORIDE 107 04/07/2022    CHLORIDE 108 07/02/2021    CO2 25 02/21/2023    CO2 29 04/07/2022    CO2 24 07/02/2021    ANIONGAP 11 02/21/2023    ANIONGAP 3 04/07/2022    ANIONGAP 8 07/02/2021     (H) 02/21/2023    GLC 89 04/07/2022     (H) 07/02/2021    BUN 16.5 02/21/2023    BUN 19 04/07/2022    BUN 21 07/02/2021    CR 0.96 02/21/2023    CR 0.97 07/02/2021    GFRESTIMATED >90 02/21/2023    GFRESTIMATED >90 07/02/2021    GFRESTBLACK >90 07/02/2021    RAH 9.4 02/21/2023    RAH 9.0 07/02/2021        A1C RESULTS:  Lab Results   Component Value Date    A1C 5.1 12/26/2013       INR RESULTS:  Lab Results   Component Value Date    INR 1.03 12/09/2013    INR 1.04 04/02/2004           CC  Ashley Crawley, DO  [address] [place] [address]      Thank you for allowing me to participate in the  care of your patient.      Sincerely,     JS ANDREWS MD     [facility]

## 2023-06-13 ENCOUNTER — TELEPHONE (OUTPATIENT)
Dept: CARDIOLOGY | Facility: CLINIC | Age: 53
End: 2023-06-13
Payer: COMMERCIAL

## 2023-06-13 DIAGNOSIS — I47.20 V-TACH (H): Primary | ICD-10-CM

## 2023-06-13 RX ORDER — SODIUM CHLORIDE 9 MG/ML
INJECTION, SOLUTION INTRAVENOUS CONTINUOUS
Status: CANCELLED | OUTPATIENT
Start: 2023-06-13

## 2023-06-13 RX ORDER — ASPIRIN 325 MG
325 TABLET ORAL ONCE
Status: CANCELLED | OUTPATIENT
Start: 2023-06-13 | End: 2023-06-13

## 2023-06-13 RX ORDER — ASPIRIN 81 MG/1
243 TABLET, CHEWABLE ORAL ONCE
Status: CANCELLED | OUTPATIENT
Start: 2023-06-13

## 2023-06-13 RX ORDER — LIDOCAINE 40 MG/G
CREAM TOPICAL
Status: CANCELLED | OUTPATIENT
Start: 2023-06-13

## 2023-06-13 RX ORDER — POTASSIUM CHLORIDE 1500 MG/1
20 TABLET, EXTENDED RELEASE ORAL
Status: CANCELLED | OUTPATIENT
Start: 2023-06-13

## 2023-06-13 NOTE — TELEPHONE ENCOUNTER
Coronary angiogram/PCI/Right Heart Cath prep instructions.     Patient is scheduled for a Coronary Angiogram at [facility] - [address], [place] [address] - Main Entrance of the Hospital on 6/15/23.  Check in time is at 8:30 am and procedure to follow.    Patient instructed to remain NPO for solid foods 8 hours prior to arrival and may have clear liquids up to 2 hours prior to arrival.     Patient does not require extra fluids prior to procedure.    Patient is not diabetic.    Patient is not on anticoagulation.    Patient is taking hydrochlorothiazide and has been instructed to hold it the morning of procedure.      Patient is not currently taking ASA and has been advised to take one 325 mg tablet the day prior and morning of the procedure.    Pt is not on a SGLT2 inhibitor.    Patient advised to take their other daily medications the morning of the procedure with small sips of water.     Verified patient does not have a contrast allergy.    Verified patient has someone available to drive them home from the hospital and can stay with them for 24 hours after the procedure.     Patient advised to notify care team with any new COVID like symptoms prior to procedure.    Patient will check their temperature the morning of procedure and call [facility] at [phone] if temp is >100.0.    Patient is aware of visitor policy.    Patient expresses understanding of above instructions and denies further questions at this time.      [facility]

## 2023-06-14 ENCOUNTER — TELEPHONE (OUTPATIENT)
Dept: MEDSURG UNIT | Facility: CLINIC | Age: 53
End: 2023-06-14
Payer: COMMERCIAL

## 2023-06-15 ENCOUNTER — HOSPITAL ENCOUNTER (OUTPATIENT)
Facility: CLINIC | Age: 53
Discharge: HOME | End: 2023-06-15
Admitting: INTERNAL MEDICINE
Payer: COMMERCIAL

## 2023-06-15 VITALS
HEART RATE: 75 BPM | OXYGEN SATURATION: 96 % | HEIGHT: 76 IN | WEIGHT: 315 LBS | TEMPERATURE: 98.6 F | DIASTOLIC BLOOD PRESSURE: 82 MMHG | SYSTOLIC BLOOD PRESSURE: 113 MMHG | BODY MASS INDEX: 38.36 KG/M2 | RESPIRATION RATE: 16 BRPM

## 2023-06-15 DIAGNOSIS — I47.20 V-TACH (H): ICD-10-CM

## 2023-06-15 DIAGNOSIS — R00.2 PALPITATIONS: Primary | ICD-10-CM

## 2023-06-15 PROBLEM — Z98.890 STATUS POST CORONARY ANGIOGRAM: Status: ACTIVE | Noted: 2023-06-15

## 2023-06-15 LAB
ANION GAP SERPL CALCULATED.3IONS-SCNC: 10 MMOL/L (ref 7–15)
BUN SERPL-MCNC: 16.7 MG/DL (ref 6–20)
CALCIUM SERPL-MCNC: 9.2 MG/DL (ref 8.6–10)
CHLORIDE SERPL-SCNC: 103 MMOL/L (ref 98–107)
CREAT SERPL-MCNC: 0.97 MG/DL (ref 0.67–1.17)
DEPRECATED HCO3 PLAS-SCNC: 28 MMOL/L (ref 22–29)
ERYTHROCYTE [DISTWIDTH] IN BLOOD BY AUTOMATED COUNT: 13.4 % (ref 10–15)
GFR SERPL CREATININE-BSD FRML MDRD: >90 ML/MIN/1.73M2
GLUCOSE SERPL-MCNC: 108 MG/DL (ref 70–99)
HCT VFR BLD AUTO: 47 % (ref 40–53)
HGB BLD-MCNC: 15.7 G/DL (ref 13.3–17.7)
INR PPP: 0.96 (ref 0.85–1.15)
MCH RBC QN AUTO: 29.6 PG (ref 26.5–33)
MCHC RBC AUTO-ENTMCNC: 33.4 G/DL (ref 31.5–36.5)
MCV RBC AUTO: 89 FL (ref 78–100)
PLATELET # BLD AUTO: 256 10E3/UL (ref 150–450)
POTASSIUM SERPL-SCNC: 4.1 MMOL/L (ref 3.4–5.3)
RBC # BLD AUTO: 5.31 10E6/UL (ref 4.4–5.9)
SODIUM SERPL-SCNC: 141 MMOL/L (ref 136–145)
WBC # BLD AUTO: 6.1 10E3/UL (ref 4–11)

## 2023-06-15 PROCEDURE — 99152 MOD SED SAME PHYS/QHP 5/>YRS: CPT | Performed by: INTERNAL MEDICINE

## 2023-06-15 PROCEDURE — 258N000003 HC RX IP 258 OP 636: Performed by: INTERNAL MEDICINE

## 2023-06-15 PROCEDURE — 85027 COMPLETE CBC AUTOMATED: CPT | Performed by: INTERNAL MEDICINE

## 2023-06-15 PROCEDURE — 999N000184 HC STATISTIC TELEMETRY

## 2023-06-15 PROCEDURE — 999N000071 HC STATISTIC HEART CATH LAB OR EP LAB

## 2023-06-15 PROCEDURE — 272N000001 HC OR GENERAL SUPPLY STERILE: Performed by: INTERNAL MEDICINE

## 2023-06-15 PROCEDURE — 93458 L HRT ARTERY/VENTRICLE ANGIO: CPT | Mod: 26 | Performed by: INTERNAL MEDICINE

## 2023-06-15 PROCEDURE — 999N000054 HC STATISTIC EKG NON-CHARGEABLE

## 2023-06-15 PROCEDURE — 80048 BASIC METABOLIC PNL TOTAL CA: CPT | Performed by: INTERNAL MEDICINE

## 2023-06-15 PROCEDURE — 36591 DRAW BLOOD OFF VENOUS DEVICE: CPT

## 2023-06-15 PROCEDURE — 93005 ELECTROCARDIOGRAM TRACING: CPT

## 2023-06-15 PROCEDURE — 93458 L HRT ARTERY/VENTRICLE ANGIO: CPT | Performed by: INTERNAL MEDICINE

## 2023-06-15 PROCEDURE — 36415 COLL VENOUS BLD VENIPUNCTURE: CPT | Performed by: INTERNAL MEDICINE

## 2023-06-15 PROCEDURE — 250N000011 HC RX IP 250 OP 636: Performed by: INTERNAL MEDICINE

## 2023-06-15 PROCEDURE — 250N000009 HC RX 250: Performed by: INTERNAL MEDICINE

## 2023-06-15 PROCEDURE — 85610 PROTHROMBIN TIME: CPT | Performed by: INTERNAL MEDICINE

## 2023-06-15 RX ORDER — ACETAMINOPHEN 325 MG/1
650 TABLET ORAL EVERY 4 HOURS PRN
Status: DISCONTINUED | OUTPATIENT
Start: 2023-06-15 | End: 2023-06-15 | Stop reason: HOSPADM

## 2023-06-15 RX ORDER — POTASSIUM CHLORIDE 1500 MG/1
20 TABLET, EXTENDED RELEASE ORAL
Status: DISCONTINUED | OUTPATIENT
Start: 2023-06-15 | End: 2023-06-15 | Stop reason: HOSPADM

## 2023-06-15 RX ORDER — FENTANYL CITRATE 50 UG/ML
INJECTION, SOLUTION INTRAMUSCULAR; INTRAVENOUS
Status: DISCONTINUED | OUTPATIENT
Start: 2023-06-15 | End: 2023-06-15 | Stop reason: HOSPADM

## 2023-06-15 RX ORDER — ASPIRIN 325 MG
325 TABLET ORAL ONCE
Status: COMPLETED | OUTPATIENT
Start: 2023-06-15 | End: 2023-06-15

## 2023-06-15 RX ORDER — ATROPINE SULFATE 0.1 MG/ML
0.5 INJECTION INTRAVENOUS
Status: DISCONTINUED | OUTPATIENT
Start: 2023-06-15 | End: 2023-06-15 | Stop reason: HOSPADM

## 2023-06-15 RX ORDER — METOPROLOL SUCCINATE 50 MG/1
50 TABLET, EXTENDED RELEASE ORAL DAILY
Qty: 90 TABLET | Refills: 3 | Status: SHIPPED | OUTPATIENT
Start: 2023-06-15 | End: 2024-03-15

## 2023-06-15 RX ORDER — SODIUM CHLORIDE 9 MG/ML
INJECTION, SOLUTION INTRAVENOUS CONTINUOUS
Status: DISCONTINUED | OUTPATIENT
Start: 2023-06-15 | End: 2023-06-15 | Stop reason: HOSPADM

## 2023-06-15 RX ORDER — NALOXONE HYDROCHLORIDE 0.4 MG/ML
0.2 INJECTION, SOLUTION INTRAMUSCULAR; INTRAVENOUS; SUBCUTANEOUS
Status: DISCONTINUED | OUTPATIENT
Start: 2023-06-15 | End: 2023-06-15 | Stop reason: HOSPADM

## 2023-06-15 RX ORDER — IOPAMIDOL 755 MG/ML
INJECTION, SOLUTION INTRAVASCULAR
Status: DISCONTINUED | OUTPATIENT
Start: 2023-06-15 | End: 2023-06-15 | Stop reason: HOSPADM

## 2023-06-15 RX ORDER — OXYCODONE HYDROCHLORIDE 5 MG/1
5 TABLET ORAL EVERY 4 HOURS PRN
Status: DISCONTINUED | OUTPATIENT
Start: 2023-06-15 | End: 2023-06-15 | Stop reason: HOSPADM

## 2023-06-15 RX ORDER — FLUMAZENIL 0.1 MG/ML
0.2 INJECTION, SOLUTION INTRAVENOUS
Status: DISCONTINUED | OUTPATIENT
Start: 2023-06-15 | End: 2023-06-15 | Stop reason: HOSPADM

## 2023-06-15 RX ORDER — LIDOCAINE 40 MG/G
CREAM TOPICAL
Status: DISCONTINUED | OUTPATIENT
Start: 2023-06-15 | End: 2023-06-15 | Stop reason: HOSPADM

## 2023-06-15 RX ORDER — OXYCODONE HYDROCHLORIDE 5 MG/1
10 TABLET ORAL EVERY 4 HOURS PRN
Status: DISCONTINUED | OUTPATIENT
Start: 2023-06-15 | End: 2023-06-15 | Stop reason: HOSPADM

## 2023-06-15 RX ORDER — ASPIRIN 81 MG/1
243 TABLET, CHEWABLE ORAL ONCE
Status: COMPLETED | OUTPATIENT
Start: 2023-06-15 | End: 2023-06-15

## 2023-06-15 RX ORDER — NALOXONE HYDROCHLORIDE 0.4 MG/ML
0.4 INJECTION, SOLUTION INTRAMUSCULAR; INTRAVENOUS; SUBCUTANEOUS
Status: DISCONTINUED | OUTPATIENT
Start: 2023-06-15 | End: 2023-06-15 | Stop reason: HOSPADM

## 2023-06-15 RX ORDER — ASPIRIN 81 MG/1
81 TABLET, CHEWABLE ORAL DAILY
COMMUNITY
End: 2024-03-11

## 2023-06-15 RX ADMIN — SODIUM CHLORIDE: 9 INJECTION, SOLUTION INTRAVENOUS at 08:44

## 2023-06-15 ASSESSMENT — ACTIVITIES OF DAILY LIVING (ADL)
ADLS_ACUITY_SCORE: 35

## 2023-06-15 NOTE — PROGRESS NOTES
Care Suites Post Procedure Note    Patient Information  Name: Amarjit aMlik  Age: 52 year old    Post Procedure  Time patient returned to Care Suites: 1145  Concerns/abnormal assessment: none at present  If abnormal assessment, provider notified: N/A  Plan/Other: monitor    Krzysztof Aguillon RN

## 2023-06-15 NOTE — DISCHARGE INSTRUCTIONS
Cardiac Angiogram Discharge Instructions - Femoral    After you go home:    Have an adult stay with you until tomorrow.  Drink extra fluids for 2 days.  You may resume your normal diet.  No smoking       For 24 hours - due to the sedation you received:  Relax and take it easy.  Do NOT make any important or legal decisions.  Do NOT drive or operate machines at home or at work.  Do NOT drink alcohol.    Care of Groin Puncture Site:    For the first 24 hrs - check the puncture site every 1-2 hours while awake.  For 2 days, when you cough, sneeze, laugh or move your bowels, hold your hand over the puncture site and press firmly.  Remove the bandaid after 24 hours. If there is minor oozing, apply another bandaid and remove it after 12 hours.  It is normal to have a small bruise or pea size lump at the site.  You may shower tomorrow. Do NOT take a bath, or use a hot tub or pool for at least 3 days. Do NOT scrub the site. Do not use lotion or powder near the puncture site.    Activity:            For 2 days:  No stooping or squatting  Do NOT do any heavy activity such as exercise, lifting, or straining.   No housework, yard work or any activity that make you sweat  Do NOT lift more than 10 pounds    Bleeding:    If you start bleeding from the site in your groin, lie down flat and press firmly on/above the site for 10 minutes.   Once bleeding stops, lay flat for 2 hours.   Call [facility] as soon as you can.       Call 911 right away if you have heavy bleeding or bleeding that does not stop.      Medicines:    If you are taking an antiplatelet medication such as Plavix, Brilinta or Effient, do not stop taking it until you talk to your cardiologist.    If you are on Metformin (Glucophage), do not restart it until you have blood tests (within 2 to 3 days after discharge).  After you have your blood drawn, you may restart the Metformin.   Take your medications, including blood thinners, unless your provider tells you not to.     If you take Coumadin (Warfarin), have your INR checked by your provider in  3-5 days. Call your clinic to schedule this.  If you have stopped any medicines, check with your provider about when to restart them.    Follow Up Appointments:    Follow up with [facility] Nurse Practitioner at [facility] of patient preference in 7-10 days.    Call the clinic if:    You have increased pain or a large or growing hard lump around the site.  The site is red, swollen, hot or tender.  Blood or fluid is draining from the site.  You have chills or a fever greater than 101 F (38 C).  Your leg feels numb, cool or changes color.  You have hives, a rash or unusual itching.  New pain in the back or belly that you cannot control with Tylenol.  Any questions or concerns.          [facility] [place]:    [phone] [facility] (7 days a week)

## 2023-06-15 NOTE — PROGRESS NOTES
Care Suites Admission Nursing Note    Patient Information  Name: Amarjit Malik  Age: 52 year old  Reason for admission: Angiogram  Care Suites arrival time: 0815    Visitor Information  Name: Yolanda     Patient Admission/Assessment   Pre-procedure assessment complete: Yes  If abnormal assessment/labs, provider notified: N/A  NPO: Yes  Medications held per instructions/orders: Yes  Consent: to be obtained  Patient oriented to room: Yes  Education/questions answered: Yes  Plan/other: Proceed as plannned    Discharge Planning  Discharge name/phone number:  Yolanda [phone]  Overnight post sedation caregiver: yes  Discharge location: home    Briana Carballo RN

## 2023-06-15 NOTE — PRE-PROCEDURE
GENERAL PRE-PROCEDURE:   Procedure:  Coronary angiogram, LVG  Date/Time:  6/15/2023 10:48 AM    Verbal consent obtained?: Yes    Risks and benefits: Risks, benefits and alternatives were discussed    Consent given by:  Patient  Patient states understanding of procedure being performed: Yes    Patient's understanding of procedure matches consent: Yes    Procedure consent matches procedure scheduled: Yes    Expected level of sedation:  Moderate  Appropriately NPO:  Yes  ASA Class:  1  Mallampati  :  Grade 1- soft palate, uvula, tonsillar pillars, and posterior pharyngeal wall visible  Lungs:  Lungs clear with good breath sounds bilaterally  Heart:  Normal heart sounds and rate  History & Physical reviewed:  History and physical reviewed and no updates needed  Statement of review:  I have reviewed the lab findings, diagnostic data, medications, and the plan for sedation

## 2023-06-15 NOTE — PROGRESS NOTES
Care Suites Discharge Nursing Note    Patient Information  Name: Amarjit Malik  Age: 52 year old    Discharge Education:  Discharge instructions reviewed: Yes  Additional education/resources provided: NA  Patient/patient representative verbalizes understanding: Yes  Patient discharging on new medications: Yes  Medication education completed: Yes    Discharge Plans:   Discharge location: home  Discharge ride contacted: Yes  Approximate discharge time: 1400    Discharge Criteria:  Discharge criteria met and vital signs stable: Yes    Patient Belongs:  Patient belongings returned to patient: Yes    Briana Carballo RN

## 2023-06-22 LAB
ATRIAL RATE - MUSE: 69 BPM
DIASTOLIC BLOOD PRESSURE - MUSE: NORMAL MMHG
INTERPRETATION ECG - MUSE: NORMAL
P AXIS - MUSE: 49 DEGREES
PR INTERVAL - MUSE: 176 MS
QRS DURATION - MUSE: 94 MS
QT - MUSE: 384 MS
QTC - MUSE: 411 MS
R AXIS - MUSE: 63 DEGREES
SYSTOLIC BLOOD PRESSURE - MUSE: NORMAL MMHG
T AXIS - MUSE: 50 DEGREES
VENTRICULAR RATE- MUSE: 69 BPM

## 2023-07-21 ENCOUNTER — OFFICE VISIT (OUTPATIENT)
Dept: CARDIOLOGY | Facility: CLINIC | Age: 53
End: 2023-07-21
Payer: COMMERCIAL

## 2023-07-21 VITALS
HEIGHT: 76 IN | SYSTOLIC BLOOD PRESSURE: 143 MMHG | HEART RATE: 62 BPM | OXYGEN SATURATION: 94 % | WEIGHT: 315 LBS | DIASTOLIC BLOOD PRESSURE: 94 MMHG | BODY MASS INDEX: 38.36 KG/M2

## 2023-07-21 DIAGNOSIS — I10 BENIGN ESSENTIAL HYPERTENSION: Primary | Chronic | ICD-10-CM

## 2023-07-21 DIAGNOSIS — E78.5 HYPERLIPIDEMIA LDL GOAL <130: ICD-10-CM

## 2023-07-21 PROCEDURE — 99214 OFFICE O/P EST MOD 30 MIN: CPT | Performed by: PHYSICIAN ASSISTANT

## 2023-07-21 RX ORDER — LOSARTAN POTASSIUM 25 MG/1
25 TABLET ORAL DAILY
Qty: 90 TABLET | Refills: 1 | Status: SHIPPED | OUTPATIENT
Start: 2023-07-21 | End: 2024-01-30

## 2023-07-21 RX ORDER — ROSUVASTATIN CALCIUM 20 MG/1
20 TABLET, COATED ORAL DAILY
Qty: 90 TABLET | Refills: 1 | Status: SHIPPED | OUTPATIENT
Start: 2023-07-21 | End: 2024-01-30

## 2023-07-21 NOTE — PROGRESS NOTES
Cardiology Progress Note    Patient seen today in follow up of: post angiogram  Primary cardiologist: Dr. Bates    HPI:  Amarjit Malik is a very pleasant 53 year old male with a history of Hypertension, hyperlipidemia, obesity, and obstructive sleep apnea who was recently referred to cardiology clinic after a Ziopatch showed a 13 beat run of VT.    He was seen in consultation by Dr. Bates on 6/12/23.  The Ziopatch was done for lightheadedness, dizziness and some shortness of breath.  He had some occasional chest discomfort not precipitated by exertion.  His symptoms to him to be somewhat worse in the last few months.  He previously was on atenolol but had been taken off of this many years ago.  He was restarted on a beta-blocker (Metoprolol XL 50 mg daily) and referred for coronary angiography for further evaluation.  Of note, on the monitor, his symptoms correlated with sinus rhythm or sinus tachycardia. He did have a stress echocardiogram on 5/27/2022 which showed no evidence of ischemia.  His resting echocardiogram was normal.    He underwent coronary angiography with Dr. Bates on 6/15/2023. He was found to have mild nonobstructive CAD with a 30% ostial/proximal LAD lesions and 10% proximal/mid LAD lesion. LV function was hyperdynamic on LV gram.    He returns today follow up. He reports feeling significantly better since starting Metoprolol. His dyspnea has resolved. No lightheadedness/dizziness. He reports feeling unwell after he was started on hydrochlorothiazide with fatigue and presyncope. HRs have been stable in the 70 - 80s. BPs have been mildly elevated in the 140 systolic typically. He notes his femoral access is well healed without ecchymosis or bruit.    ASSESSMENT/PLAN:  Amarjit Malik is a very pleasant 53 year old male with a history of RUTHIE, hyperlpidemia, hypertension and obesity is here today for follow-up after his angiogram.  As noted, he recently was experiencing symptoms of some  lightheadedness and shortness of breath for which he underwent monitoring with a Zio patch.  He was found to have a 13 beat run of ventricular tachycardia which was asymptomatic.  He was restarted on beta-blockers with metoprolol XL 50 mg daily and was set up for coronary angiography.     We reviewed his coronary angiogram which showed mild nonobstructive coronary artery disease in the LAD.  No LV dysfunction noted on LV gram and he had a normal stress echo last year. Discussed the importance of risk factor modification including management of his hyperlipidemia, hypertension, weight loss, diet and exercise.  He is on Crestor 10 mg daily and aspirin 81 mg daily.  Last LDL was in the 90s in February of this year.  Will increase Crestor to 20 mg daily.  If he has any issues on that dose, he will asked him to let us know.  Repeat lipid panel can bed done at his annual physical.     Blood pressures have been mildly elevated and remain mildly elevated today.  He is on metoprolol XL 50 mg daily.  He previously was on 100 mg of losartan.  We will restart as a smaller dose of 25 mg daily.  He thinks he had side effects from hydrochlorothiazide so we will avoid that.  He plans to watch his blood pressure over the next few weeks.  He can monitor this at work.  If blood pressures are consistently greater than 130/80, I asked him to contact his PCP or our clinic.  Losartan can be increased as needed.    It was a pleasure meeting Amarjit in clinic today. I am glad his symptoms have improved. If he has any recurrent new symptoms or recurrent shortness of breath, I asked him to contact us.     Orders this Visit:  No orders of the defined types were placed in this encounter.    Orders Placed This Encounter   Medications    rosuvastatin (CRESTOR) 20 MG tablet     Sig: Take 1 tablet (20 mg) by mouth daily     Dispense:  90 tablet     Refill:  1    losartan (COZAAR) 25 MG tablet     Sig: Take 1 tablet (25 mg) by mouth daily     Dispense:   90 tablet     Refill:  1     Medications Discontinued During This Encounter   Medication Reason    hydrochlorothiazide (HYDRODIURIL) 12.5 MG tablet     rosuvastatin (CRESTOR) 10 MG tablet Reorder (No AVS)    losartan (COZAAR) 100 MG tablet        CURRENT MEDICATIONS:  Current Outpatient Medications   Medication Sig Dispense Refill    diclofenac (VOLTAREN) 50 MG EC tablet Take 1 tablet (50 mg) by mouth 2 times daily as needed for moderate pain (4-6) 180 tablet 3    hydrOXYzine (ATARAX) 25 MG tablet TAKE ONE TO TWO TABLETS BY MOUTH UP TO THREE TIMES DAILY AS NEEDED AND TWO TO FOUR TABLETS BY MOUTH AT NIGHT FOR SLEEP AND ANXIETY 30 tablet 11    losartan (COZAAR) 25 MG tablet Take 1 tablet (25 mg) by mouth daily 90 tablet 1    metoprolol succinate ER (TOPROL XL) 50 MG 24 hr tablet Take 1 tablet (50 mg) by mouth daily 90 tablet 3    ORDER FOR DME Auto-CPAP:  Max 15 cm H2O  Min 8 cm H2O    Continuous    Lifetime need and heated humidity.    1 each 0    pantoprazole (PROTONIX) 40 MG EC tablet Take 1 tablet (40 mg) by mouth daily 90 tablet 3    PARoxetine (PAXIL) 10 MG tablet TAKE ONE TABLET BY MOUTH EVERY MORNING TAKE IN ADDITION THE THE ONE 40MG TABLET FOR A TOTAL DOSE OF 50MG 90 tablet 3    PARoxetine (PAXIL) 40 MG tablet Take 1 tablet (40 mg) by mouth At Bedtime 90 tablet 3    rosuvastatin (CRESTOR) 20 MG tablet Take 1 tablet (20 mg) by mouth daily 90 tablet 1    testosterone cypionate (DEPOTESTOSTERONE) 200 MG/ML injection Inject 0.5 mLs (100 mg) into the muscle every 14 days 3 mL 5    aspirin (ASA) 81 MG chewable tablet Take 81 mg by mouth daily Took 4 baby ASA x 3 days for angiogram today (Patient not taking: Reported on 7/21/2023)       ALLERGIES  Allergies   Allergen Reactions    Zantac      flushing     PAST MEDICAL HISTORY:  Past Medical History:   Diagnosis Date    Anxiety     Gastro-oesophageal reflux disease     Obesity (BMI 30-39.9)     Obstructive sleep apnea (adult) (pediatric)     panic attacks, and  "anxiety  1/5/2006    stable on paxil    PONV (postoperative nausea and vomiting)     Pure hypercholesterolemia     Unspecified essential hypertension      PAST SURGICAL HISTORY:  Past Surgical History:   Procedure Laterality Date    COLONOSCOPY N/A 12/29/2020    Procedure: COLONOSCOPY;  Surgeon: Frank Echeverria MD;  Location: [facility]    CV CORONARY ANGIOGRAM N/A 6/15/2023    Procedure: Coronary Angiogram;  Surgeon: Alex Bates MD;  Location:  [facility]    CV LEFT VENTRICULOGRAM N/A 6/15/2023    Procedure: Left Ventriculogram;  Surgeon: Alex Bates MD;  Location:  [facility]    ESOPHAGOSCOPY, GASTROSCOPY, DUODENOSCOPY (EGD), COMBINED  12/17/2012    Procedure: COMBINED ESOPHAGOSCOPY, GASTROSCOPY, DUODENOSCOPY (EGD);  Gastroscopy  ;  Surgeon: Luc Smith MD;  Location: [facility]    ESOPHAGOSCOPY, GASTROSCOPY, DUODENOSCOPY (EGD), COMBINED N/A 12/29/2020    Procedure: ESOPHAGOGASTRODUODENOSCOPY, WITH BIOPSY;  Surgeon: Frank Echeverria MD;  Location: [facility]    HERNIA REPAIR      PROCURE BONE MARROW  1/3/2014    Procedure: PROCURE BONE MARROW;  Bone Marrow [facility];  Surgeon: Chaz Negro MD;  Location: [facility]    SURGICAL HISTORY OF -       excision of bone tumor    SURGICAL HISTORY OF -   12/00    vasectomy     SOCIAL HISTORY:  Social History     Socioeconomic History    Marital status:      Spouse name: None    Number of children: None    Years of education: None    Highest education level: None   Tobacco Use    Smoking status: Never    Smokeless tobacco: Never   Vaping Use    Vaping Use: Never used   Substance and Sexual Activity    Alcohol use: No     Comment: rare    Drug use: No    Sexual activity: Yes     Partners: Female   Other Topics Concern    Parent/sibling w/ CABG, MI or angioplasty before 65F 55M? No       Physical Exam:  Vitals: BP (!) 143/94 (BP Location: Right arm, Patient Position: Sitting)   Pulse 62   Ht 1.93 m (6' 4\")   Wt (!) " 151.4 kg (333 lb 12.8 oz)   SpO2 94%   BMI 40.63 kg/m     Wt Readings from Last 4 Encounters:   07/21/23 (!) 151.4 kg (333 lb 12.8 oz)   06/15/23 (!) 151.8 kg (334 lb 9.6 oz)   06/12/23 147.5 kg (325 lb 3.2 oz)   02/21/23 (!) 152.4 kg (336 lb)     GEN: well nourished, in no acute distress.  EXTREM: no LE edema.    Janice Torres, RODY  [facility]

## 2023-07-21 NOTE — LETTER
7/21/2023    Ashley Crawley, DO  [address] [address]    RE: Amarjit VILLA [place]       Dear Colleague,     I had the pleasure of seeing Amarjit Malik in the [facility].    Cardiology Progress Note    Patient seen today in follow up of: post angiogram  Primary cardiologist: Dr. Bates    HPI:  Amarjit Malik is a very pleasant 53 year old male with a history of Hypertension, hyperlipidemia, obesity, and obstructive sleep apnea who was recently referred to cardiology clinic after a Ziopatch showed a 13 beat run of VT.    He was seen in consultation by Dr. Bates on 6/12/23.  The Ziopatch was done for lightheadedness, dizziness and some shortness of breath.  He had some occasional chest discomfort not precipitated by exertion.  His symptoms to him to be somewhat worse in the last few months.  He previously was on atenolol but had been taken off of this many years ago.  He was restarted on a beta-blocker (Metoprolol XL 50 mg daily) and referred for coronary angiography for further evaluation.  Of note, on the monitor, his symptoms correlated with sinus rhythm or sinus tachycardia. He did have a stress echocardiogram on 5/27/2022 which showed no evidence of ischemia.  His resting echocardiogram was normal.    He underwent coronary angiography with Dr. Bates on 6/15/2023. He was found to have mild nonobstructive CAD with a 30% ostial/proximal LAD lesions and 10% proximal/mid LAD lesion. LV function was hyperdynamic on LV gram.    He returns today follow up. He reports feeling significantly better since starting Metoprolol. His dyspnea has resolved. No lightheadedness/dizziness. He reports feeling unwell after he was started on hydrochlorothiazide with fatigue and presyncope. HRs have been stable in the 70 - 80s. BPs have been mildly elevated in the 140 systolic typically. He notes his femoral access is well healed without ecchymosis or bruit.    ASSESSMENT/PLAN:  Amarjit Malik is a  very pleasant 53 year old male with a history of RUTHIE, hyperlpidemia, hypertension and obesity is here today for follow-up after his angiogram.  As noted, he recently was experiencing symptoms of some lightheadedness and shortness of breath for which he underwent monitoring with a Zio patch.  He was found to have a 13 beat run of ventricular tachycardia which was asymptomatic.  He was restarted on beta-blockers with metoprolol XL 50 mg daily and was set up for coronary angiography.     We reviewed his coronary angiogram which showed mild nonobstructive coronary artery disease in the LAD.  No LV dysfunction noted on LV gram and he had a normal stress echo last year. Discussed the importance of risk factor modification including management of his hyperlipidemia, hypertension, weight loss, diet and exercise.  He is on Crestor 10 mg daily and aspirin 81 mg daily.  Last LDL was in the 90s in February of this year.  Will increase Crestor to 20 mg daily.  If he has any issues on that dose, he will asked him to let us know.  Repeat lipid panel can bed done at his annual physical.     Blood pressures have been mildly elevated and remain mildly elevated today.  He is on metoprolol XL 50 mg daily.  He previously was on 100 mg of losartan.  We will restart as a smaller dose of 25 mg daily.  He thinks he had side effects from hydrochlorothiazide so we will avoid that.  He plans to watch his blood pressure over the next few weeks.  He can monitor this at work.  If blood pressures are consistently greater than 130/80, I asked him to contact his PCP or our clinic.  Losartan can be increased as needed.    It was a pleasure meeting Amarjit in clinic today. I am glad his symptoms have improved. If he has any recurrent new symptoms or recurrent shortness of breath, I asked him to contact us.     Orders this Visit:  No orders of the defined types were placed in this encounter.    Orders Placed This Encounter   Medications    rosuvastatin  (CRESTOR) 20 MG tablet     Sig: Take 1 tablet (20 mg) by mouth daily     Dispense:  90 tablet     Refill:  1    losartan (COZAAR) 25 MG tablet     Sig: Take 1 tablet (25 mg) by mouth daily     Dispense:  90 tablet     Refill:  1     Medications Discontinued During This Encounter   Medication Reason    hydrochlorothiazide (HYDRODIURIL) 12.5 MG tablet     rosuvastatin (CRESTOR) 10 MG tablet Reorder (No AVS)    losartan (COZAAR) 100 MG tablet        CURRENT MEDICATIONS:  Current Outpatient Medications   Medication Sig Dispense Refill    diclofenac (VOLTAREN) 50 MG EC tablet Take 1 tablet (50 mg) by mouth 2 times daily as needed for moderate pain (4-6) 180 tablet 3    hydrOXYzine (ATARAX) 25 MG tablet TAKE ONE TO TWO TABLETS BY MOUTH UP TO THREE TIMES DAILY AS NEEDED AND TWO TO FOUR TABLETS BY MOUTH AT NIGHT FOR SLEEP AND ANXIETY 30 tablet 11    losartan (COZAAR) 25 MG tablet Take 1 tablet (25 mg) by mouth daily 90 tablet 1    metoprolol succinate ER (TOPROL XL) 50 MG 24 hr tablet Take 1 tablet (50 mg) by mouth daily 90 tablet 3    ORDER FOR DME Auto-CPAP:  Max 15 cm H2O  Min 8 cm H2O    Continuous    Lifetime need and heated humidity.    1 each 0    pantoprazole (PROTONIX) 40 MG EC tablet Take 1 tablet (40 mg) by mouth daily 90 tablet 3    PARoxetine (PAXIL) 10 MG tablet TAKE ONE TABLET BY MOUTH EVERY MORNING TAKE IN ADDITION THE THE ONE 40MG TABLET FOR A TOTAL DOSE OF 50MG 90 tablet 3    PARoxetine (PAXIL) 40 MG tablet Take 1 tablet (40 mg) by mouth At Bedtime 90 tablet 3    rosuvastatin (CRESTOR) 20 MG tablet Take 1 tablet (20 mg) by mouth daily 90 tablet 1    testosterone cypionate (DEPOTESTOSTERONE) 200 MG/ML injection Inject 0.5 mLs (100 mg) into the muscle every 14 days 3 mL 5    aspirin (ASA) 81 MG chewable tablet Take 81 mg by mouth daily Took 4 baby ASA x 3 days for angiogram today (Patient not taking: Reported on 7/21/2023)       ALLERGIES  Allergies   Allergen Reactions    Zantac      flushing     PAST  MEDICAL HISTORY:  Past Medical History:   Diagnosis Date    Anxiety     Gastro-oesophageal reflux disease     Obesity (BMI 30-39.9)     Obstructive sleep apnea (adult) (pediatric)     panic attacks, and anxiety  1/5/2006    stable on paxil    PONV (postoperative nausea and vomiting)     Pure hypercholesterolemia     Unspecified essential hypertension      PAST SURGICAL HISTORY:  Past Surgical History:   Procedure Laterality Date    COLONOSCOPY N/A 12/29/2020    Procedure: COLONOSCOPY;  Surgeon: Frank Echeverria MD;  Location: [facility]    CV CORONARY ANGIOGRAM N/A 6/15/2023    Procedure: Coronary Angiogram;  Surgeon: Alex Bates MD;  Location:  [facility]    CV LEFT VENTRICULOGRAM N/A 6/15/2023    Procedure: Left Ventriculogram;  Surgeon: Alex Bates MD;  Location:  [facility]    ESOPHAGOSCOPY, GASTROSCOPY, DUODENOSCOPY (EGD), COMBINED  12/17/2012    Procedure: COMBINED ESOPHAGOSCOPY, GASTROSCOPY, DUODENOSCOPY (EGD);  Gastroscopy  ;  Surgeon: Luc Smith MD;  Location: [facility]    ESOPHAGOSCOPY, GASTROSCOPY, DUODENOSCOPY (EGD), COMBINED N/A 12/29/2020    Procedure: ESOPHAGOGASTRODUODENOSCOPY, WITH BIOPSY;  Surgeon: Frank Echeverria MD;  Location: [facility]    HERNIA REPAIR      PROCURE BONE MARROW  1/3/2014    Procedure: PROCURE BONE MARROW;  Bone Marrow [facility];  Surgeon: Chaz Negro MD;  Location: [facility]    SURGICAL HISTORY OF -       excision of bone tumor    SURGICAL HISTORY OF -   12/00    vasectomy     SOCIAL HISTORY:  Social History     Socioeconomic History    Marital status:      Spouse name: None    Number of children: None    Years of education: None    Highest education level: None   Tobacco Use    Smoking status: Never    Smokeless tobacco: Never   Vaping Use    Vaping Use: Never used   Substance and Sexual Activity    Alcohol use: No     Comment: rare    Drug use: No    Sexual activity: Yes     Partners: Female   Other Topics Concern  "   Parent/sibling w/ CABG, MI or angioplasty before 65F 55M? No       Physical Exam:  Vitals: BP (!) 143/94 (BP Location: Right arm, Patient Position: Sitting)   Pulse 62   Ht 1.93 m (6' 4\")   Wt (!) 151.4 kg (333 lb 12.8 oz)   SpO2 94%   BMI 40.63 kg/m     Wt Readings from Last 4 Encounters:   07/21/23 (!) 151.4 kg (333 lb 12.8 oz)   06/15/23 (!) 151.8 kg (334 lb 9.6 oz)   06/12/23 147.5 kg (325 lb 3.2 oz)   02/21/23 (!) 152.4 kg (336 lb)     GEN: well nourished, in no acute distress.  EXTREM: no LE edema.    Janice Torres PA-C  [facility]      Thank you for allowing me to participate in the care of your patient.      Sincerely,     Janice Torres PA-C     [facility]  cc:   No referring provider defined for this encounter.      "

## 2023-07-21 NOTE — PATIENT INSTRUCTIONS
"Thank you for your [facility] visit today. Your provider has recommended the following:  Medication Changes:  RESTART losartan 25 mg daily.   INCREASE Crestor to 20 mg daily. You can take 2 of the 20 mg tablets you have at home and then I sent in a new prescription for that dose. If leg cramps or any other issues, let me know.   Recommendations:  Watch your blood pressures over the next few weeks. If blood pressures are still > 130/80 in a few weeks, we can increase your losartan if needed.   Reminder:  Please bring in all current medications, over the counter supplements and vitamin bottles to your next appointment.  Important \"Eliseo Dennis\" telephone numbers for your reference:  Cardiology Scheduling - [phone]  Cardiology Clinic RN-  [phone]     [facility]    "

## 2023-07-25 ENCOUNTER — TELEPHONE (OUTPATIENT)
Dept: FAMILY MEDICINE | Facility: CLINIC | Age: 53
End: 2023-07-25
Payer: COMMERCIAL

## 2023-07-25 NOTE — TELEPHONE ENCOUNTER
Patient Quality Outreach    Patient is due for the following:   Hypertension -  BP check    Next Steps:   Schedule a nurse only visit for BP    Type of outreach:    Sent letter.    Next Steps:  Reach out within 90 days via Letter.    Max number of attempts reached: Yes. Will try again in 90 days if patient still on fail list.    Questions for provider review:    None           Chasity Self MA  Chart routed to .

## 2023-09-25 DIAGNOSIS — E29.1 HYPOGONADISM MALE: ICD-10-CM

## 2023-09-26 RX ORDER — TESTOSTERONE CYPIONATE 200 MG/ML
100 INJECTION, SOLUTION INTRAMUSCULAR
Qty: 3 ML | Refills: 5 | Status: SHIPPED | OUTPATIENT
Start: 2023-09-26 | End: 2024-03-15

## 2023-10-17 NOTE — PROGRESS NOTES
3 DAY STM VISIT    Patient contacted for 3 day STM visit  Message left for patient to return call     Device type: Auto-CPAP  PAP settings: CPAP min 8 cm  H20     CPAP max 15 cm  H20  Assessment: No usage reporting. Pt hasn't started using yet  Action plan: Pt to have f/u 14 day STM visit.   Yes

## 2023-11-18 NOTE — NURSING NOTE
"Initial BP (!) 124/92 (BP Location: Right arm, Patient Position: Chair, Cuff Size: Adult Large)  Pulse 72  Ht 6' 4\" (1.93 m)  Wt 315 lb (142.9 kg)  SpO2 96%  BMI 38.34 kg/m2 Estimated body mass index is 38.34 kg/(m^2) as calculated from the following:    Height as of this encounter: 6' 4\" (1.93 m).    Weight as of this encounter: 315 lb (142.9 kg). .    Jenny Hernadez    " no chest pain, no cough, and no shortness of breath.

## 2023-12-19 ENCOUNTER — TELEPHONE (OUTPATIENT)
Dept: FAMILY MEDICINE | Facility: CLINIC | Age: 53
End: 2023-12-19
Payer: COMMERCIAL

## 2023-12-19 NOTE — TELEPHONE ENCOUNTER
Patient Quality Outreach    Patient is due for the following:   Hypertension -  BP check  Physical Preventive Adult Physical    Next Steps:   Schedule a Adult Preventative    Type of outreach:    Sent letter.    Next Steps:  Reach out within 90 days via Letter.    Max number of attempts reached: Yes. Will try again in 90 days if patient still on fail list.    Questions for provider review:    None           Chasity Self MA  Chart routed to .

## 2024-01-30 ENCOUNTER — MYC REFILL (OUTPATIENT)
Dept: CARDIOLOGY | Facility: CLINIC | Age: 54
End: 2024-01-30
Payer: COMMERCIAL

## 2024-01-30 DIAGNOSIS — I25.10 CAD (CORONARY ARTERY DISEASE): Primary | ICD-10-CM

## 2024-01-30 DIAGNOSIS — E78.5 HYPERLIPIDEMIA LDL GOAL <130: ICD-10-CM

## 2024-01-30 DIAGNOSIS — I10 BENIGN ESSENTIAL HYPERTENSION: Chronic | ICD-10-CM

## 2024-01-31 RX ORDER — ROSUVASTATIN CALCIUM 20 MG/1
20 TABLET, COATED ORAL DAILY
Qty: 90 TABLET | Refills: 1 | Status: SHIPPED | OUTPATIENT
Start: 2024-01-31 | End: 2024-03-15

## 2024-01-31 RX ORDER — LOSARTAN POTASSIUM 25 MG/1
25 TABLET ORAL DAILY
Qty: 90 TABLET | Refills: 1 | Status: SHIPPED | OUTPATIENT
Start: 2024-01-31 | End: 2024-03-15

## 2024-01-31 NOTE — TELEPHONE ENCOUNTER
[facility] Cardiology Refill Guideline reviewed. Pt was instructed to schedule his annual follow up w/Dr. Bates w/labs for July as Lori is leaving the practice and pt will need new rx after that. Medication meets criteria for refill. Venessa Juarez RN on 1/31/2024 at 9:25 AM

## 2024-03-11 ENCOUNTER — VIRTUAL VISIT (OUTPATIENT)
Dept: SLEEP MEDICINE | Facility: CLINIC | Age: 54
End: 2024-03-11
Payer: COMMERCIAL

## 2024-03-11 VITALS — BODY MASS INDEX: 38.36 KG/M2 | WEIGHT: 315 LBS | HEIGHT: 76 IN

## 2024-03-11 DIAGNOSIS — G47.33 OSA (OBSTRUCTIVE SLEEP APNEA): Primary | ICD-10-CM

## 2024-03-11 DIAGNOSIS — G47.52 DREAM ENACTMENT BEHAVIOR: ICD-10-CM

## 2024-03-11 PROCEDURE — 99203 OFFICE O/P NEW LOW 30 MIN: CPT | Mod: 95

## 2024-03-11 ASSESSMENT — SLEEP AND FATIGUE QUESTIONNAIRES
HOW LIKELY ARE YOU TO NOD OFF OR FALL ASLEEP IN A CAR, WHILE STOPPED FOR A FEW MINUTES IN TRAFFIC: WOULD NEVER DOZE
HOW LIKELY ARE YOU TO NOD OFF OR FALL ASLEEP WHILE SITTING AND READING: WOULD NEVER DOZE
HOW LIKELY ARE YOU TO NOD OFF OR FALL ASLEEP WHILE SITTING INACTIVE IN A PUBLIC PLACE: WOULD NEVER DOZE
HOW LIKELY ARE YOU TO NOD OFF OR FALL ASLEEP WHEN YOU ARE A PASSENGER IN A CAR FOR AN HOUR WITHOUT A BREAK: SLIGHT CHANCE OF DOZING
HOW LIKELY ARE YOU TO NOD OFF OR FALL ASLEEP WHILE WATCHING TV: WOULD NEVER DOZE
HOW LIKELY ARE YOU TO NOD OFF OR FALL ASLEEP WHILE LYING DOWN TO REST IN THE AFTERNOON WHEN CIRCUMSTANCES PERMIT: SLIGHT CHANCE OF DOZING
HOW LIKELY ARE YOU TO NOD OFF OR FALL ASLEEP WHILE SITTING QUIETLY AFTER LUNCH WITHOUT ALCOHOL: WOULD NEVER DOZE
HOW LIKELY ARE YOU TO NOD OFF OR FALL ASLEEP WHILE SITTING AND TALKING TO SOMEONE: SLIGHT CHANCE OF DOZING

## 2024-03-11 ASSESSMENT — PAIN SCALES - GENERAL: PAINLEVEL: NO PAIN (0)

## 2024-03-11 NOTE — PATIENT INSTRUCTIONS
Tips to safeguard your sleeping environment to prevent injury from dream enactment:    Move hard or sharp objects or clutter away from the bedside (tables, etc).  Put pillows between you and a bed partner.  Use padded side rails on the bed to prevent falling out of bed, or move the mattress to the floor so there is not far to fall.  Pad the floor near the bed  Sleep in a sleeping bag to reduce how far or hard you can kick/punch.  Remove dangerous objects from the bedroom, such as sharp items and weapons  Protect bedroom windows to prevent you from being able to go out the window in your sleep.  Possibly sleep in a separate bed or room from your bed partner until symptoms are controlled

## 2024-03-11 NOTE — NURSING NOTE
Is the patient currently in the state of [place]? YES    Visit mode:VIDEO    If the visit is dropped, the patient can be reconnected by: VIDEO VISIT: Send to e-mail at: [email or web address]    Will anyone else be joining the visit? NO  (If patient encounters technical issues they should call [phone])    How would you like to obtain your AVS? MyChart    Are changes needed to the allergy or medication list? Pt stated no changes to allergies and Pt stated no med changes    Reason for visit: RECHECK  Has patient had flu shot for current/most recent flu season? If so, when? Yes: 10/09/2023    Kristy TAMEZ

## 2024-03-11 NOTE — PROGRESS NOTES
Virtual Visit Details  Type of service: Video Visit   Start time: 3:11 PM  End time: 3:33 PM   Originating Location (pt. Location): Home  Distant Location (provider location): On-site  Platform used for Video Visit: [facility]    Outpatient Sleep Medicine Consultation:      Name: Amarjit Malik MRN# [id number]   Age: 53 year old YOB: 1970     Date of Consultation: March 11, 2024  Consultation is requested by: No referring provider defined for this encounter. No ref. provider found  Primary care provider: Ashley Crawley       Reason for Sleep Consult:     Amarjit Malik is sent by No ref. provider found for a sleep consultation regarding previously diagnosed RUTHIE.    Patient s Reason for visit  Amarjit Malik main reason for visit:    Patient states problem(s) started:    Amarjit Malik's goals for this visit:             Assessment and Plan:     Summary Sleep Diagnoses:  (G47.33) RUTHIE (obstructive sleep apnea) (primary encounter diagnosis)  Comment: Amarjit is a 53-year-old male who presents to the sleep clinic to reestablish care for previously diagnosed severe RUTHIE. His LOV was on 09/03/2020 with Bennett Goltz, PA-C. Amarjit is here to renew his prescription for supplies. He is also interested in a new CPAP machine. He uses his CPAP every night and feels great benefit from use. He had significant daytime sleepiness prior to using CPAP. His 30-day download shows excellent compliance. He has a travel machine which explains why his download shows only 23/30 days of use. His apnea is well controlled with a residual AHI of 2.6 events per hour. His leak is acceptable. He is tolerating the CPAP pressures.    Plan: Comprehensive DME  Continue auto-PAP 8-15 cmH2O. A prescription was written for new supplies as well as a replacement machine with the same settings. We reviewed recommendations for cleaning and replacing supplies.     (G47.52) Dream enactment behavior  Comment: Amarjit reports dream enactment behavior  that occurs about once per month. He says dream enactment occurred long before the time he started taking paroxetine. He has been taking paroxetine for probably around 20 years. Dream enactment started in his teens. He will kick the wall, and he has fallen out of bed while kicking his legs. Behaviors correlate with dream content. He denies anosmia.      Plan: We reviewed tips to create a safer sleeping environment. Amarjit is going to try melatonin to see if behaviors subside. We discussed starting at 3 mg and increasing in 3 mg increments as needed. Informed Amarjit clonazepam would be the next treatment option if he had an insufficient response to the melatonin.     Comorbid Diagnoses:  Obesity, GERD, HTN, ERIC, palpitations/V-tach    Summary Recommendations:  Orders Placed This Encounter   Procedures    Comprehensive DME     Summary Counseling:    Sleep Testing Reviewed  Obstructive Sleep Apnea Reviewed  Complications of Untreated Sleep Apnea Reviewed    Patient will follow up in 1 year or sooner if a compliance visit is required.   ELICEO Lawson CNP    Total time spent reviewing medical records, history and physical examination, review of previous testing and interpretation as well as documentation on this date: 40 minutes     CC: No ref. provider found          History of Present Illness:     Amarjit presents to the sleep clinic to reestablish care for previously diagnosed severe RUTHIE. His LOV was on 09/03/2020 with Bennett Goltz, PA-C.      Past Sleep Evaluations: PSG at [facility] on 02/11/2007 at 295 lbs. revealed an AHI of 54 and RDI of 63 with O2 sat corey of 84%. The patient was titrated to a CPAP pressure of 9 cmH2O.    Snoring: No   Mask Leak: No   Type of Mask: nasal pillows   Dry Nose or Mouth: Occasionally, he uses saline nasal spray   Condensation in hose or mask: No   Nasal/Skin irritation: No     He is tolerating the CPAP pressure.     He uses the water chamber most of the time. He also has a  travel CPAP machine.      DME: [facility]    AirSense 10 (set up 12/07/2017)   Auto-PAP 8-15 cmH2O: 02/07/2024-03/07/2024  The compliance data shows that the patient used the CPAP for 23/30 nights, 73% of nights for >4 hours.  The 95th% pressure is 13.3 cm.  The 95th% leak is 22.0 lpm.  The average nightly usage is 7 hours 24 minutes.  The average AHI is 2.6 events/hr.     SLEEP-WAKE SCHEDULE:     Work/School Days: Patient goes to school/work: Yes   Usually gets into bed at 9-10 PM   Takes patient about <15 minutes to fall asleep  Has trouble falling asleep 0 nights per week  Wakes up in the middle of the night 1-2 times.  Wakes up due to use the bathroom, typically only once   He has trouble falling back asleep   times a week.   It usually takes   to get back to sleep  Patient is usually up at 7:30 AM  Uses alarm: Yes     Weekends/Non-work Days/All Other Days:  Typically the same schedule on the weekends.    Sleep Need  Patient gets 7-8 hours of sleep on average   Patient thinks he needs about 7-8 hours of sleep    Amarjit DAISY Malik prefers to sleep in this position(s):     Patient states they do the following activities in bed:      Naps  Patient takes a purposeful nap 0 times a week and naps are usually   in duration  He feels better after a nap: NA  He dozes off unintentionally 0 days per week  Patient has had a driving accident or near-miss due to sleepiness/drowsiness: No    SLEEP DISRUPTIONS:    Breathing/Snoring  Patient snores: No  Other people complain about his snoring: No   Patient has been told he stops breathing in his sleep: No   He has issues with the following: He occasionally gets a morning headache in the fall and winter with pressure changes.     Movement:  Patient gets pain, discomfort, with an urge to move: No. He denies restless legs.   It happens when he is resting:   It happens more at night: Yes   Patient has been told he kicks his legs at night: Yes, his wife says he will kick in the night.       Behaviors in Sleep:  Amarjit Malik has experienced the following behaviors while sleeping: He will occasionally have dream enactment where he kicks the wall. Behaviors are associated with dream contact. Dream enactment occurs probably about once per month. He has fallen out of bed and kicked himself out of bed. He said dream enactment occurred long before the time he started taking paroxetine. He denies anosmia.     He has experienced sudden muscle weakness during the day: No  Pt denies bruxism, sleep talking, and sleep walking. Pt denies sleep paralysis, hypnagogue and cataplexy.    Is there anything else you would like your sleep provider to know:      CAFFEINE AND OTHER SUBSTANCES:    Patient consumes caffeinated beverages per day: coffee and Diet Coke   Last caffeine use is usually: He doesn't always cut it off at a certain time.   List of any prescribed or over the counter stimulants that patient takes: None   List of any prescribed or over the counter sleep medication patient takes: None  List of previous sleep medications that patient has tried:    Patient drinks alcohol to help them sleep: No  Patient drinks alcohol near bedtime: No     Family History:  Patient has a family member been diagnosed with a sleep disorder: Yes, father, paternal grandmother, and brothers all have apnea.        Social History: He lives at home with his wife. He has worked as a nurse in informatics.    SCALES:    EPWORTH SLEEPINESS SCALE         3/11/2024     2:53 PM    Girard Sleepiness Scale ( CHIQUIS Mcghee  [id number]<br>ESS - [place]/English - Final version - 21 Nov 07 - [facility].)   Sitting and reading Would never doze   Watching TV Would never doze   Sitting, inactive in a public place (e.g. a theatre or a meeting) Would never doze   As a passenger in a car for an hour without a break Slight chance of dozing   Lying down to rest in the afternoon when circumstances permit Slight chance of dozing   Sitting and  talking to someone Slight chance of dozing   Sitting quietly after a lunch without alcohol Would never doze   In a car, while stopped for a few minutes in traffic Would never doze   [place] Score (MC) 3   Ar[place] Score (Sleep) 3         INSOMNIA SEVERITY INDEX (NORI)          3/11/2024     2:54 PM   Insomnia Severity Index (NORI)   Difficulty falling asleep 0   Difficulty staying asleep 0   Problems waking up too early 0   How SATISFIED/DISSATISFIED are you with your CURRENT sleep pattern? 2   How NOTICEABLE to others do you think your sleep problem is in terms of impairing the quality of your life? 0   How WORRIED/DISTRESSED are you about your current sleep problem? 0   To what extent do you consider your sleep problem to INTERFERE with your daily functioning (e.g. daytime fatigue, mood, ability to function at work/daily chores, concentration, memory, mood, etc.) CURRENTLY? 1   NORI Total Score 3       Guidelines for Scoring/Interpretation:  Total score categories:  0-7 = No clinically significant insomnia   8-14 = Subthreshold insomnia   15-21 = Clinical insomnia (moderate severity)  22-28 = Clinical insomnia (severe)  Used via courtesy of www.Zerveth.va.gov with permission from Larry Hong PhD., [facility]      STOP BANG         3/11/2024     2:50 PM   STOP BANG Questionnaire (  2008, the American Society of Anesthesiologists, Inc. [employer]   BMI Clinic: 38.95         GAD7        2/21/2023     8:42 AM   ERIC-7    1. Feeling nervous, anxious, or on edge 0   2. Not being able to stop or control worrying 0   3. Worrying too much about different things 0   4. Trouble relaxing 1   5. Being so restless that it is hard to sit still 0   6. Becoming easily annoyed or irritable 1   7. Feeling afraid, as if something awful might happen 0   ERIC-7 Total Score 2   If you checked any problems, how difficult have they made it for you to do your work, take care of things at home, or get along  "with other people? Not difficult at all         CAGE-AID         No data to display                CAGE-AID reprinted with permission from the [facility] Journal, GURMEET Wilcox. and LORETTA Anton, \"Conjoint screening questionnaires for alcohol and drug abuse\" [place] Medical Journal 94: 135-140, 1995.      PATIENT HEALTH QUESTIONNAIRE-9 (PHQ - 9)        8/11/2020    12:32 PM   PHQ-9 ([employer])   1.  Little interest or pleasure in doing things 1   2.  Feeling down, depressed, or hopeless 0   3.  Trouble falling or staying asleep, or sleeping too much 1   4.  Feeling tired or having little energy 1   5.  Poor appetite or overeating 0   6.  Feeling bad about yourself - or that you are a failure or have let yourself or your family down 0   7.  Trouble concentrating on things, such as reading the newspaper or watching television 0   8.  Moving or speaking so slowly that other people could have noticed. Or the opposite - being so fidgety or restless that you have been moving around a lot more than usual 0   9.  Thoughts that you would be better off dead, or of hurting yourself in some way 0   PHQ-9 Total Score 3   If you checked off any problems, how difficult have these problems made it for you to do your work, take care of things at home, or get along with other people? Not difficult at all   6.  Feeling bad about yourself 0   7.  Trouble concentrating 0   8.  Moving slowly or restless 0   9.  Suicidal or self-harm thoughts 0   Difficulty at work, home, or with people Not difficult at all       Developed by Elizabeth Garcia, Sumaya Brooke, Gerardo Julio and colleagues, with an educational love from [employer] No permission required to reproduce, translate, display or distribute.        Allergies:    Allergies   Allergen Reactions    Zantac      flushing       Medications:    Current Outpatient Medications   Medication Sig Dispense Refill    diclofenac (VOLTAREN) 50 MG EC tablet Take 1 tablet (50 mg) by " mouth 2 times daily as needed for moderate pain (4-6) 180 tablet 3    hydrOXYzine (ATARAX) 25 MG tablet TAKE ONE TO TWO TABLETS BY MOUTH UP TO THREE TIMES DAILY AS NEEDED AND TWO TO FOUR TABLETS BY MOUTH AT NIGHT FOR SLEEP AND ANXIETY 30 tablet 11    losartan (COZAAR) 25 MG tablet Take 1 tablet (25 mg) by mouth daily 90 tablet 1    metoprolol succinate ER (TOPROL XL) 50 MG 24 hr tablet Take 1 tablet (50 mg) by mouth daily 90 tablet 3    ORDER FOR DME Auto-CPAP:  Max 15 cm H2O  Min 8 cm H2O    Continuous    Lifetime need and heated humidity.    1 each 0    pantoprazole (PROTONIX) 40 MG EC tablet Take 1 tablet (40 mg) by mouth daily 90 tablet 3    PARoxetine (PAXIL) 10 MG tablet TAKE ONE TABLET BY MOUTH EVERY MORNING TAKE IN ADDITION THE THE ONE 40MG TABLET FOR A TOTAL DOSE OF 50MG 90 tablet 3    PARoxetine (PAXIL) 40 MG tablet Take 1 tablet (40 mg) by mouth At Bedtime 90 tablet 3    rosuvastatin (CRESTOR) 20 MG tablet Take 1 tablet (20 mg) by mouth daily 90 tablet 1    testosterone cypionate (DEPOTESTOSTERONE) 200 MG/ML injection INJECT 0.5 MLS (100 MG) INTO THE MUSCLE EVERY 14 DAYS 3 mL 5       Problem List:  Patient Active Problem List    Diagnosis Date Noted    V-tach (H) 06/15/2023     Priority: Medium    Status post coronary angiogram 06/15/2023     Priority: Medium    Morbid obesity (H) 07/31/2018     Priority: Medium    Donor of stem cell 01/03/2014     Priority: Medium    Obstructive sleep apnea      Priority: Medium    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        Gastroesophageal reflux disease without esophagitis 06/16/2007     Priority: Medium      symptoms stable on protonix - last EGD shows hiatal hernia but no other findings  - continue meds.  2018 - Discussed long term risk/benefits.  Benefits > risk, patient opts to continue, failed trial off PPI      Hypersomnia with sleep apnea 01/08/2006     Priority: Medium     noted apnea/snoring on sleeping - sleep study. Wears CPAP regularly            Past Medical/Surgical History:  Past Medical History:   Diagnosis Date    Anxiety     Gastro-oesophageal reflux disease     Obesity (BMI 30-39.9)     Obstructive sleep apnea (adult) (pediatric)     panic attacks, and anxiety  1/5/2006    stable on paxil    PONV (postoperative nausea and vomiting)     Pure hypercholesterolemia     Unspecified essential hypertension      Past Surgical History:   Procedure Laterality Date    COLONOSCOPY N/A 12/29/2020    Procedure: COLONOSCOPY;  Surgeon: Frank Echeverria MD;  [facility]    CV CORONARY ANGIOGRAM N/A 06/15/2023    Procedure: Coronary Angiogram;  Surgeon: Alex Bates MD;  Location: [facility]ARD[facility]    CV LEFT VENTRICULOGRAM N/A 06/15/2023    Procedure: Left Ventriculogram;  Surgeon: Alex Bates MD;  Location: [facility] CARDIAC CATH LAB    ESOPHAGOSCOPY, GASTROSCOPY, DUODENOSCOPY (EGD), COMBINED  12/17/2012    Procedure: COMBINED ESOPHAGOSCOPY, GASTROSCOPY, DUODENOSCOPY (EGD);  Gastroscopy  ;  Surgeon: Luc Smith MD;  Location: [facility]    ESOPHAGOSCOPY, GASTROSCOPY, DUODENOSCOPY (EGD), COMBINED N/A 12/29/2020    Procedure: ESOPHAGOGASTRODUODENOSCOPY, WITH BIOPSY;  Surgeon: Frank Echeverria MD;  Location: [facility]    HERNIA REPAIR      PROCURE BONE MARROW  01/03/2014    Procedure: PROCURE BONE MARROW;  Bone Marrow [place] Donor;  Surgeon: Chaz Negro MD;  Location: [facility]    SURGICAL HISTORY OF -       excision of bone tumor    SURGICAL HISTORY OF -   12/01/2000    vasectomy       Social History:  Social History     Socioeconomic History    Marital status:      Spouse  "name: Not on file    Number of children: Not on file    Years of education: Not on file    Highest education level: Not on file   Occupational History    Not on file   Tobacco Use    Smoking status: Never    Smokeless tobacco: Never   Vaping Use    Vaping Use: Never used   Substance and Sexual Activity    Alcohol use: Yes     Comment: Maybe once per year    Drug use: No    Sexual activity: Yes     Partners: Female   Other Topics Concern    Parent/sibling w/ CABG, MI or angioplasty before 65F 55M? No   Social History Narrative    Not on file     Social Determinants of Health     Financial Resource Strain: Not on file   Food Insecurity: Not on file   Transportation Needs: Not on file   Physical Activity: Not on file   Stress: Not on file   Social Connections: Not on file   Interpersonal Safety: Not on file   Housing Stability: Not on file       Family History:  Family History   Problem Relation Age of Onset    Diabetes Mother         type 2    Hypertension Father     Sleep Apnea Father     Sleep Apnea Paternal Grandmother     Prostate Cancer Paternal Grandfather     Kidney Cancer Paternal Grandfather     Diabetes Brother     Sleep Apnea Brother     Sleep Apnea Brother     Heart Disease Paternal Uncle         MI at 47 yo    C.A.D. Other         paternal uncle  at 45 of MI    Cancer - colorectal No family hx of        Review of Systems:  A complete review of systems reviewed by me is negative with the exeption of what has been mentioned in the history of present illness.    Physical Examination:  Vitals: Ht 1.93 m (6' 4\")   Wt 145.2 kg (320 lb)   BMI 38.95 kg/m    BMI= Body mass index is 38.95 kg/m .    GENERAL APPEARANCE: healthy, alert, no distress, and cooperative  EYES: Eyes grossly normal to inspection  NECK: no asymmetry, masses, or scars  RESP: no increased work of breathing noted, no audible cough or wheeze   NEURO: mentation intact and speech normal  PSYCH: mentation appears normal and affect " "normal/bright  Mallampati Class: Not visualized over video.  Tonsillar Stage: Not visualized over video.         Data: All pertinent previous laboratory data reviewed     Recent Labs   Lab Test 06/15/23  0842 02/21/23  0957    140   POTASSIUM 4.1 4.3   CHLORIDE 103 104   CO2 28 25   ANIONGAP 10 11   * 103*   BUN 16.7 16.5   CR 0.97 0.96   RAH 9.2 9.4       Recent Labs   Lab Test 06/15/23  0842   WBC 6.1   RBC 5.31   HGB 15.7   HCT 47.0   MCV 89   MCH 29.6   MCHC 33.4   RDW 13.4          Recent Labs   Lab Test 02/21/23  0957   PROTTOTAL 7.5   ALBUMIN 4.2   BILITOTAL 0.6   ALKPHOS 79   AST 32   ALT 42       TSH (mU/L)   Date Value   04/07/2022 0.84   07/17/2013 1.94       No results found for: \"UAMP\", \"UBARB\", \"BENZODIAZEUR\", \"UCANN\", \"UCOC\", \"OPIT\", \"UPCP\"    No results found for: \"IRONSAT\", \"PF53501\", \"SANDRITA\"    No results found for: \"PH\", \"PHARTERIAL\", \"PO2\", \"TC7SAAKBDHC\", \"SAT\", \"PCO2\", \"HCO3\", \"BASEEXCESS\", \"LEODAN\", \"BEB\"    @LABRCNTIPR(phv:4,pco2v:4,po2v:4,hco3v:4,beatriz:4,o2per:4)@    Echocardiology: No results found for this or any previous visit (from the past 4320 hour(s)).    Chest x-ray: No results found for this or any previous visit from the past 365 days.      Chest CT: No results found for this or any previous visit from the past 365 days.      PFT: Most Recent [facility] Pulmonary Function Testing    Anabel Lacy, APRN CNP 3/11/2024   "

## 2024-03-15 ENCOUNTER — OFFICE VISIT (OUTPATIENT)
Dept: FAMILY MEDICINE | Facility: CLINIC | Age: 54
End: 2024-03-15
Payer: COMMERCIAL

## 2024-03-15 VITALS
BODY MASS INDEX: 38.36 KG/M2 | DIASTOLIC BLOOD PRESSURE: 66 MMHG | SYSTOLIC BLOOD PRESSURE: 118 MMHG | OXYGEN SATURATION: 95 % | WEIGHT: 315 LBS | HEART RATE: 62 BPM | HEIGHT: 76 IN | TEMPERATURE: 96.7 F

## 2024-03-15 DIAGNOSIS — Z23 NEED FOR VACCINATION: ICD-10-CM

## 2024-03-15 DIAGNOSIS — I25.10 CORONARY ARTERY DISEASE INVOLVING NATIVE CORONARY ARTERY OF NATIVE HEART WITHOUT ANGINA PECTORIS: ICD-10-CM

## 2024-03-15 DIAGNOSIS — K21.9 GASTROESOPHAGEAL REFLUX DISEASE WITHOUT ESOPHAGITIS: ICD-10-CM

## 2024-03-15 DIAGNOSIS — E29.1 HYPOGONADISM MALE: ICD-10-CM

## 2024-03-15 DIAGNOSIS — R73.9 ELEVATED BLOOD SUGAR: ICD-10-CM

## 2024-03-15 DIAGNOSIS — R00.2 PALPITATIONS: ICD-10-CM

## 2024-03-15 DIAGNOSIS — H81.13 BENIGN PAROXYSMAL POSITIONAL VERTIGO, BILATERAL: ICD-10-CM

## 2024-03-15 DIAGNOSIS — G89.29 CHRONIC LOW BACK PAIN, UNSPECIFIED BACK PAIN LATERALITY, UNSPECIFIED WHETHER SCIATICA PRESENT: Primary | ICD-10-CM

## 2024-03-15 DIAGNOSIS — M54.50 CHRONIC LOW BACK PAIN, UNSPECIFIED BACK PAIN LATERALITY, UNSPECIFIED WHETHER SCIATICA PRESENT: Primary | ICD-10-CM

## 2024-03-15 DIAGNOSIS — F41.1 GAD (GENERALIZED ANXIETY DISORDER): Chronic | ICD-10-CM

## 2024-03-15 DIAGNOSIS — E66.01 MORBID OBESITY (H): ICD-10-CM

## 2024-03-15 DIAGNOSIS — I10 BENIGN ESSENTIAL HYPERTENSION: Chronic | ICD-10-CM

## 2024-03-15 DIAGNOSIS — I47.20 V-TACH (H): ICD-10-CM

## 2024-03-15 DIAGNOSIS — E78.5 HYPERLIPIDEMIA LDL GOAL <130: ICD-10-CM

## 2024-03-15 PROBLEM — Z98.890 STATUS POST CORONARY ANGIOGRAM: Status: RESOLVED | Noted: 2023-06-15 | Resolved: 2024-03-15

## 2024-03-15 LAB
ALBUMIN SERPL BCG-MCNC: 4.2 G/DL (ref 3.5–5.2)
ALP SERPL-CCNC: 66 U/L (ref 40–150)
ALT SERPL W P-5'-P-CCNC: 44 U/L (ref 0–70)
ANION GAP SERPL CALCULATED.3IONS-SCNC: 12 MMOL/L (ref 7–15)
AST SERPL W P-5'-P-CCNC: 31 U/L (ref 0–45)
BILIRUB SERPL-MCNC: 0.7 MG/DL
BUN SERPL-MCNC: 16.9 MG/DL (ref 6–20)
CALCIUM SERPL-MCNC: 9.2 MG/DL (ref 8.6–10)
CHLORIDE SERPL-SCNC: 104 MMOL/L (ref 98–107)
CHOLEST SERPL-MCNC: 141 MG/DL
CREAT SERPL-MCNC: 1.01 MG/DL (ref 0.67–1.17)
DEPRECATED HCO3 PLAS-SCNC: 24 MMOL/L (ref 22–29)
EGFRCR SERPLBLD CKD-EPI 2021: 89 ML/MIN/1.73M2
ERYTHROCYTE [DISTWIDTH] IN BLOOD BY AUTOMATED COUNT: 13 % (ref 10–15)
FASTING STATUS PATIENT QL REPORTED: YES
GLUCOSE SERPL-MCNC: 98 MG/DL (ref 70–99)
HBA1C MFR BLD: 5.8 % (ref 0–5.6)
HCT VFR BLD AUTO: 47.9 % (ref 40–53)
HDLC SERPL-MCNC: 34 MG/DL
HGB BLD-MCNC: 15.6 G/DL (ref 13.3–17.7)
LDLC SERPL CALC-MCNC: 85 MG/DL
MCH RBC QN AUTO: 28.7 PG (ref 26.5–33)
MCHC RBC AUTO-ENTMCNC: 32.6 G/DL (ref 31.5–36.5)
MCV RBC AUTO: 88 FL (ref 78–100)
NONHDLC SERPL-MCNC: 107 MG/DL
PLATELET # BLD AUTO: 233 10E3/UL (ref 150–450)
POTASSIUM SERPL-SCNC: 4.1 MMOL/L (ref 3.4–5.3)
PROT SERPL-MCNC: 7.1 G/DL (ref 6.4–8.3)
PSA SERPL DL<=0.01 NG/ML-MCNC: 0.69 NG/ML (ref 0–3.5)
RBC # BLD AUTO: 5.43 10E6/UL (ref 4.4–5.9)
SODIUM SERPL-SCNC: 140 MMOL/L (ref 135–145)
TRIGL SERPL-MCNC: 112 MG/DL
WBC # BLD AUTO: 6.4 10E3/UL (ref 4–11)

## 2024-03-15 PROCEDURE — 90677 PCV20 VACCINE IM: CPT | Performed by: INTERNAL MEDICINE

## 2024-03-15 PROCEDURE — 83036 HEMOGLOBIN GLYCOSYLATED A1C: CPT | Performed by: INTERNAL MEDICINE

## 2024-03-15 PROCEDURE — 90472 IMMUNIZATION ADMIN EACH ADD: CPT | Performed by: INTERNAL MEDICINE

## 2024-03-15 PROCEDURE — 90471 IMMUNIZATION ADMIN: CPT | Performed by: INTERNAL MEDICINE

## 2024-03-15 PROCEDURE — 85027 COMPLETE CBC AUTOMATED: CPT | Performed by: INTERNAL MEDICINE

## 2024-03-15 PROCEDURE — 80061 LIPID PANEL: CPT | Performed by: INTERNAL MEDICINE

## 2024-03-15 PROCEDURE — 80053 COMPREHEN METABOLIC PANEL: CPT | Performed by: INTERNAL MEDICINE

## 2024-03-15 PROCEDURE — 36415 COLL VENOUS BLD VENIPUNCTURE: CPT | Performed by: INTERNAL MEDICINE

## 2024-03-15 PROCEDURE — 90715 TDAP VACCINE 7 YRS/> IM: CPT | Performed by: INTERNAL MEDICINE

## 2024-03-15 PROCEDURE — 99214 OFFICE O/P EST MOD 30 MIN: CPT | Mod: 25 | Performed by: INTERNAL MEDICINE

## 2024-03-15 PROCEDURE — 84403 ASSAY OF TOTAL TESTOSTERONE: CPT | Performed by: INTERNAL MEDICINE

## 2024-03-15 PROCEDURE — G0103 PSA SCREENING: HCPCS | Performed by: INTERNAL MEDICINE

## 2024-03-15 RX ORDER — PAROXETINE 10 MG/1
TABLET, FILM COATED ORAL
Qty: 90 TABLET | Refills: 3 | Status: SHIPPED | OUTPATIENT
Start: 2024-03-15

## 2024-03-15 RX ORDER — ASPIRIN 81 MG/1
81 TABLET ORAL DAILY
COMMUNITY
Start: 2024-03-15

## 2024-03-15 RX ORDER — METOPROLOL SUCCINATE 50 MG/1
50 TABLET, EXTENDED RELEASE ORAL DAILY
Qty: 90 TABLET | Refills: 3 | Status: SHIPPED | OUTPATIENT
Start: 2024-03-15

## 2024-03-15 RX ORDER — LOSARTAN POTASSIUM 25 MG/1
25 TABLET ORAL DAILY
Qty: 90 TABLET | Refills: 3 | Status: SHIPPED | OUTPATIENT
Start: 2024-03-15

## 2024-03-15 RX ORDER — ROSUVASTATIN CALCIUM 20 MG/1
20 TABLET, COATED ORAL DAILY
Qty: 90 TABLET | Refills: 3 | Status: SHIPPED | OUTPATIENT
Start: 2024-03-15

## 2024-03-15 RX ORDER — TESTOSTERONE CYPIONATE 200 MG/ML
100 INJECTION, SOLUTION INTRAMUSCULAR
Qty: 3 ML | Refills: 5 | Status: SHIPPED | OUTPATIENT
Start: 2024-03-15

## 2024-03-15 RX ORDER — HYDROXYZINE HYDROCHLORIDE 25 MG/1
TABLET, FILM COATED ORAL
Qty: 30 TABLET | Refills: 11 | Status: SHIPPED | OUTPATIENT
Start: 2024-03-15

## 2024-03-15 RX ORDER — PANTOPRAZOLE SODIUM 40 MG/1
40 TABLET, DELAYED RELEASE ORAL DAILY
Qty: 90 TABLET | Refills: 3 | Status: SHIPPED | OUTPATIENT
Start: 2024-03-15

## 2024-03-15 RX ORDER — PAROXETINE 40 MG/1
40 TABLET, FILM COATED ORAL AT BEDTIME
Qty: 90 TABLET | Refills: 3 | Status: SHIPPED | OUTPATIENT
Start: 2024-03-15

## 2024-03-15 ASSESSMENT — PAIN SCALES - GENERAL: PAINLEVEL: NO PAIN (0)

## 2024-03-15 NOTE — NURSING NOTE
Prior to immunization administration, verified patients identity using patient s name and date of birth. Please see Immunization Activity for additional information.     Screening Questionnaire for Adult Immunization    Are you sick today?   No   Do you have allergies to medications, food, a vaccine component or latex?   No   Have you ever had a serious reaction after receiving a vaccination?   No   Do you have a long-term health problem with heart, lung, kidney, or metabolic disease (e.g., diabetes), asthma, a blood disorder, no spleen, complement component deficiency, a cochlear implant, or a spinal fluid leak?  Are you on long-term aspirin therapy?   No   Do you have cancer, leukemia, HIV/AIDS, or any other immune system problem?   No   Do you have a parent, brother, or sister with an immune system problem?   No   In the past 3 months, have you taken medications that affect  your immune system, such as prednisone, other steroids, or anticancer drugs; drugs for the treatment of rheumatoid arthritis, Crohn s disease, or psoriasis; or have you had radiation treatments?   No   Have you had a seizure, or a brain or other nervous system problem?   No   During the past year, have you received a transfusion of blood or blood    products, or been given immune (gamma) globulin or antiviral drug?   No   For women: Are you pregnant or is there a chance you could become       pregnant during the next month?   No   Have you received any vaccinations in the past 4 weeks?   No     Immunization questionnaire answers were all negative.      Patient instructed to remain in clinic for 15 minutes afterwards, and to report any adverse reactions.     Screening performed by Maki Dawson on 3/15/2024 at 7:44 AM.

## 2024-03-15 NOTE — PROGRESS NOTES
Assessment & Plan     Chronic low back pain, unspecified back pain laterality, unspecified whether sciatica present  Continue med; if he wants to pursue more aggressive treatment strategy, he can let me know  - diclofenac (VOLTAREN) 50 MG EC tablet; Take 1 tablet (50 mg) by mouth 2 times daily as needed for moderate pain      ERIC (generalized anxiety disorder)   - stable, refill provided  - hydrOXYzine HCl (ATARAX) 25 MG tablet; TAKE ONE TO TWO TABLETS BY MOUTH UP TO THREE TIMES DAILY AS NEEDED AND TWO TO FOUR TABLETS BY MOUTH AT NIGHT FOR SLEEP AND ANXIETY  - PARoxetine (PAXIL) 10 MG tablet; TAKE ONE TABLET BY MOUTH EVERY MORNING TAKE IN ADDITION THE THE ONE 40MG TABLET FOR A TOTAL DOSE OF 50MG  - PARoxetine (PAXIL) 40 MG tablet; Take 1 tablet (40 mg) by mouth at bedtime    Gastroesophageal reflux disease without esophagitis   - stable, refill provided  - pantoprazole (PROTONIX) 40 MG EC tablet; Take 1 tablet (40 mg) by mouth daily    Hypogonadism male   - stable, refill provided  - Testosterone total; Future  - testosterone cypionate (DEPOTESTOSTERONE) 200 MG/ML injection; Inject 0.5 mLs (100 mg) into the muscle every 14 days  - PSA, screen; Future  - PSA, screen  - Testosterone total    Benign paroxysmal positional vertigo, bilateral  Start physical therapy   - Physical Therapy  Referral; Future    Benign essential hypertension   - stable, refill provided  - losartan (COZAAR) 25 MG tablet; Take 1 tablet (25 mg) by mouth daily  - CBC with platelets; Future  - Comprehensive metabolic panel (BMP + Alb, Alk Phos, ALT, AST, Total. Bili, TP); Future  - Comprehensive metabolic panel (BMP + Alb, Alk Phos, ALT, AST, Total. Bili, TP)  - CBC with platelets    Palpitations   - stable, refill provided  - metoprolol succinate ER (TOPROL XL) 50 MG 24 hr tablet; Take 1 tablet (50 mg) by mouth daily    V-tach (H)   - stable, refill provided  - metoprolol succinate ER (TOPROL XL) 50 MG 24 hr tablet; Take 1 tablet (50  mg) by mouth daily    Coronary artery disease involving native coronary artery of native heart without angina pectoris  Patient was not taking aspirin; he is willing to start    Hyperlipidemia LDL goal <130   - stable, refill provided  - rosuvastatin (CRESTOR) 20 MG tablet; Take 1 tablet (20 mg) by mouth daily  - Lipid panel reflex to direct LDL Fasting; Future  - Lipid panel reflex to direct LDL Fasting    Need for vaccination  - TDAP 10-64Y (ADACEL,BOOSTRIX)    Elevated blood sugar  - Hemoglobin A1c; Future  - Hemoglobin A1c    Morbid obesity (H)  Start med; discussed side effects, lifestyle changes  - Semaglutide-Weight Management (WEGOVY) 0.25 MG/0.5ML pen; Inject 0.25 mg Subcutaneous once a week for 28 days  - Semaglutide-Weight Management (WEGOVY) 0.5 MG/0.5ML pen; Inject 0.5 mg Subcutaneous once a week for 28 days              Patient Instructions   Dizziness  Referral to physical therapy     Back pain  Refill of Voltaren  We discussed updating imaging, referral to pain clinic or physical therapy but you declined for now; let me know if anything changes    Mental health  Hyperlipidemia  History of V-tach  Hypertension  Refills sent  Blood work today  Start aspirin 81 mg once daily; if you have worsening reflux, let me know    Health Care Maintenance  Pneumonia and tetanus vaccine today    Weight  Start Wegovy once weekly, then increase to next dose  Recommend follow-up in ~ 2 months, can be virtual, to see how medication is working      Tips for a Healthy Diet    Add more fresh fruits and vegetables to your diet.  The more colorful with the fruit or vegetable (think berries, spinach, carrots, peppers) the healthier it tends to be.  Juice is not a fruit.  Prepare the vegetables in a healthy way - steam, bake.  Avoid breading, butter/oil to cook.  Use herbs and spices instead of salt to season food.  Add more fiber to your diet.  Swap out white bread, white rice, white pasta for whole grain versions.  Reduce  the portion size and frequency of carbohydrates/starches.   Choose healthier fats such as nuts, olive oil, avocado, etc. Stay away from lard, butter.  Decrease the frequency and portion size of 'junk food' -pizza, candy, cookies, potato chips, etc.  Watch liquid calories such as coffee drinks, juice, soda, teas.  There tends to be excessive sugar in these beverages.  Increase protein in your diet.  Eggs, cheese, Greek yogurt, lentils, chicken, fish, seafood, are good healthy choices.  Protein keeps you gomes longer, and you are less likely to have blood sugar spikes  Eat healthy at least 80% of the time.  It is ok for a special treat every once in a while, just not every day.  When are you going to indulge (think [place] Fair time), be sure you are eating extra healthy the day before and after.      Resources:  [place] [employer] for Health and Wellness:[email or web address]/dig-yes-foods    2. Book - Atomic Habits by Ciaran Martino.  This a good book that looks into our habits and how to sustain good healthy habits and get rid of bad habits.    3. Food tracker -  My Fitness Pal, Lose It, Macros first, Spark People          Subjective   Amarjit is a 53 year old, presenting for the following health issues:  Recheck Medication (fasting)      3/15/2024     6:49 AM   Additional Questions   Roomed by Maki Menon   Accompanied by self         3/15/2024     6:49 AM   Patient Reported Additional Medications   Patient reports taking the following new medications none     History of Present Illness       Reason for visit:  Med refill    He eats 2-3 servings of fruits and vegetables daily.He consumes 1 sweetened beverage(s) daily.He exercises with enough effort to increase his heart rate 9 or less minutes per day.  He exercises with enough effort to increase his heart rate 3 or less days per week.   He is taking medications regularly.         Anxiety   How are you doing with your anxiety since your last visit?  Stable  Are you having other symptoms that might be associated with anxiety? No  Have you had a significant life event? No   Are you feeling depressed? No  Do you have any concerns with your use of alcohol or other drugs? No    Social History     Tobacco Use    Smoking status: Never    Smokeless tobacco: Never   Vaping Use    Vaping Use: Never used   Substance Use Topics    Alcohol use: Yes     Comment: Maybe once per year    Drug use: No         7/13/2020     8:25 AM 8/11/2020    12:32 PM 2/21/2023     8:42 AM   ERIC-7 SCORE   Total Score 1 (minimal anxiety)     Total Score 1 4 2         7/31/2018     7:35 AM 7/13/2020     8:26 AM 8/11/2020    12:32 PM   PHQ   PHQ-9 Total Score 3 2 3   Q9: Thoughts of better off dead/self-harm past 2 weeks Not at all Not at all Not at all         Pain History:  When did you first notice your pain? Ongoing for about 10 years   Have you seen this provider for your pain in the past? Yes   Where in your body do you have pain? Low back, bilateral  Are you seeing anyone else for your pain? No  --taking voltaren once daily;  it is helpful  --no significant worsening of back pain over the last 1 year  --last MRI was in 2016  --saw spine doctor years ago  --steroid injections initially helped but subsequent did not    Medication Followup of Testosterone  Taking Medication as prescribed: yes  Side Effects:  None  Medication Helping Symptoms:  yes    Dizziness  --feels more unstable  --history of vertigo  --episodes last minutes to hours; sometimes has to stay home   --feels like the room is spinning; can have nausea and vomiting with episodes.    Health Care Maintenance  --wonders which vax to get  He is not taking aspirin, although cards recommend it    Current Outpatient Medications   Medication Sig Dispense Refill    diclofenac (VOLTAREN) 50 MG EC tablet Take 1 tablet (50 mg) by mouth 2 times daily as needed for moderate pain (4-6) 180 tablet 3    hydrOXYzine (ATARAX) 25 MG tablet TAKE ONE  "TO TWO TABLETS BY MOUTH UP TO THREE TIMES DAILY AS NEEDED AND TWO TO FOUR TABLETS BY MOUTH AT NIGHT FOR SLEEP AND ANXIETY 30 tablet 11    losartan (COZAAR) 25 MG tablet Take 1 tablet (25 mg) by mouth daily 90 tablet 1    metoprolol succinate ER (TOPROL XL) 50 MG 24 hr tablet Take 1 tablet (50 mg) by mouth daily 90 tablet 3    ORDER FOR DME Auto-CPAP:  Max 15 cm H2O  Min 8 cm H2O    Continuous    Lifetime need and heated humidity.    1 each 0    pantoprazole (PROTONIX) 40 MG EC tablet Take 1 tablet (40 mg) by mouth daily 90 tablet 3    PARoxetine (PAXIL) 10 MG tablet TAKE ONE TABLET BY MOUTH EVERY MORNING TAKE IN ADDITION THE THE ONE 40MG TABLET FOR A TOTAL DOSE OF 50MG 90 tablet 3    PARoxetine (PAXIL) 40 MG tablet Take 1 tablet (40 mg) by mouth At Bedtime 90 tablet 3    rosuvastatin (CRESTOR) 20 MG tablet Take 1 tablet (20 mg) by mouth daily 90 tablet 1    testosterone cypionate (DEPOTESTOSTERONE) 200 MG/ML injection INJECT 0.5 MLS (100 MG) INTO THE MUSCLE EVERY 14 DAYS 3 mL 5       Review of Systems  Constitutional, HEENT, cardiovascular, pulmonary, gi and gu systems are negative, except as otherwise noted.      Objective    /66 (BP Location: Left arm, Patient Position: Sitting, Cuff Size: Adult Large)   Pulse 62   Temp (!) 96.7  F (35.9  C) (Tympanic)   Ht 1.94 m (6' 4.38\")   Wt (!) 156.9 kg (346 lb)   SpO2 95%   BMI 41.70 kg/m    Body mass index is 41.7 kg/m .  Physical Exam   GENERAL: alert and no distress  RESP: lungs clear to auscultation - no rales, rhonchi or wheezes  CV: regular rate and rhythm, normal S1 S2, no S3 or S4, no murmur, click or rub, no peripheral edema             Signed Electronically by: Ashley Crawley DO    "

## 2024-03-15 NOTE — LETTER
March 18, 2024      Amarjit Malik  [address] [address]        Dear ,    We are writing to inform you of your test results.    Your test results fall within the expected range(s) or remain unchanged from previous results.  Please continue with current treatment plan.    Patient has not viewed their results on [employer].  Please notify by calling or sending a letter.     Resulted Orders   Comprehensive metabolic panel (BMP + Alb, Alk Phos, ALT, AST, Total. Bili, TP)   Result Value Ref Range    Sodium 140 135 - 145 mmol/L      Comment:      Reference intervals for this test were updated on 09/26/2023 to more accurately reflect our healthy population. There may be differences in the flagging of prior results with similar values performed with this method. Interpretation of those prior results can be made in the context of the updated reference intervals.     Potassium 4.1 3.4 - 5.3 mmol/L    Carbon Dioxide (CO2) 24 22 - 29 mmol/L    Anion Gap 12 7 - 15 mmol/L    Urea Nitrogen 16.9 6.0 - 20.0 mg/dL    Creatinine 1.01 0.67 - 1.17 mg/dL    GFR Estimate 89 >60 mL/min/1.73m2    Calcium 9.2 8.6 - 10.0 mg/dL    Chloride 104 98 - 107 mmol/L    Glucose 98 70 - 99 mg/dL    Alkaline Phosphatase 66 40 - 150 U/L      Comment:      Reference intervals for this test were updated on 11/14/2023 to more accurately reflect our healthy population. There may be differences in the flagging of prior results with similar values performed with this method. Interpretation of those prior results can be made in the context of the updated reference intervals.    AST 31 0 - 45 U/L      Comment:      Reference intervals for this test were updated on 6/12/2023 to more accurately reflect our healthy population. There may be differences in the flagging of prior results with similar values performed with this method. Interpretation of those prior results can be made in the context of the updated reference intervals.    ALT  44 0 - 70 U/L      Comment:      Reference intervals for this test were updated on 6/12/2023 to more accurately reflect our healthy population. There may be differences in the flagging of prior results with similar values performed with this method. Interpretation of those prior results can be made in the context of the updated reference intervals.      Protein Total 7.1 6.4 - 8.3 g/dL    Albumin 4.2 3.5 - 5.2 g/dL    Bilirubin Total 0.7 <=1.2 mg/dL   CBC with platelets   Result Value Ref Range    WBC Count 6.4 4.0 - 11.0 10e3/uL    RBC Count 5.43 4.40 - 5.90 10e6/uL    Hemoglobin 15.6 13.3 - 17.7 g/dL    Hematocrit 47.9 40.0 - 53.0 %    MCV 88 78 - 100 fL    MCH 28.7 26.5 - 33.0 pg    MCHC 32.6 31.5 - 36.5 g/dL    RDW 13.0 10.0 - 15.0 %    Platelet Count 233 150 - 450 10e3/uL   Lipid panel reflex to direct LDL Fasting   Result Value Ref Range    Cholesterol 141 <200 mg/dL    Triglycerides 112 <150 mg/dL    Direct Measure HDL 34 (L) >=40 mg/dL    LDL Cholesterol Calculated 85 <=100 mg/dL    Non HDL Cholesterol 107 <130 mg/dL    Patient Fasting > 8hrs? Yes     Narrative    Cholesterol  Desirable:  <200 mg/dL    Triglycerides  Normal:  Less than 150 mg/dL  Borderline High:  150-199 mg/dL  High:  200-499 mg/dL  Very High:  Greater than or equal to 500 mg/dL    Direct Measure HDL  Female:  Greater than or equal to 50 mg/dL   Male:  Greater than or equal to 40 mg/dL    LDL Cholesterol  Desirable:  <100mg/dL  Above Desirable:  100-129 mg/dL   Borderline High:  130-159 mg/dL   High:  160-189 mg/dL   Very High:  >= 190 mg/dL    Non HDL Cholesterol  Desirable:  130 mg/dL  Above Desirable:  130-159 mg/dL  Borderline High:  160-189 mg/dL  High:  190-219 mg/dL  Very High:  Greater than or equal to 220 mg/dL       If you have any questions or concerns, please call the clinic at the number listed above.       Sincerely,      Ashley Crawley, DO

## 2024-03-15 NOTE — RESULT ENCOUNTER NOTE
Cholesterol is stable and well controlled. Blood sugar is mildly elevated in the pre-diabetic range. PSA, liver, Kidney function, blood sugar, electrolytes, blood counts are normal.

## 2024-03-15 NOTE — PATIENT INSTRUCTIONS
Dizziness  Referral to physical therapy     Back pain  Refill of Voltaren  We discussed updating imaging, referral to pain clinic or physical therapy but you declined for now; let me know if anything changes    Mental health  Hyperlipidemia  History of V-tach  Hypertension  Refills sent  Blood work today  Start aspirin 81 mg once daily; if you have worsening reflux, let me know    Health Care Maintenance  Pneumonia and tetanus vaccine today    Weight  Start Wegovy once weekly, then increase to next dose  Recommend follow-up in ~ 2 months, can be virtual, to see how medication is working      Tips for a Healthy Diet    Add more fresh fruits and vegetables to your diet.  The more colorful with the fruit or vegetable (think berries, spinach, carrots, peppers) the healthier it tends to be.  Juice is not a fruit.  Prepare the vegetables in a healthy way - steam, bake.  Avoid breading, butter/oil to cook.  Use herbs and spices instead of salt to season food.  Add more fiber to your diet.  Swap out white bread, white rice, white pasta for whole grain versions.  Reduce the portion size and frequency of carbohydrates/starches.   Choose healthier fats such as nuts, olive oil, avocado, etc. Stay away from lard, butter.  Decrease the frequency and portion size of 'junk food' -pizza, candy, cookies, potato chips, etc.  Watch liquid calories such as coffee drinks, juice, soda, teas.  There tends to be excessive sugar in these beverages.  Increase protein in your diet.  Eggs, cheese, Greek yogurt, lentils, chicken, fish, seafood, are good healthy choices.  Protein keeps you gomes longer, and you are less likely to have blood sugar spikes  Eat healthy at least 80% of the time.  It is ok for a special treat every once in a while, just not every day.  When are you going to indulge (think [place] Fair time), be sure you are eating extra healthy the day before and after.      Resources:  [place] [employer] for Health and  Wellness:[email or web address]/dig-yes-foods    2. Book - Atomic Habits by Ciaran Martino.  This a good book that looks into our habits and how to sustain good healthy habits and get rid of bad habits.    3. Food tracker -  My Fitness Pal, Lose It, Macros first, Spark People

## 2024-03-18 ENCOUNTER — MYC MEDICAL ADVICE (OUTPATIENT)
Dept: FAMILY MEDICINE | Facility: CLINIC | Age: 54
End: 2024-03-18
Payer: COMMERCIAL

## 2024-03-19 LAB — TESTOST SERPL-MCNC: 377 NG/DL (ref 240–950)

## 2024-03-27 ENCOUNTER — THERAPY VISIT (OUTPATIENT)
Dept: PHYSICAL THERAPY | Facility: CLINIC | Age: 54
End: 2024-03-27
Attending: INTERNAL MEDICINE
Payer: COMMERCIAL

## 2024-03-27 DIAGNOSIS — H81.13 BENIGN PAROXYSMAL POSITIONAL VERTIGO, BILATERAL: ICD-10-CM

## 2024-03-27 DIAGNOSIS — H81.10 BENIGN PAROXYSMAL POSITIONAL VERTIGO, UNSPECIFIED LATERALITY: Primary | ICD-10-CM

## 2024-03-27 PROCEDURE — 97162 PT EVAL MOD COMPLEX 30 MIN: CPT | Mod: GP | Performed by: PHYSICAL THERAPIST

## 2024-03-27 PROCEDURE — 97112 NEUROMUSCULAR REEDUCATION: CPT | Mod: GP | Performed by: PHYSICAL THERAPIST

## 2024-03-27 NOTE — PROGRESS NOTES
PHYSICAL THERAPY EVALUATION  Type of Visit: Evaluation    See electronic medical record for Abuse and Falls Screening details.    Subjective Episodes of lightheaded, nausea, few times bad room spinning for hours, throwing up, gone to ED. Has had several bouts over the years. Seems to be seasonal, weather changes. Last week had another episode, mild one, lightheaded, off balance. Sx turnning head too quick, turn corners, playing video games. Notes he cannot ride in car as passenger, motion sensitive as he has become older. Today he feels pretty much fine. Notes at the end of the session, he was once told he had Meniere's, has loss of hearing bilateral, does not think one worse than other      Presenting condition or subjective complaint:    Date of onset: 03/20/24    Relevant medical history:     Dates & types of surgery:      Prior diagnostic imaging/testing results:       Prior therapy history for the same diagnosis, illness or injury:        Prior Level of Function  Transfers: Independent  Ambulation: Independent  ADL: Independent  IADL:       Employment:    RN OR manager, mostly computer work now  Hobbies/Interests:      Patient goals for therapy:  get rid of dizziness    Pain assessment:      Objective        GAIT: gait is normal in all conditions      BALANCE:     SPECIAL TESTS    Modified CTSIB Conditions (sec) Cond 1: 30  Cond 2: 30  Cond 4: 30  Cond 5 : 13   Romberg  (sec)    Sharpened Romberg (sec)    30 Second Sit to Stand (reps/height)    Mini-BESTest                   VESTIBULAR EVALUATION         Pertinent visual history:   Pertinent history of current vestibular problem: Motion sickness  DHI:      Cervicogenic Screen    Neck ROM    Vertebral Artery Test Normal   Alar Ligament Test    Transverse Ligament Test Normal   Distraction    Neck Torsion Test (head still, body rotating)    Neck Torsion Test (head and body rotating)         Oculomotor Screen    Ocular ROM    Smooth Pursuit Normal   Saccades  Normal   VOR Normal seated , passive head turn   VOR Cancellation    Head Impulse Test    Convergence Testing         Infrared Goggle Exam Vestibular Suppressant in Last 24 Hours? No  Exam Completed With: Room light   Spontaneous Nystagmus Negative   Gaze Evoked Nystagmus    Head Shake Horizontal Nystagmus Negative   Positional Testing    Supine Head-Hanging Test     Left Right   Ki-Hallpike Negative Negative   Sidelying Test     HSCC Supine Roll Test Negative Negative   HSCC Forward Roll Test     Tilden and Lean Test -  Sitting Erect    Tilden and Lean Test - Seated, Head Bent 60 Degrees Forward    Tilden and Lean Test - Seated, Head Bent Backwards       BPPV Canal(s):   BPPV Type:     Dynamic Visual Acuity (DVA)    Static Acuity (LogMar) Line 10   Horizontal Head Movement at 1 Hz (LogMar) Line 7 blurry, approx 15 sec after test ended became very nauseous, thought he was going to throw up, hot, sweaty, had to sit and rest, did not complete further balance tests as planned   Horizontal Head Movement at 2 Hz (LogMar)           Assessment & Plan   CLINICAL IMPRESSIONS  Medical Diagnosis: BPPV    Treatment Diagnosis: vestibular hypofunction   Impression/Assessment: Patient is a 53 year old male with dizziness complaints.  The following significant findings have been identified: Impaired balance, Dizziness, and Disequilibrium . These impairments interfere with their ability to perform work tasks, recreational activities, household chores, and community mobility as compared to previous level of function.     Clinical Decision Making (Complexity):  Clinical Presentation: Evolving/Changing  Clinical Presentation Rationale: based on medical and personal factors listed in PT evaluation  Clinical Decision Making (Complexity): Moderate complexity    PLAN OF CARE  Treatment Interventions:  Interventions: Neuromuscular Re-education, Therapeutic Exercise, Self-Care/Home Management    Long Term Goals     PT Goal 1  Goal Identifier:  1  Goal Description: pt will be able to turn head, turn corners w/o dizziness in2wks  Target Date: 04/10/24  PT Goal 2  Goal Identifier: 2  Goal Description: pt will be able to do all activity w/o dizziness in 4wks  Target Date: 04/24/24      Frequency of Treatment: 1x/wk to valery other week  Duration of Treatment: 6wk    Recommended Referrals to Other Professionals:   Education Assessment:   Learner/Method: Patient;Listening;No Barriers to Learning    Risks and benefits of evaluation/treatment have been explained.   Patient/Family/caregiver agrees with Plan of Care.     Evaluation Time:     PT Eval, Moderate Complexity Minutes (31045): 25       Signing Clinician: Kris Hoenk, PT

## 2024-04-05 ENCOUNTER — MYC MEDICAL ADVICE (OUTPATIENT)
Dept: FAMILY MEDICINE | Facility: CLINIC | Age: 54
End: 2024-04-05
Payer: COMMERCIAL

## 2024-04-08 ENCOUNTER — TELEPHONE (OUTPATIENT)
Dept: FAMILY MEDICINE | Facility: CLINIC | Age: 54
End: 2024-04-08
Payer: COMMERCIAL

## 2024-04-08 DIAGNOSIS — E66.01 MORBID OBESITY (H): Primary | ICD-10-CM

## 2024-04-08 NOTE — TELEPHONE ENCOUNTER
MyChart response sent, and PA initiated in TE and routed to PA pool.    Clementina Rose RN  [facility]

## 2024-04-08 NOTE — TELEPHONE ENCOUNTER
Prior Authorization Retail Medication Request    Medication/Dose:     Semaglutide-Weight Management (WEGOVY) 0.25 MG/0.5ML pen Inject 0.25 mg Subcutaneous once a week for 28 days 2 mL 0 ordered 03/15/2024 04/12/2024       Semaglutide-Weight Management (WEGOVY) 0.5 MG/0.5ML pen Inject 0.5 mg Subcutaneous once a week for 28 days 2 mL 0 ordered 03/15/2024 04/12/2024     Diagnosis and ICD code (if different than what is on RX):  See Rx  New/renewal/insurance change PA/secondary ins. PA:  Previously Tried and Failed:  Unknown  Rationale:  Unknown    Insurance   Primary: See chart.   Insurance ID:  See chart.     Secondary (if applicable): See chart.   Insurance ID:  See chart.     Pharmacy Information (if different than what is on RX)  See Rx.    Clementina Rose RN  [facility]

## 2024-04-19 NOTE — TELEPHONE ENCOUNTER
PA Initiation    Medication: WEGOVY 0.25 MG/0.5ML SC SOAJ  Insurance Company: [employer] - Phone [phone] Fax [phone]  Pharmacy Filling the Rx: [facility] [place] - [address]  Filling Pharmacy Phone: [phone]  Filling Pharmacy Fax:    Start Date: 4/19/2024  Retail Pharmacy Prior Authorization Team   Phone: [phone]

## 2024-04-23 NOTE — TELEPHONE ENCOUNTER
My mistake, I should have been more clear.  Insurance requires demonstration of fatty liver disease and the MTM visit to pay for this class of drugs.  His liver enzymes are normal.  He does not have previous imaging of the liver, hence the reason for the ultrasound

## 2024-04-23 NOTE — TELEPHONE ENCOUNTER
Patient will need a right upper quadrant ultrasound to evaluate for fatty liver.  If he does have fatty liver, then he will need an MTM pharmacy visit in order for the medication to be approved.  Referrals placed for both

## 2024-04-23 NOTE — TELEPHONE ENCOUNTER
PRIOR AUTHORIZATION DENIED    Medication: WEGOVY 0.25 MG/0.5ML SC SOAJ  Insurance Company: [employer] - Phone [phone] Fax [phone]  Denial Date: 4/22/2024  Denial Reason(s):       Appeal Information:     Patient Notified: The provider should notify the patient of the denial.

## 2024-04-23 NOTE — TELEPHONE ENCOUNTER
Pt was notified that PA was denied, and of PCP's message below. He asks for further clarification re: need for US; is this for the purpose of trying to get this medication covered, or is there concern for potential liver issues? Says he's agreeable to this procedure, just wants to make sure he's understanding. Routing to PCP for review.    Sindi Mari RN  [facility]

## 2024-04-23 NOTE — TELEPHONE ENCOUNTER
Pt was notified, and verbalized understanding. Phone numbers provided for scheduling (for both imaging and MTM).    Sindi Mari RN  [facility]

## 2024-05-07 ENCOUNTER — HOSPITAL ENCOUNTER (OUTPATIENT)
Dept: ULTRASOUND IMAGING | Facility: CLINIC | Age: 54
Discharge: HOME | End: 2024-05-07
Attending: INTERNAL MEDICINE | Admitting: INTERNAL MEDICINE
Payer: COMMERCIAL

## 2024-05-07 DIAGNOSIS — E66.01 MORBID OBESITY (H): Primary | ICD-10-CM

## 2024-05-07 DIAGNOSIS — E66.01 MORBID OBESITY (H): ICD-10-CM

## 2024-05-07 PROCEDURE — 76705 ECHO EXAM OF ABDOMEN: CPT

## 2024-05-07 RX ORDER — PHENTERMINE HYDROCHLORIDE 15 MG/1
15 CAPSULE ORAL EVERY MORNING
Qty: 30 CAPSULE | Refills: 0 | Status: SHIPPED | OUTPATIENT
Start: 2024-05-07 | End: 2024-06-06

## 2024-05-07 RX ORDER — PHENTERMINE HYDROCHLORIDE 30 MG/1
30 CAPSULE ORAL EVERY MORNING
Qty: 30 CAPSULE | Refills: 2 | Status: SHIPPED | OUTPATIENT
Start: 2024-05-07

## 2024-05-07 NOTE — RESULT ENCOUNTER NOTE
No, the phentermine will not lower blood sugar directly like the wegovy would.  I usually start with phentermine; sometimes metformin can be helpful for pre-diabetes and weight loss, but I used that as 2nd line medication.

## 2024-05-07 NOTE — RESULT ENCOUNTER NOTE
The Wegovy will not be covered under insurance unfortunately since there are not signs of fatty liver disease which is a requirement for [employer] insurance payment of this medication.    Recommend to start phentermine 15 mg once daily for 30 days, then 30 mg once daily.  It can sometimes cause insomnia or jitteriness, take it in the morning.  Occasionally can elevate blood pressure.  Recommend to check your blood pressure a few times after starting the medication, and again once you increase the dose.  We should follow-up on the medication in 2-3 months, virtual or face-to-face is fine

## 2024-05-10 NOTE — PROGRESS NOTES
Discharge Note -Physical Therapy    NAME:  Amarjit Malik  MRN:   [id number]       Pt did not follow up for therapy as recommended. No further contacts have been made. Last objective information or progress note will serve as final entry. Discharge from PT this date.      [facility]  Kris Hoenk  PT  [facility]  [address] [place] [address]  [email or web address]   Office: [phone]  Voicemail: [phone]

## 2024-05-13 ENCOUNTER — HOSPITAL ENCOUNTER (EMERGENCY)
Facility: CLINIC | Age: 54
Discharge: HOME | End: 2024-05-13
Attending: PHYSICIAN ASSISTANT | Admitting: PHYSICIAN ASSISTANT
Payer: COMMERCIAL

## 2024-05-13 VITALS
TEMPERATURE: 97 F | SYSTOLIC BLOOD PRESSURE: 128 MMHG | DIASTOLIC BLOOD PRESSURE: 90 MMHG | RESPIRATION RATE: 16 BRPM | OXYGEN SATURATION: 99 % | HEART RATE: 75 BPM

## 2024-05-13 DIAGNOSIS — M54.2 CERVICALGIA: ICD-10-CM
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
## 2024-05-13 PROCEDURE — G0463 HOSPITAL OUTPT CLINIC VISIT: HCPCS

## 2024-05-13 PROCEDURE — 99213 OFFICE O/P EST LOW 20 MIN: CPT | Performed by: PHYSICIAN ASSISTANT

## 2024-05-13 ASSESSMENT — COLUMBIA-SUICIDE SEVERITY RATING SCALE - C-SSRS
2. HAVE YOU ACTUALLY HAD ANY THOUGHTS OF KILLING YOURSELF IN THE PAST MONTH?: NO
6. HAVE YOU EVER DONE ANYTHING, STARTED TO DO ANYTHING, OR PREPARED TO DO ANYTHING TO END YOUR LIFE?: NO
1. IN THE PAST MONTH, HAVE YOU WISHED YOU WERE DEAD OR WISHED YOU COULD GO TO SLEEP AND NOT WAKE UP?: NO

## 2024-05-13 ASSESSMENT — ACTIVITIES OF DAILY LIVING (ADL): ADLS_ACUITY_SCORE: 35

## 2024-05-13 NOTE — Clinical Note
Amarjit Helena was seen and treated in our emergency department on 5/13/2024.    I recommend he rest for the next 48 hours with minimal activity only as tolerated by headache and neck pain.       Sincerely,     [facility] Emergency Dept

## 2024-05-13 NOTE — Clinical Note
Amarjit Helena was seen and treated in our emergency department on 5/13/2024.    I recommend he rest for the next 48 hours with minimal activity only as tolerated by headache and neck pain.       Sincerely,     [facility] Emergency Dept
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
## 2024-06-06 ENCOUNTER — TELEPHONE (OUTPATIENT)
Dept: FAMILY MEDICINE | Facility: CLINIC | Age: 54
End: 2024-06-06
Payer: COMMERCIAL

## 2024-06-06 NOTE — TELEPHONE ENCOUNTER
Central Prior Authorization Team   Phone: [phone]    PA Initiation    Medication:   phentermine 15 MG capsule     Insurance Company: [employer] - Phone [phone] Fax [phone]  Pharmacy Filling the Rx: [facility] - [id number]  Filling Pharmacy Phone: [phone]  Filling Pharmacy Fax:    Start Date: 6/6/2024

## 2024-06-06 NOTE — TELEPHONE ENCOUNTER
Prior Authorization Retail Medication Request    Medication/Dose:   phentermine 15 MG capsule     Diagnosis and ICD code (if different than what is on RX):    New/renewal/insurance change PA/secondary ins. PA:  Previously Tried and Failed:    Rationale:               1 Principal Discharge DX:	Pancreatitis

## 2024-06-06 NOTE — TELEPHONE ENCOUNTER
Prior Authorization Approval    Authorization Effective Date: 6/6/2024  Authorization Expiration Date: 9/4/2024  Medication: phentermine 15 MG capsule -PA APPROVED   Approved Dose/Quantity:   Reference #:     Insurance Company: [employer] - [employer] [phone] Fax [phone]  Expected CoPay:       CoPay Card Available:      Foundation Assistance Needed:    Which Pharmacy is filling the prescription (Not needed for infusion/clinic administered): [facility] [place], [place] - [id number]  Pharmacy Notified:  Yes  Patient Notified:  No

## 2024-06-10 ENCOUNTER — HOSPITAL ENCOUNTER (EMERGENCY)
Facility: CLINIC | Age: 54
Discharge: HOME | End: 2024-06-10
Attending: INTERNAL MEDICINE | Admitting: INTERNAL MEDICINE
Payer: COMMERCIAL

## 2024-06-10 ENCOUNTER — APPOINTMENT (OUTPATIENT)
Dept: GENERAL RADIOLOGY | Facility: CLINIC | Age: 54
End: 2024-06-10
Attending: INTERNAL MEDICINE
Payer: COMMERCIAL

## 2024-06-10 ENCOUNTER — TELEPHONE (OUTPATIENT)
Dept: FAMILY MEDICINE | Facility: CLINIC | Age: 54
End: 2024-06-10
Payer: COMMERCIAL

## 2024-06-10 VITALS
DIASTOLIC BLOOD PRESSURE: 89 MMHG | TEMPERATURE: 98.2 F | HEART RATE: 59 BPM | OXYGEN SATURATION: 94 % | SYSTOLIC BLOOD PRESSURE: 133 MMHG | RESPIRATION RATE: 16 BRPM

## 2024-06-10 DIAGNOSIS — S50.01XA CONTUSION OF RIGHT ELBOW, INITIAL ENCOUNTER: ICD-10-CM

## 2024-06-10 PROCEDURE — 99283 EMERGENCY DEPT VISIT LOW MDM: CPT | Performed by: INTERNAL MEDICINE

## 2024-06-10 PROCEDURE — 73080 X-RAY EXAM OF ELBOW: CPT | Mod: RT

## 2024-06-10 ASSESSMENT — COLUMBIA-SUICIDE SEVERITY RATING SCALE - C-SSRS
6. HAVE YOU EVER DONE ANYTHING, STARTED TO DO ANYTHING, OR PREPARED TO DO ANYTHING TO END YOUR LIFE?: NO
2. HAVE YOU ACTUALLY HAD ANY THOUGHTS OF KILLING YOURSELF IN THE PAST MONTH?: NO
1. IN THE PAST MONTH, HAVE YOU WISHED YOU WERE DEAD OR WISHED YOU COULD GO TO SLEEP AND NOT WAKE UP?: NO

## 2024-06-10 ASSESSMENT — ACTIVITIES OF DAILY LIVING (ADL): ADLS_ACUITY_SCORE: 35

## 2024-06-10 NOTE — ED PROVIDER NOTES
ED Provider Note  [facility]      History     Chief Complaint   Patient presents with    Elbow Injury     Right elbow swollen and painful. Patient fell on it this weekend.     YUMIKO Malik is a 53 year old male who presents to the emergency department due to right elbow swelling and pain following a fall this weekend.    Patient injured right elbow after a fall while playing pickle ball on Sunday, 6/9/2024.  Patient denies numbness, tingling, or any weakness.  No other injuries.    Past Medical History  Past Medical History:   Diagnosis Date    Anxiety     Gastro-oesophageal reflux disease     Obesity (BMI 30-39.9)     Obstructive sleep apnea (adult) (pediatric)     panic attacks, and anxiety  1/5/2006    stable on paxil    PONV (postoperative nausea and vomiting)     Pure hypercholesterolemia     Unspecified essential hypertension      Past Surgical History:   Procedure Laterality Date    COLONOSCOPY N/A 12/29/2020    Procedure: COLONOSCOPY;  Surgeon: Frank Echeverria MD;  Location: [facility]    CV CORONARY ANGIOGRAM N/A 06/15/2023    Procedure: Coronary Angiogram;  Surgeon: Alex Bates MD;  Location: [facility] CARDIAC CATH LAB    CV LEFT VENTRICULOGRAM N/A 06/15/2023    Procedure: Left Ventriculogram;  Surgeon: Alex Bates MD;  Location: [facility] CARDIAC CATH LAB    ESOPHAGOSCOPY, GASTROSCOPY, DUODENOSCOPY (EGD), COMBINED  12/17/2012    Procedure: COMBINED ESOPHAGOSCOPY, GASTROSCOPY, DUODENOSCOPY (EGD);  Gastroscopy  ;  Surgeon: Luc Smith MD;  Location: [facility]    ESOPHAGOSCOPY, GASTROSCOPY, DUODENOSCOPY (EGD), COMBINED N/A 12/29/2020    Procedure: ESOPHAGOGASTRODUODENOSCOPY, WITH BIOPSY;  Surgeon: Frank Echeverria MD;  Location: [facility]    HERNIA REPAIR      PROCURE BONE MARROW  01/03/2014    Procedure: PROCURE BONE MARROW;  Bone Marrow [place] Donor;  Surgeon: Chaz Negro MD;  [facility]    SURGICAL HISTORY OF -       excision of bone tumor     SURGICAL HISTORY OF -   2000    vasectomy     aspirin (ASPIRIN LOW DOSE) 81 MG EC tablet  diclofenac (VOLTAREN) 50 MG EC tablet  hydrOXYzine HCl (ATARAX) 25 MG tablet  losartan (COZAAR) 25 MG tablet  metoprolol succinate ER (TOPROL XL) 50 MG 24 hr tablet  ORDER FOR DME  pantoprazole (PROTONIX) 40 MG EC tablet  PARoxetine (PAXIL) 10 MG tablet  PARoxetine (PAXIL) 40 MG tablet  phentermine 30 MG capsule  rosuvastatin (CRESTOR) 20 MG tablet  testosterone cypionate (DEPOTESTOSTERONE) 200 MG/ML injection      Allergies   Allergen Reactions    Zantac      flushing     Family History  Family History   Problem Relation Age of Onset    Diabetes Mother         type 2    Hypertension Father     Sleep Apnea Father     Sleep Apnea Paternal Grandmother     Prostate Cancer Paternal Grandfather     Kidney Cancer Paternal Grandfather     Diabetes Brother     Sleep Apnea Brother     Sleep Apnea Brother     Heart Disease Paternal Uncle         MI at 49 yo    C.A.D. Other         paternal uncle  at 45 of MI    Cancer - colorectal No family hx of      Social History   Social History     Tobacco Use    Smoking status: Never    Smokeless tobacco: Never   Vaping Use    Vaping status: Never Used   Substance Use Topics    Alcohol use: Yes     Comment: Maybe once per year    Drug use: No      A medically appropriate review of systems was performed with pertinent positives and negatives noted in the HPI, and all other systems negative.    Physical Exam   BP: (!) 135/92  Pulse: 66  Temp: 98.2  F (36.8  C)  Resp: 16  SpO2: 95 %  Physical Exam  Constitutional:       General: He is not in acute distress.     Appearance: He is not diaphoretic.   HENT:      Head: Atraumatic.   Eyes:      General: No scleral icterus.     Pupils: Pupils are equal, round, and reactive to light.   Cardiovascular:      Rate and Rhythm: Regular rhythm.      Heart sounds: Normal heart sounds.   Pulmonary:      Effort: No respiratory distress.      Breath  sounds: Normal breath sounds.   Chest:      Chest wall: No tenderness.   Abdominal:      General: Bowel sounds are normal.      Palpations: Abdomen is soft.      Tenderness: There is no abdominal tenderness.   Musculoskeletal:      Right elbow: Swelling present. No deformity, effusion or lacerations. Decreased range of motion. Tenderness present in lateral epicondyle. No radial head, medial epicondyle or olecranon process tenderness.      Left elbow: Normal.        Arms:       Cervical back: No tenderness.      Thoracic back: No tenderness.      Lumbar back: No tenderness.   Skin:     General: Skin is warm.      Findings: No rash.   Neurological:      Mental Status: He is oriented to person, place, and time.           ED Course, Procedures, & Data      Procedures            Results for orders placed or performed during the hospital encounter of 06/10/24   Elbow XR, G/E 3 views, right     Status: None    Narrative    XR ELBOW RIGHT G/E 3 VIEWS   6/10/2024 12:07 PM     HISTORY: fall pain  COMPARISON: None.       Impression    IMPRESSION: Views of the right elbow are negative for acute fracture  or dislocation. Normal joint spacing. No sizable joint effusion.    BRUCE ARELLANO MD         SYSTEM ID:  [id number]     Medications - No data to display  Labs Ordered and Resulted from Time of ED Arrival to Time of ED Departure - No data to display  Elbow XR, G/E 3 views, right   Final Result   IMPRESSION: Views of the right elbow are negative for acute fracture   or dislocation. Normal joint spacing. No sizable joint effusion.      BRUCE ARELLANO MD            SYSTEM ID:  [id number]             Critical care was not performed.     Medical Decision Making  The patient's presentation was of moderate complexity (an acute complicated injury).    The patient's evaluation involved:  ordering and/or review of 1 test(s) in this encounter (see separate area of note for details)    The patient's management necessitated only low risk  treatment.    Assessment & Plan    Acute fall during pickle ball game with right elbow contusion or lateral epicondyle, XR neg for fx or dislocation, discharge with ace wrap and sling, follow up with his PMD prn.    I have reviewed the nursing notes. I have reviewed the findings, diagnosis, plan and need for follow up with the patient.    Discharge Medication List as of 6/10/2024 12:48 PM          Final diagnoses:   Contusion of right elbow, initial encounter       Rancho Vazquez Md  [facility] EMERGENCY DEPARTMENT  6/10/2024     Rancho Vazquez MD  06/10/24 5761

## 2024-06-10 NOTE — ED TRIAGE NOTES
Triage Assessment (Adult)       Row Name 06/10/24 1108          Triage Assessment    Airway WDL WDL        Respiratory WDL    Respiratory WDL WDL        Skin Circulation/Temperature WDL    Skin Circulation/Temperature WDL WDL        Cardiac WDL    Cardiac WDL WDL        Peripheral/Neurovascular WDL    Peripheral Neurovascular WDL WDL        Cognitive/Neuro/Behavioral WDL    Cognitive/Neuro/Behavioral WDL WDL

## 2024-06-10 NOTE — TELEPHONE ENCOUNTER
MTM appointment canceled, we made one more attempt to reschedule. Pt is not interested at this time.     Routing back to referring provider and MTM Pharmacist Team      [place]

## 2024-06-16 ENCOUNTER — HEALTH MAINTENANCE LETTER (OUTPATIENT)
Age: 54
End: 2024-06-16

## 2024-07-09 NOTE — TELEPHONE ENCOUNTER
DIAGNOSIS: Contusion of right elbow, initial encounter.   APPOINTMENT DATE: 07/10/2024   NOTES STATUS DETAILS   DISCHARGE REPORT from the ER Internal 06/10/2024 - [facility] ED   XRAYS (IMAGES & REPORTS) Internal 06/10/2024 - RT Elbow

## 2024-07-10 ENCOUNTER — PRE VISIT (OUTPATIENT)
Dept: ORTHOPEDICS | Facility: CLINIC | Age: 54
End: 2024-07-10

## 2024-07-10 ENCOUNTER — OFFICE VISIT (OUTPATIENT)
Dept: ORTHOPEDICS | Facility: CLINIC | Age: 54
End: 2024-07-10
Payer: COMMERCIAL

## 2024-07-10 DIAGNOSIS — M25.521 ELBOW PAIN, RIGHT: Primary | ICD-10-CM

## 2024-07-10 PROCEDURE — 99203 OFFICE O/P NEW LOW 30 MIN: CPT | Mod: GC | Performed by: ORTHOPAEDIC SURGERY

## 2024-07-10 NOTE — NURSING NOTE
Reason For Visit:   Chief Complaint   Patient presents with    Consult     Right elbow swelling and pain due to a fall on 6/9/24       Primary MD: Ashley Crawley  Ref. MD: Self    Age: 53 year old    ?  No      There were no vitals taken for this visit.      Pain Assessment  Patient Currently in Pain: Yes  0-10 Pain Scale: 3  Primary Pain Location: Elbow (Right)  Pain Descriptors: Intermittent, Aching, Itching, Sharp    Hand Dominance Evaluation  Hand Dominance: Right          QuickDASH Assessment      7/9/2024     9:17 AM   QuickDASH Main   1. Open a tight or new jar No difficulty   2. Do heavy household chores (e.g., wash walls, floors) No difficulty   3. Carry a shopping bag or briefcase No difficulty   4. Wash your back Mild difficulty   5. Use a knife to cut food No difficulty   6. Recreational activities in which you take some force or impact through your arm, shoulder or hand (e.g., golf, hammering, tennis, etc.) Moderate difficulty   7. During the past week, to what extent has your arm, shoulder or hand problem interfered with your normal social activities with family, friends, neighbours or groups Moderately   8. During the past week, were you limited in your work or other regular daily activities as a result of your arm, shoulder or hand problem Slightly limited   9. Arm, shoulder or hand pain Moderate   10.Tingling (pins and needles) in your arm,shoulder or hand Moderate   11. During the past week, how much difficulty have you had sleeping because of the pain in your arm, shoulder or hand Mild difficulty   Quickdash Ability Score 25          Current Outpatient Medications   Medication Sig Dispense Refill    aspirin (ASPIRIN LOW DOSE) 81 MG EC tablet Take 1 tablet (81 mg) by mouth daily      diclofenac (VOLTAREN) 50 MG EC tablet Take 1 tablet (50 mg) by mouth 2 times daily as needed for moderate pain 180 tablet 3    hydrOXYzine HCl (ATARAX) 25 MG tablet TAKE ONE TO TWO TABLETS BY MOUTH UP  TO THREE TIMES DAILY AS NEEDED AND TWO TO FOUR TABLETS BY MOUTH AT NIGHT FOR SLEEP AND ANXIETY 30 tablet 11    losartan (COZAAR) 25 MG tablet Take 1 tablet (25 mg) by mouth daily 90 tablet 3    metoprolol succinate ER (TOPROL XL) 50 MG 24 hr tablet Take 1 tablet (50 mg) by mouth daily 90 tablet 3    ORDER FOR DME Auto-CPAP:  Max 15 cm H2O  Min 8 cm H2O    Continuous    Lifetime need and heated humidity.    1 each 0    pantoprazole (PROTONIX) 40 MG EC tablet Take 1 tablet (40 mg) by mouth daily 90 tablet 3    PARoxetine (PAXIL) 10 MG tablet TAKE ONE TABLET BY MOUTH EVERY MORNING TAKE IN ADDITION THE THE ONE 40MG TABLET FOR A TOTAL DOSE OF 50MG 90 tablet 3    PARoxetine (PAXIL) 40 MG tablet Take 1 tablet (40 mg) by mouth at bedtime 90 tablet 3    phentermine 30 MG capsule Take 1 capsule (30 mg) by mouth every morning 30 capsule 2    rosuvastatin (CRESTOR) 20 MG tablet Take 1 tablet (20 mg) by mouth daily 90 tablet 3    testosterone cypionate (DEPOTESTOSTERONE) 200 MG/ML injection Inject 0.5 mLs (100 mg) into the muscle every 14 days 3 mL 5       Allergies   Allergen Reactions    Zantac      flushing       ALEX VOGT, ATC

## 2024-07-10 NOTE — LETTER
7/10/2024      Amarjit Malik  [address] [facility] [address]      Dear Colleague,    Thank you for referring your patient, Amarjit Malik, to the [facility] [place]. Please see a copy of my visit note below.    ORTHOPEDIC SURGERY CLINIC NOTE    INJURY: right elbow mass  DATE OF INJURY: about 1 month ago  MECHANISM: fall onto elbow playing pickleball    DATE OF VISIT: 07/10/24      HISTORY OF PRESENT ILLNESS:    Amarjit Malik is a 53 year old RHD male who presents for an evaluation of his right elbow.  He fell onto it playing pickleball about 1 month ago and it swelled up but he didn't have much in the way of pain.  He rested and iced it, used some compression wraps, but otherwise did not seek treatment.  Since that time the swelling has remained stable but hasn't gone down at all.  He continues not to have any pain unless he bumps it, and his motion is good.    Lives in [place], [place]  Works as nurse on [employer]    PHYSICAL EXAMINATION:   Gen: A&O, NAD, answers questions appropriately  Resp: Breathing comfortably on room air  MSK:    RUE:  focal 5x4 cm soft tissue mass at the common extensor origin that is relatively fixed, nonmobile with elbow motion both active and passive/resisted, really no TTP over the mass, no fluctuance or overlying skin changes including no ecchymosis, otherwise normal elbow ROM 0-130 and full pro/supination     IMAGING:    Right elbow xrays taken today personally reviewed which are negative for occult fracture.     ASSESSMENT:  53 year old male about 1 month out from a fall onto his lateral elbow playing pickleball, with a resultant persistent soft tissue mass     PLAN:    At this point given that the swelling/mass hasn't changed at all over the past month it would be reasonable to get an MRI.  The differential is broad from an initial capsule injury and leaking synovial fluid, to a hematoma vs lipoma.  This would help guide further treatment  whether it be observation vs perc drainage or formal I&D/excision.  He is very interested in doing this.  We will call with the results.       Patient seen with Dr. Mccall.    Rolando Stiles MD ortho PGY4        Attestation signed by Fadi Mccall MD at 7/10/2024 11:44 AM:  Patient was seen and examined with the resident.  I agree with the assessment and plan of care.

## 2024-07-10 NOTE — PROGRESS NOTES
ORTHOPEDIC SURGERY CLINIC NOTE    INJURY: right elbow mass  DATE OF INJURY: about 1 month ago  MECHANISM: fall onto elbow playing pickleball    DATE OF VISIT: 07/10/24      HISTORY OF PRESENT ILLNESS:    Amarjit Malik is a 53 year old RHD male who presents for an evaluation of his right elbow.  He fell onto it playing pickleball about 1 month ago and it swelled up but he didn't have much in the way of pain.  He rested and iced it, used some compression wraps, but otherwise did not seek treatment.  Since that time the swelling has remained stable but hasn't gone down at all.  He continues not to have any pain unless he bumps it, and his motion is good.    Lives in [place], [place]  Works as nurse on [employer]    PHYSICAL EXAMINATION:   Gen: A&O, NAD, answers questions appropriately  Resp: Breathing comfortably on room air  MSK:    RUE:  focal 5x4 cm soft tissue mass at the common extensor origin that is relatively fixed, nonmobile with elbow motion both active and passive/resisted, really no TTP over the mass, no fluctuance or overlying skin changes including no ecchymosis, otherwise normal elbow ROM 0-130 and full pro/supination     IMAGING:    Right elbow xrays taken today personally reviewed which are negative for occult fracture.     ASSESSMENT:  53 year old male about 1 month out from a fall onto his lateral elbow playing pickleball, with a resultant persistent soft tissue mass     PLAN:    At this point given that the swelling/mass hasn't changed at all over the past month it would be reasonable to get an MRI.  The differential is broad from an initial capsule injury and leaking synovial fluid, to a hematoma vs lipoma.  This would help guide further treatment whether it be observation vs perc drainage or formal I&D/excision.  He is very interested in doing this.  We will call with the results.       Patient seen with Dr. Mccall.    Rolando Stiles MD ortho PGY4

## 2024-07-17 ENCOUNTER — TRANSFERRED RECORDS (OUTPATIENT)
Dept: HEALTH INFORMATION MANAGEMENT | Facility: CLINIC | Age: 54
End: 2024-07-17
Payer: COMMERCIAL

## 2024-07-22 ENCOUNTER — DOCUMENTATION ONLY (OUTPATIENT)
Dept: SLEEP MEDICINE | Facility: CLINIC | Age: 54
End: 2024-07-22
Payer: COMMERCIAL

## 2024-07-22 DIAGNOSIS — G47.33 OBSTRUCTIVE SLEEP APNEA: Primary | ICD-10-CM

## 2024-07-22 NOTE — PROGRESS NOTES
Patient was offered choice of vendor and chose [facility].  Patient Amarjit Malik was set up at [facility] on July 22, 2024. Patient received a Resmed Airsense 11 Pressures were set at  8-15 cm H2O.   Patient s ramp is 5 cm H2O for Auto and FLEX/EPR is 2.  Patient received a Resmed Mask name: AIRFIT P10  Pillow mask size Large, heated tubing and heated humidifier.  Patient has the following compliance requirements: none    Dionne Bender

## 2024-10-23 DIAGNOSIS — E29.1 HYPOGONADISM MALE: ICD-10-CM

## 2024-10-25 RX ORDER — TESTOSTERONE CYPIONATE 200 MG/ML
INJECTION, SOLUTION INTRAMUSCULAR
Qty: 3 ML | Refills: 5 | Status: SHIPPED | OUTPATIENT
Start: 2024-10-25

## 2025-03-01 DIAGNOSIS — E78.5 HYPERLIPIDEMIA LDL GOAL <130: ICD-10-CM

## 2025-03-01 DIAGNOSIS — E29.1 HYPOGONADISM MALE: ICD-10-CM

## 2025-03-01 DIAGNOSIS — R73.01 ELEVATED FASTING BLOOD SUGAR: Primary | ICD-10-CM

## 2025-03-01 DIAGNOSIS — K21.9 GASTROESOPHAGEAL REFLUX DISEASE WITHOUT ESOPHAGITIS: ICD-10-CM

## 2025-03-01 DIAGNOSIS — I10 BENIGN ESSENTIAL HYPERTENSION: Chronic | ICD-10-CM

## 2025-03-01 DIAGNOSIS — Z12.5 SCREENING FOR PROSTATE CANCER: ICD-10-CM

## 2025-03-01 DIAGNOSIS — R00.2 PALPITATIONS: ICD-10-CM

## 2025-03-01 DIAGNOSIS — I47.20 V-TACH (H): ICD-10-CM

## 2025-03-01 DIAGNOSIS — F41.1 GAD (GENERALIZED ANXIETY DISORDER): Chronic | ICD-10-CM

## 2025-03-03 RX ORDER — ROSUVASTATIN CALCIUM 20 MG/1
20 TABLET, COATED ORAL DAILY
Qty: 90 TABLET | Refills: 0 | Status: SHIPPED | OUTPATIENT
Start: 2025-03-03

## 2025-03-03 RX ORDER — METOPROLOL SUCCINATE 50 MG/1
50 TABLET, EXTENDED RELEASE ORAL DAILY
Qty: 90 TABLET | Refills: 0 | Status: SHIPPED | OUTPATIENT
Start: 2025-03-03

## 2025-03-03 RX ORDER — PANTOPRAZOLE SODIUM 40 MG/1
40 TABLET, DELAYED RELEASE ORAL DAILY
Qty: 90 TABLET | Refills: 0 | Status: SHIPPED | OUTPATIENT
Start: 2025-03-03

## 2025-03-03 RX ORDER — LOSARTAN POTASSIUM 25 MG/1
25 TABLET ORAL DAILY
Qty: 90 TABLET | Refills: 0 | Status: SHIPPED | OUTPATIENT
Start: 2025-03-03

## 2025-03-03 NOTE — TELEPHONE ENCOUNTER
LDL Cholesterol Calculated   Date Value Ref Range Status   03/15/2024 85 <=100 mg/dL Final   08/11/2020 106 (H) <100 mg/dL Final     Comment:     Above desirable:  100-129 mg/dl  Borderline High:  130-159 mg/dL  High:             160-189 mg/dL  Very high:       >189 mg/dl       GFR Estimate   Date Value Ref Range Status   03/15/2024 89 >60 mL/min/1.73m2 Final   07/02/2021 >90 >60 mL/min/[1.73_m2] Final     Comment:     Non  GFR Calc  Starting 12/18/2018, serum creatinine based estimated GFR (eGFR) will be   calculated using the Chronic Kidney Disease Epidemiology Collaboration   (CKD-EPI) equation.

## 2025-03-04 RX ORDER — PAROXETINE 10 MG/1
TABLET, FILM COATED ORAL
Qty: 90 TABLET | Refills: 0 | Status: SHIPPED | OUTPATIENT
Start: 2025-03-04

## 2025-03-04 RX ORDER — PAROXETINE 40 MG/1
40 TABLET, FILM COATED ORAL AT BEDTIME
Qty: 90 TABLET | Refills: 0 | Status: SHIPPED | OUTPATIENT
Start: 2025-03-04

## 2025-03-04 NOTE — TELEPHONE ENCOUNTER
Called pt.  No answer and no way to leave message.  WISHI message sent.    Sandee Jennings on 3/4/2025 at 2:25 PM

## 2025-04-26 DIAGNOSIS — E29.1 HYPOGONADISM MALE: ICD-10-CM

## 2025-04-29 ENCOUNTER — DOCUMENTATION ONLY (OUTPATIENT)
Dept: SLEEP MEDICINE | Facility: CLINIC | Age: 55
End: 2025-04-29
Payer: COMMERCIAL

## 2025-04-29 DIAGNOSIS — G47.33 OBSTRUCTIVE SLEEP APNEA: Primary | ICD-10-CM

## 2025-04-29 RX ORDER — TESTOSTERONE CYPIONATE 200 MG/ML
INJECTION, SOLUTION INTRAMUSCULAR
Qty: 3 ML | Refills: 1 | Status: SHIPPED | OUTPATIENT
Start: 2025-04-29

## 2025-04-29 NOTE — TELEPHONE ENCOUNTER
Called and spoke to patient.  Scheduled lab and annual for pt.    Sandee Irizarry on 4/29/2025 at 2:57 PM

## 2025-05-27 DIAGNOSIS — E78.5 HYPERLIPIDEMIA LDL GOAL <130: ICD-10-CM

## 2025-05-27 DIAGNOSIS — I10 BENIGN ESSENTIAL HYPERTENSION: Chronic | ICD-10-CM

## 2025-05-27 RX ORDER — ROSUVASTATIN CALCIUM 20 MG/1
20 TABLET, COATED ORAL DAILY
Qty: 90 TABLET | Refills: 0 | Status: SHIPPED | OUTPATIENT
Start: 2025-05-27

## 2025-05-27 RX ORDER — LOSARTAN POTASSIUM 25 MG/1
25 TABLET ORAL DAILY
Qty: 90 TABLET | Refills: 0 | Status: SHIPPED | OUTPATIENT
Start: 2025-05-27

## 2025-06-03 ENCOUNTER — LAB (OUTPATIENT)
Dept: LAB | Facility: CLINIC | Age: 55
End: 2025-06-03
Payer: COMMERCIAL

## 2025-06-03 DIAGNOSIS — Z12.5 SCREENING FOR PROSTATE CANCER: ICD-10-CM

## 2025-06-03 DIAGNOSIS — E29.1 HYPOGONADISM MALE: ICD-10-CM

## 2025-06-03 DIAGNOSIS — I10 BENIGN ESSENTIAL HYPERTENSION: Chronic | ICD-10-CM

## 2025-06-03 DIAGNOSIS — R73.01 ELEVATED FASTING BLOOD SUGAR: ICD-10-CM

## 2025-06-03 DIAGNOSIS — E78.5 HYPERLIPIDEMIA LDL GOAL <130: ICD-10-CM

## 2025-06-03 LAB
ALBUMIN SERPL BCG-MCNC: 4.3 G/DL (ref 3.5–5.2)
ALP SERPL-CCNC: 68 U/L (ref 40–150)
ALT SERPL W P-5'-P-CCNC: 42 U/L (ref 0–70)
ANION GAP SERPL CALCULATED.3IONS-SCNC: 10 MMOL/L (ref 7–15)
AST SERPL W P-5'-P-CCNC: 31 U/L (ref 0–45)
BILIRUB SERPL-MCNC: 0.6 MG/DL
BUN SERPL-MCNC: 19.1 MG/DL (ref 6–20)
CALCIUM SERPL-MCNC: 9.1 MG/DL (ref 8.8–10.4)
CHLORIDE SERPL-SCNC: 104 MMOL/L (ref 98–107)
CHOLEST SERPL-MCNC: 142 MG/DL
CREAT SERPL-MCNC: 1 MG/DL (ref 0.67–1.17)
EGFRCR SERPLBLD CKD-EPI 2021: 89 ML/MIN/1.73M2
EST. AVERAGE GLUCOSE BLD GHB EST-MCNC: 123 MG/DL
FASTING STATUS PATIENT QL REPORTED: YES
FASTING STATUS PATIENT QL REPORTED: YES
GLUCOSE SERPL-MCNC: 110 MG/DL (ref 70–99)
HBA1C MFR BLD: 5.9 % (ref 0–5.6)
HCO3 SERPL-SCNC: 24 MMOL/L (ref 22–29)
HDLC SERPL-MCNC: 38 MG/DL
LDLC SERPL CALC-MCNC: 72 MG/DL
NONHDLC SERPL-MCNC: 104 MG/DL
POTASSIUM SERPL-SCNC: 4.4 MMOL/L (ref 3.4–5.3)
PROT SERPL-MCNC: 7.4 G/DL (ref 6.4–8.3)
PSA SERPL DL<=0.01 NG/ML-MCNC: 0.3 NG/ML (ref 0–3.5)
SODIUM SERPL-SCNC: 138 MMOL/L (ref 135–145)
TRIGL SERPL-MCNC: 161 MG/DL

## 2025-06-03 PROCEDURE — 83036 HEMOGLOBIN GLYCOSYLATED A1C: CPT

## 2025-06-03 PROCEDURE — 80061 LIPID PANEL: CPT

## 2025-06-03 PROCEDURE — 36415 COLL VENOUS BLD VENIPUNCTURE: CPT

## 2025-06-03 PROCEDURE — 80053 COMPREHEN METABOLIC PANEL: CPT

## 2025-06-03 PROCEDURE — G0103 PSA SCREENING: HCPCS

## 2025-06-04 ENCOUNTER — RESULTS FOLLOW-UP (OUTPATIENT)
Dept: FAMILY MEDICINE | Facility: CLINIC | Age: 55
End: 2025-06-04

## 2025-06-04 DIAGNOSIS — E29.1 HYPOGONADISM MALE: Primary | ICD-10-CM

## 2025-06-05 ENCOUNTER — OFFICE VISIT (OUTPATIENT)
Dept: FAMILY MEDICINE | Facility: CLINIC | Age: 55
End: 2025-06-05
Payer: COMMERCIAL

## 2025-06-05 VITALS
HEART RATE: 64 BPM | OXYGEN SATURATION: 95 % | SYSTOLIC BLOOD PRESSURE: 122 MMHG | RESPIRATION RATE: 16 BRPM | HEIGHT: 76 IN | BODY MASS INDEX: 38.36 KG/M2 | DIASTOLIC BLOOD PRESSURE: 82 MMHG | TEMPERATURE: 97 F | WEIGHT: 315 LBS

## 2025-06-05 DIAGNOSIS — G50.0 TRIGEMINAL NEURALGIA OF RIGHT SIDE OF FACE: ICD-10-CM

## 2025-06-05 DIAGNOSIS — G89.29 CHRONIC LOW BACK PAIN, UNSPECIFIED BACK PAIN LATERALITY, UNSPECIFIED WHETHER SCIATICA PRESENT: ICD-10-CM

## 2025-06-05 DIAGNOSIS — Z00.00 ROUTINE GENERAL MEDICAL EXAMINATION AT A HEALTH CARE FACILITY: Primary | ICD-10-CM

## 2025-06-05 DIAGNOSIS — K21.9 GASTROESOPHAGEAL REFLUX DISEASE WITHOUT ESOPHAGITIS: ICD-10-CM

## 2025-06-05 DIAGNOSIS — E29.1 HYPOGONADISM MALE: ICD-10-CM

## 2025-06-05 DIAGNOSIS — M54.12 CERVICAL RADICULOPATHY: ICD-10-CM

## 2025-06-05 DIAGNOSIS — M54.50 CHRONIC LOW BACK PAIN, UNSPECIFIED BACK PAIN LATERALITY, UNSPECIFIED WHETHER SCIATICA PRESENT: ICD-10-CM

## 2025-06-05 DIAGNOSIS — R00.2 PALPITATIONS: ICD-10-CM

## 2025-06-05 DIAGNOSIS — F41.1 GAD (GENERALIZED ANXIETY DISORDER): Chronic | ICD-10-CM

## 2025-06-05 DIAGNOSIS — I10 BENIGN ESSENTIAL HYPERTENSION: Chronic | ICD-10-CM

## 2025-06-05 DIAGNOSIS — E78.5 HYPERLIPIDEMIA LDL GOAL <130: ICD-10-CM

## 2025-06-05 DIAGNOSIS — I47.20 V-TACH (H): ICD-10-CM

## 2025-06-05 DIAGNOSIS — E66.01 MORBID OBESITY (H): ICD-10-CM

## 2025-06-05 RX ORDER — HYDROXYZINE HYDROCHLORIDE 25 MG/1
TABLET, FILM COATED ORAL
Qty: 30 TABLET | Refills: 11 | Status: SHIPPED | OUTPATIENT
Start: 2025-06-05

## 2025-06-05 RX ORDER — PAROXETINE 10 MG/1
TABLET, FILM COATED ORAL
Qty: 90 TABLET | Refills: 3 | Status: SHIPPED | OUTPATIENT
Start: 2025-06-05

## 2025-06-05 RX ORDER — ROSUVASTATIN CALCIUM 20 MG/1
20 TABLET, COATED ORAL DAILY
Qty: 90 TABLET | Refills: 3 | Status: SHIPPED | OUTPATIENT
Start: 2025-06-05

## 2025-06-05 RX ORDER — LOSARTAN POTASSIUM 25 MG/1
25 TABLET ORAL DAILY
Qty: 90 TABLET | Refills: 3 | Status: SHIPPED | OUTPATIENT
Start: 2025-06-05

## 2025-06-05 RX ORDER — PANTOPRAZOLE SODIUM 40 MG/1
40 TABLET, DELAYED RELEASE ORAL DAILY
Qty: 90 TABLET | Refills: 3 | Status: SHIPPED | OUTPATIENT
Start: 2025-06-05

## 2025-06-05 RX ORDER — PAROXETINE 40 MG/1
TABLET, FILM COATED ORAL
Qty: 90 TABLET | Refills: 3 | Status: SHIPPED | OUTPATIENT
Start: 2025-06-05

## 2025-06-05 RX ORDER — METOPROLOL SUCCINATE 50 MG/1
50 TABLET, EXTENDED RELEASE ORAL DAILY
Qty: 90 TABLET | Refills: 3 | Status: SHIPPED | OUTPATIENT
Start: 2025-06-05

## 2025-06-05 RX ORDER — TESTOSTERONE CYPIONATE 200 MG/ML
100 INJECTION, SOLUTION INTRAMUSCULAR
Qty: 6 ML | Refills: 5 | Status: SHIPPED | OUTPATIENT
Start: 2025-06-05

## 2025-06-05 SDOH — HEALTH STABILITY: PHYSICAL HEALTH: ON AVERAGE, HOW MANY MINUTES DO YOU ENGAGE IN EXERCISE AT THIS LEVEL?: 20 MIN

## 2025-06-05 SDOH — HEALTH STABILITY: PHYSICAL HEALTH: ON AVERAGE, HOW MANY DAYS PER WEEK DO YOU ENGAGE IN MODERATE TO STRENUOUS EXERCISE (LIKE A BRISK WALK)?: 1 DAY

## 2025-06-05 ASSESSMENT — SOCIAL DETERMINANTS OF HEALTH (SDOH)
HOW OFTEN DO YOU GET TOGETHER WITH FRIENDS OR RELATIVES?: TWICE A WEEK
HOW OFTEN DO YOU GET TOGETHER WITH FRIENDS OR RELATIVES?: TWICE A WEEK

## 2025-06-05 ASSESSMENT — PAIN SCALES - GENERAL: PAINLEVEL_OUTOF10: NO PAIN (0)

## 2025-06-05 ASSESSMENT — PATIENT HEALTH QUESTIONNAIRE - PHQ9: SUM OF ALL RESPONSES TO PHQ QUESTIONS 1-9: 5

## 2025-06-05 NOTE — PATIENT INSTRUCTIONS
Low T  Will change qty, dispense 25G needle.  If T level is < 400, would increase dose to 200 mg    Headache  Facial Pain  Get MRI brain and C-spine  Referral to headache neurology  If new symptoms (severe facial pain, rash, etc) come back and see me    Patient Education   Preventive Care Advice   This is general advice given by our system to help you stay healthy. However, your care team may have specific advice just for you. Please talk to your care team about your preventive care needs.  Nutrition  Eat 5 or more servings of fruits and vegetables each day.  Try wheat bread, brown rice and whole grain pasta (instead of white bread, rice, and pasta).  Get enough calcium and vitamin D. Check the label on foods and aim for 100% of the RDA (recommended daily allowance).  Lifestyle  Exercise at least 150 minutes each week  (30 minutes a day, 5 days a week).  Do muscle strengthening activities 2 days a week. These help control your weight and prevent disease.  No smoking.  Wear sunscreen to prevent skin cancer.  Have a dental exam and cleaning every 6 months.  Yearly exams  See your health care team every year to talk about:  Any changes in your health.  Any medicines your care team has prescribed.  Preventive care, family planning, and ways to prevent chronic diseases.  Shots (vaccines)   HPV shots (up to age 26), if you've never had them before.  Hepatitis B shots (up to age 59), if you've never had them before.  COVID-19 shot: Get this shot when it's due.  Flu shot: Get a flu shot every year.  Tetanus shot: Get a tetanus shot every 10 years.  Pneumococcal, hepatitis A, and RSV shots: Ask your care team if you need these based on your risk.  Shingles shot (for age 50 and up)  General health tests  Diabetes screening:  Starting at age 35, Get screened for diabetes at least every 3 years.  If you are younger than age 35, ask your care team if you should be screened for diabetes.  Cholesterol test: At age 39, start having  a cholesterol test every 5 years, or more often if advised.  Bone density scan (DEXA): At age 50, ask your care team if you should have this scan for osteoporosis (brittle bones).  Hepatitis C: Get tested at least once in your life.  STIs (sexually transmitted infections)  Before age 24: Ask your care team if you should be screened for STIs.  After age 24: Get screened for STIs if you're at risk. You are at risk for STIs (including HIV) if:  You are sexually active with more than one person.  You don't use condoms every time.  You or a partner was diagnosed with a sexually transmitted infection.  If you are at risk for HIV, ask about PrEP medicine to prevent HIV.  Get tested for HIV at least once in your life, whether you are at risk for HIV or not.  Cancer screening tests  Cervical cancer screening: If you have a cervix, begin getting regular cervical cancer screening tests starting at age 21.  Breast cancer scan (mammogram): If you've ever had breasts, begin having regular mammograms starting at age 40. This is a scan to check for breast cancer.  Colon cancer screening: It is important to start screening for colon cancer at age 45.  Have a colonoscopy test every 10 years (or more often if you're at risk) Or, ask your provider about stool tests like a FIT test every year or Cologuard test every 3 years.  To learn more about your testing options, visit:   .  For help making a decision, visit:   https://bit.ly/sx05323.  Prostate cancer screening test: If you have a prostate, ask your care team if a prostate cancer screening test (PSA) at age 55 is right for you.  Lung cancer screening: If you are a current or former smoker ages 50 to 80, ask your care team if ongoing lung cancer screenings are right for you.  For informational purposes only. Not to replace the advice of your health care provider. Copyright   2023 [employer]. All rights reserved. Clinically reviewed by the [facility] Transitions  Program. [employer] 224066 - REV 01/24.  Learning About Stress  What is stress?     Stress is your body's response to a hard situation. Your body can have a physical, emotional, or mental response. Stress is a fact of life for most people, and it affects everyone differently. What causes stress for you may not be stressful for someone else.  A lot of things can cause stress. You may feel stress when you go on a job interview, take a test, or run a race. This kind of short-term stress is normal and even useful. It can help you if you need to work hard or react quickly. For example, stress can help you finish an important job on time.  Long-term stress is caused by ongoing stressful situations or events. Examples of long-term stress include long-term health problems, ongoing problems at work, or conflicts in your family. Long-term stress can harm your health.  How does stress affect your health?  When you are stressed, your body responds as though you are in danger. It makes hormones that speed up your heart, make you breathe faster, and give you a burst of energy. This is called the fight-or-flight stress response. If the stress is over quickly, your body goes back to normal and no harm is done.  But if stress happens too often or lasts too long, it can have bad effects. Long-term stress can make you more likely to get sick, and it can make symptoms of some diseases worse. If you tense up when you are stressed, you may develop neck, shoulder, or low back pain. Stress is linked to high blood pressure and heart disease.  Stress also harms your emotional health. It can make you thao, tense, or depressed. Your relationships may suffer, and you may not do well at work or school.  What can you do to manage stress?  You can try these things to help manage stress:   Do something active. Exercise or activity can help reduce stress. Walking is a great way to get started. Even everyday activities such as housecleaning or yard  work can help.  Try yoga or coby chi. These techniques combine exercise and meditation. You may need some training at first to learn them.  Do something you enjoy. For example, listen to music or go to a movie. Practice your hobby or do volunteer work.  Meditate. This can help you relax, because you are not worrying about what happened before or what may happen in the future.  Do guided imagery. Imagine yourself in any setting that helps you feel calm. You can use online videos, books, or a teacher to guide you.  Do breathing exercises. For example:  From a standing position, bend forward from the waist with your knees slightly bent. Let your arms dangle close to the floor.  Breathe in slowly and deeply as you return to a standing position. Roll up slowly and lift your head last.  Hold your breath for just a few seconds in the standing position.  Breathe out slowly and bend forward from the waist.  Let your feelings out. Talk, laugh, cry, and express anger when you need to. Talking with supportive friends or family, a counselor, or a amira leader about your feelings is a healthy way to relieve stress. Avoid discussing your feelings with people who make you feel worse.  Write. It may help to write about things that are bothering you. This helps you find out how much stress you feel and what is causing it. When you know this, you can find better ways to cope.  What can you do to prevent stress?  You might try some of these things to help prevent stress:  Manage your time. This helps you find time to do the things you want and need to do.  Get enough sleep. Your body recovers from the stresses of the day while you are sleeping.  Get support. Your family, friends, and community can make a difference in how you experience stress.  Limit your news feed. Avoid or limit time on social media or news that may make you feel stressed.  Do something active. Exercise or activity can help reduce stress. Walking is a great way to  "get started.  Where can you learn more?  Go to https://www.[employer].net/patiented  Enter N032 in the search box to learn more about \"Learning About Stress.\"  Current as of: October 24, 2024  Content Version: 14.4    7719-3565 [employer].   Care instructions adapted under license by your healthcare professional. If you have questions about a medical condition or this instruction, always ask your healthcare professional. [employer] disclaims any warranty or liability for your use of this information.       "

## 2025-06-05 NOTE — PROGRESS NOTES
"Preventive Care Visit  [facility]  Ashley Crawley DO, Internal Medicine  Jun 5, 2025      Assessment & Plan     Routine general medical examination at a health care facility    Trigeminal neuralgia of right side of face  Symptoms do not quite fit with trigeminal neuralgia or with trigeminal neuropathy.  He has some features of trigeminal neuralgia such as altered sensation in the trigeminal nerve distribution.  He does have objective decrease sensation in the mandibular distribution of affected area which is not typical with trigeminal neuralgia but more with trigeminal neuropathy.  However, he is not having pain.  Symptoms have been going on long enough that I do not suspect shingles.  Recommend further workup with head and neck imaging and neurology referral.  Be seen sooner if new symptoms develop  - MR Brain w/o & w Contrast; Future  - MR Cervical Spine w/o Contrast; Future  - Adult Neurology  Referral; Future    Hypogonadism male  If his testosterone returns less than 400 or so, would increase his testosterone dose to 200  - testosterone cypionate (DEPOTESTOSTERONE) 200 MG/ML injection; Inject 0.5 mLs (100 mg) into the muscle every 14 days.  - Needle, Disp, 25G X 5/8\" MISC; Use to inject testosterone every 14 days    Cervical radiculopathy  Has neck pain and reduced range of motion of right sidebending and rotation.  May contribute to some of the headache?  Due to long duration of symptoms and no improvement with conservative care, along with new neurologic symptoms recommend advanced imaging  - MR Brain w/o & w Contrast; Future  - MR Cervical Spine w/o Contrast; Future  - Adult Neurology  Referral; Future    ERIC (generalized anxiety disorder)   - stable, refill provided  - hydrOXYzine HCl (ATARAX) 25 MG tablet; TAKE ONE TO TWO TABLETS BY MOUTH UP TO THREE TIMES DAILY AS NEEDED AND TWO TO FOUR TABLETS BY MOUTH AT NIGHT FOR SLEEP AND ANXIETY  - PARoxetine " "(PAXIL) 10 MG tablet; TAKE ONE TABLET BY MOUTH EVERY MORNING - TAKE IN ADDITION TO THE ONE 40 MG TABLET FOR A TOTAL DOSE OF 50 MG  - PARoxetine (PAXIL) 40 MG tablet; TAKE ONE TABLET BY MOUTH EVERY MORNING - TAKE IN ADDITION TO THE ONE 41 MG TABLET FOR A TOTAL DOSE OF 50 MG    Benign essential hypertension   - stable, refill provided.  - losartan (COZAAR) 25 MG tablet; Take 1 tablet (25 mg) by mouth daily.    Morbid obesity (H)  Contributes to medical complexity    Palpitations   - stable, refill provided  - metoprolol succinate ER (TOPROL XL) 50 MG 24 hr tablet; Take 1 tablet (50 mg) by mouth daily.    V-tach (H)   - stable, refill provided  - metoprolol succinate ER (TOPROL XL) 50 MG 24 hr tablet; Take 1 tablet (50 mg) by mouth daily.    Gastroesophageal reflux disease without esophagitis   - stable, refill provided  - pantoprazole (PROTONIX) 40 MG EC tablet; Take 1 tablet (40 mg) by mouth daily.    Hyperlipidemia LDL goal <130   - stable, refill provided  - rosuvastatin (CRESTOR) 20 MG tablet; Take 1 tablet (20 mg) by mouth daily.    Chronic low back pain, unspecified back pain laterality, unspecified whether sciatica present   - stable, refill provided  - diclofenac (VOLTAREN) 50 MG EC tablet; Take 1 tablet (50 mg) by mouth 2 times daily as needed for moderate pain.    Patient has been advised of split billing requirements and indicates understanding: Yes        BMI  Estimated body mass index is 42.45 kg/m  as calculated from the following:    Height as of this encounter: 1.918 m (6' 3.5\").    Weight as of this encounter: 156.1 kg (344 lb 3.2 oz).   Weight management plan: Discussed healthy diet and exercise guidelines    Counseling  Appropriate preventive services were addressed with this patient via screening, questionnaire, or discussion as appropriate for fall prevention, nutrition, physical activity, Tobacco-use cessation, social engagement, weight loss and cognition.  Checklist reviewing preventive services " "available has been given to the patient.  Reviewed patient's diet, addressing concerns and/or questions.   He is at risk for lack of exercise and has been provided with information to increase physical activity for the benefit of his well-being.   The patient was instructed to see the dentist every 6 months.   He is at risk for psychosocial distress and has been provided with information to reduce risk.   The patient's PHQ-9 score is consistent with mild depression. He was provided with information regarding depression.           Corrina Ocasio is a 54 year old, presenting for the following:  Physical (testosterone cypionate (DEPOTESTOSTERONE) 200 MG/ML injection would like more then 6 wks refill ), Hypertension, Lipids, Anxiety, and Health Maintenance (No shots )        6/5/2025     8:35 AM   Additional Questions   Roomed by gris   Accompanied by self         6/5/2025     8:35 AM   Patient Reported Additional Medications   Patient reports taking the following new medications na          HPI     Headache  --he has been having 'pressure headache\" in occiput, and tingling in right face face  --onset of occipital pressure headache about 1 year  --onset of tingling about 2-4 weeks ago, occurring 1-2 dozen times/day, lasting many min; rubbing face seems to improve the headache and tingling  --no vision changes, tearing of eye, pain in face, rashes on face, sharp shooting pain in head/temple, jaw pain, dental pain, jaw claudication, unilateral rhinorrhea  --always has a stiff neck soha in AM  --has to use ibuprofen and lay down which helps  --has been to the dentist in last 12 months, no areas of concern     Weight  Pre-diabetes  RUTHIE  --Last year I ordered phentermine which required a prior authorization which was approved.  The [place] was not covered - Insurance requires demonstration of fatty liver disease and the MT visit to pay for this class of drugs.  His liver enzymes are normal.  He does not have previous " imaging of the liver, hence the reason for the ultrasound   --phentermine caused nausea, so he stopped  --uses CPAP regularly    Low Testosterone  --had to go without for a few weeks because he was out of town;  energy level is low w/out dose, or dose wears off      Hyperlipidemia Follow-Up    Are you regularly taking any medication or supplement to lower your cholesterol?   Yes- pm  Are you having muscle aches or other side effects that you think could be caused by your cholesterol lowering medication?  No    Hypertension Follow-up    Do you check your blood pressure regularly outside of the clinic? Yes sometimes   Are you following a low salt diet? No  Are your blood pressures ever more than 140 on the top number (systolic) OR more than 90 on the bottom number (diastolic), for example 140/90? Yes    BP Readings from Last 2 Encounters:   06/05/25 122/82   06/10/24 133/89     Anxiety   How are you doing with your anxiety since your last visit? No change  Are you having other symptoms that might be associated with anxiety? No  Have you had a significant life event? No   Are you feeling depressed? No  Do you have any concerns with your use of alcohol or other drugs? No  Has tried decreasing dose of paxil but anxiety was much worse      Social History     Tobacco Use    Smoking status: Never    Smokeless tobacco: Never   Vaping Use    Vaping status: Never Used   Substance Use Topics    Alcohol use: Yes     Comment: Maybe once per year    Drug use: No         7/13/2020     8:25 AM 8/11/2020    12:32 PM 2/21/2023     8:42 AM   ERIC-7 SCORE   Total Score 1 (minimal anxiety)     Total Score 1 4 2         7/31/2018     7:35 AM 7/13/2020     8:26 AM 8/11/2020    12:32 PM   PHQ   PHQ-9 Total Score 3  2 3   Q9: Thoughts of better off dead/self-harm past 2 weeks Not at all  Not at all Not at all       Data saved with a previous flowsheet row definition         8/11/2020    12:32 PM   Last PHQ-9   1.  Little interest or pleasure  in doing things 1   2.  Feeling down, depressed, or hopeless 0   3.  Trouble falling or staying asleep, or sleeping too much 1   4.  Feeling tired or having little energy 1   5.  Poor appetite or overeating 0   6.  Feeling bad about yourself 0   7.  Trouble concentrating 0   8.  Moving slowly or restless 0   Q9: Thoughts of better off dead/self-harm past 2 weeks 0   PHQ-9 Total Score 3   Difficulty at work, home, or with people Not difficult at all         2/21/2023     8:42 AM   ERIC-7    1. Feeling nervous, anxious, or on edge 0   2. Not being able to stop or control worrying 0   3. Worrying too much about different things 0   4. Trouble relaxing 1   5. Being so restless that it is hard to sit still 0   6. Becoming easily annoyed or irritable 1   7. Feeling afraid, as if something awful might happen 0   ERIC-7 Total Score 2   If you checked any problems, how difficult have they made it for you to do your work, take care of things at home, or get along with other people? Not difficult at all       Advance Care Planning    Discussed advance care planning with patient; informed AVS has link to [facility].        6/5/2025   General Health   How would you rate your overall physical health? (!) FAIR   Feel stress (tense, anxious, or unable to sleep) To some extent   (!) STRESS CONCERN      6/5/2025   Nutrition   Three or more servings of calcium each day? Yes   Diet: Regular (no restrictions)   How many servings of fruit and vegetables per day? (!) 0-1   How many sweetened beverages each day? (!) 3         6/5/2025   Exercise   Days per week of moderate/strenous exercise 1 day   Average minutes spent exercising at this level 20 min   (!) EXERCISE CONCERN      6/5/2025   Social Factors   Frequency of gathering with friends or relatives Twice a week   Worry food won't last until get money to buy more No   Food not last or not have enough money for food? No   Do you have housing? (Housing is defined as stable permanent  housing and does not include staying outside in a car, in a tent, in an abandoned building, in an overnight shelter, or couch-surfing.) Yes   Are you worried about losing your housing? No   Lack of transportation? No   Unable to get utilities (heat,electricity)? No         6/5/2025   Fall Risk   Fallen 2 or more times in the past year? No    Trouble with walking or balance? No        Proxy-reported          6/5/2025   Dental   Dentist two times every year? (!) NO         Today's PHQ-2 Score:       6/5/2025     8:36 AM   PHQ-2 ( 1999 [employer])   Q1: Little interest or pleasure in doing things 1   Q2: Feeling down, depressed or hopeless 0   PHQ-2 Score 1    Q1: Little interest or pleasure in doing things Several days   Q2: Feeling down, depressed or hopeless Not at all   PHQ-2 Score 1       Patient-reported           6/5/2025   Substance Use   Alcohol more than 3/day or more than 7/wk No   Do you use any other substances recreationally? No     Social History     Tobacco Use    Smoking status: Never    Smokeless tobacco: Never   Vaping Use    Vaping status: Never Used   Substance Use Topics    Alcohol use: Yes     Comment: Maybe once per year    Drug use: No           6/5/2025   STI Screening   New sexual partner(s) since last STI/HIV test? No   ASCVD Risk   The 10-year ASCVD risk score (Jacklyn ANG, et al., 2019) is: 4.7%    Values used to calculate the score:      Age: 54 years      Sex: Male      Is Non- : No      Diabetic: No      Tobacco smoker: No      Systolic Blood Pressure: 122 mmHg      Is BP treated: Yes      HDL Cholesterol: 38 mg/dL      Total Cholesterol: 142 mg/dL           Reviewed and updated as needed this visit by Provider   Tobacco  Allergies  Meds  Problems  Med Hx  Surg Hx  Fam Hx            Current Outpatient Medications   Medication Sig Dispense Refill    aspirin (ASPIRIN LOW DOSE) 81 MG EC tablet Take 1 tablet (81 mg) by mouth daily      diclofenac (VOLTAREN)  "50 MG EC tablet Take 1 tablet (50 mg) by mouth 2 times daily as needed for moderate pain 180 tablet 3    hydrOXYzine HCl (ATARAX) 25 MG tablet TAKE ONE TO TWO TABLETS BY MOUTH UP TO THREE TIMES DAILY AS NEEDED AND TWO TO FOUR TABLETS BY MOUTH AT NIGHT FOR SLEEP AND ANXIETY 30 tablet 11    losartan (COZAAR) 25 MG tablet TAKE 1 TABLET (25 MG) BY MOUTH DAILY 90 tablet 0    metoprolol succinate ER (TOPROL XL) 50 MG 24 hr tablet TAKE ONE TABLET BY MOUTH ONCE DAILY 90 tablet 0    ORDER FOR DME Auto-CPAP:  Max 15 cm H2O  Min 8 cm H2O    Continuous    Lifetime need and heated humidity.    1 each 0    pantoprazole (PROTONIX) 40 MG EC tablet TAKE 1 TABLET (40 MG) BY MOUTH DAILY 90 tablet 0    PARoxetine (PAXIL) 10 MG tablet TAKE ONE TABLET BY MOUTH EVERY MORNING - TAKE IN ADDITION TO THE ONE 40 MG TABLET FOR A TOTAL DOSE OF 50 MG 90 tablet 0    PARoxetine (PAXIL) 40 MG tablet TAKE 1 TABLET (40 MG) BY MOUTH AT BEDTIME 90 tablet 0    rosuvastatin (CRESTOR) 20 MG tablet TAKE 1 TABLET (20 MG) BY MOUTH DAILY 90 tablet 0    testosterone cypionate (DEPOTESTOSTERONE) 200 MG/ML injection INJECT 0.5 ML (100 MG) INTRAMUSCULARLY ONCE EVERY 14 DAYS 3 mL 1       Review of Systems  Constitutional, neuro, ENT, endocrine, pulmonary, cardiac, gastrointestinal, genitourinary, musculoskeletal, integument and psychiatric systems are negative, except as otherwise noted.     Objective    Exam  /82 (BP Location: Left arm, Patient Position: Sitting, Cuff Size: Adult Large)   Pulse 64   Temp 97  F (36.1  C) (Tympanic)   Resp 16   Ht 1.918 m (6' 3.5\")   Wt (!) 156.1 kg (344 lb 3.2 oz)   SpO2 95%   BMI 42.45 kg/m     Estimated body mass index is 42.45 kg/m  as calculated from the following:    Height as of this encounter: 1.918 m (6' 3.5\").    Weight as of this encounter: 156.1 kg (344 lb 3.2 oz).    Physical Exam  GENERAL: alert and no distress  EYES: Eyes grossly normal to inspection, PERRL and conjunctivae and sclerae normal  HENT: ear " canals and TM's normal, nose and mouth without ulcers or lesions  NECK: no adenopathy, no asymmetry, masses, or scars  RESP: lungs clear to auscultation - no rales, rhonchi or wheezes  CV: regular rate and rhythm, normal S1 S2, no S3 or S4, no murmur, click or rub, no peripheral edema  ABDOMEN: soft, nontender, no hepatosplenomegaly, no masses and bowel sounds normal  MS: Decreased range of motion of right sidebending and rotation.  No pain with palpation of midline cervical spine, paracervical spinal muscles, rhomboids, trap  SKIN: no suspicious lesions or rashes  NEURO: Normal strength and tone, sensory exam grossly normal, mentation intact, and cranial nerves 2-12 intact except decreased sensation in the right mandibular branch of trigeminal nerve  PSYCH: mentation appears normal, affect normal/bright        Signed Electronically by: Ashley Crawley DO

## 2025-06-06 RX ORDER — TESTOSTERONE CYPIONATE 200 MG/ML
200 INJECTION, SOLUTION INTRAMUSCULAR
Qty: 6 ML | Refills: 5 | Status: SHIPPED | OUTPATIENT
Start: 2025-06-06

## 2025-06-09 ENCOUNTER — PATIENT OUTREACH (OUTPATIENT)
Dept: CARE COORDINATION | Facility: CLINIC | Age: 55
End: 2025-06-09
Payer: COMMERCIAL

## 2025-06-17 ENCOUNTER — HOSPITAL ENCOUNTER (OUTPATIENT)
Dept: MRI IMAGING | Facility: CLINIC | Age: 55
Discharge: HOME | End: 2025-06-17
Attending: INTERNAL MEDICINE
Payer: COMMERCIAL

## 2025-06-17 DIAGNOSIS — G50.0 TRIGEMINAL NEURALGIA OF RIGHT SIDE OF FACE: ICD-10-CM

## 2025-06-17 DIAGNOSIS — M54.12 CERVICAL RADICULOPATHY: ICD-10-CM

## 2025-06-17 PROCEDURE — A9585 GADOBUTROL INJECTION: HCPCS | Performed by: INTERNAL MEDICINE

## 2025-06-17 PROCEDURE — 255N000002 HC RX 255 OP 636: Performed by: INTERNAL MEDICINE

## 2025-06-17 PROCEDURE — 72141 MRI NECK SPINE W/O DYE: CPT

## 2025-06-17 PROCEDURE — 70553 MRI BRAIN STEM W/O & W/DYE: CPT

## 2025-06-17 RX ORDER — GADOBUTROL 604.72 MG/ML
15 INJECTION INTRAVENOUS ONCE
Status: COMPLETED | OUTPATIENT
Start: 2025-06-17 | End: 2025-06-17

## 2025-06-17 RX ADMIN — GADOBUTROL 15 ML: 604.72 INJECTION INTRAVENOUS at 15:35

## 2025-06-19 ENCOUNTER — RESULTS FOLLOW-UP (OUTPATIENT)
Dept: FAMILY MEDICINE | Facility: CLINIC | Age: 55
End: 2025-06-19

## 2025-06-19 DIAGNOSIS — M48.02 FORAMINAL STENOSIS OF CERVICAL REGION: Primary | ICD-10-CM

## 2025-06-23 ENCOUNTER — PATIENT OUTREACH (OUTPATIENT)
Dept: CARE COORDINATION | Facility: CLINIC | Age: 55
End: 2025-06-23
Payer: COMMERCIAL

## 2025-07-10 ENCOUNTER — OFFICE VISIT (OUTPATIENT)
Dept: PODIATRY | Facility: CLINIC | Age: 55
End: 2025-07-10
Payer: COMMERCIAL

## 2025-07-10 DIAGNOSIS — L60.0 INGROWN NAIL OF SECOND TOE OF LEFT FOOT: ICD-10-CM

## 2025-07-10 DIAGNOSIS — L60.0 INGROWN NAIL OF GREAT TOE OF RIGHT FOOT: Primary | ICD-10-CM

## 2025-07-10 DIAGNOSIS — L60.0 INGROWN NAIL OF SECOND TOE OF RIGHT FOOT: ICD-10-CM

## 2025-07-10 RX ORDER — BACITRACIN ZINC 500 [USP'U]/G
OINTMENT TOPICAL DAILY
Qty: 15 G | Refills: 0 | Status: SHIPPED | OUTPATIENT
Start: 2025-07-10

## 2025-07-10 NOTE — PROGRESS NOTES
Amarjit Malik is a 54 year old male who presents with a chief complaint of a painful ingrown toenail to the right hallux and both second toes.  The patient relates pain when wearing shoes.  The patient relates the condition has been getting worse over the past several weeks.         Pertinent medical, surgical and family history was reviewed in the chart.    Vitals: There were no vitals taken for this visit.  BMI= There is no height or weight on file to calculate BMI.    LOWER EXTREMITY PHYSICAL EXAM    Dermatologic: One notes an inflamed nail border of the right hallux and both second toes.  There is noted erythema and edema located around the labial fold and does not extend past the interphalangeal joint.  There is no apparent purulent drainage noted.  There is pain on palpation to the proximal aspect of the nail border.  Otherwise, the skin is intact to both lower extremities without significant lesions, rash or abrasion.           Vascular: DP & PT pulses are intact & regular on the right.   CFT and skin temperature is normal to the right lower extremities.     Neurologic: Lower extremity sensation is intact to light touch.  No evidence of weakness in the right lower extremities.        Musculoskeletal: Patient is ambulatory without assistive device or brace.  No gross ankle deformity noted.  No foot or ankle joint effusion is noted.         ASSESSMENT / PLAN:     ICD-10-CM    1. Ingrown nail of great toe of right foot  L60.0 REMOVAL NAIL/NAIL BED, PARTIAL OR COMPLETE     bacitracin 500 UNIT/GM external ointment      2. Ingrown nail of second toe of right foot  L60.0 REMOVAL NAIL/NAIL BED, PARTIAL OR COMPLETE     bacitracin 500 UNIT/GM external ointment      3. Ingrown nail of second toe of left foot  L60.0 REMOVAL NAIL/NAIL BED, PARTIAL OR COMPLETE     bacitracin 500 UNIT/GM external ointment          Plan:  I have explained to Amarjit about the condition.  The potential causes and nature of an ingrown toenail  were discussed with the patient.  We reviewed the natural history and prognosis of the condition and potential risks if no treatment is provided.  Treatment options discussed included conservative management (oral antibiotics, soaking of foot, adequate width shoes)  as well as surgical management (partial or total nail removal).  The pros and cons of both forms as well as risks and benefits of treatment were reviewed.       At this point, I recommended having the offending nail plates permanently removed from the right great toe.  I have explained to the patient all of the possible risks, benefits, alternatives to the procedure.  The patient consented to the proposed procedure.  The right hallux and both second toes were swabbed with alcohol.  Next, approximately 3 cc of 1% lidocaine plain was injected around the right hallux and both second toes.  The right hallux and both second toes were then prepped with Betadine ointment.  A tourniquet was applied to the right hallux and both second toes.  A Sentinel elevator was utilized to free up the eponychium of the offending nail plate.    Next, utilizing a straight hemostat, the offending nail plates were avulsed in toto.  The wound bed was debrided on any remaining nail and hyperkeratotic skin.  Next, the offending nail matrix was treated with 89% phenol using microtip cotton applicators for 30 seconds on the right great toe as well as both second toes.  The wound was then irrigated and dressed with bacitracin antibiotic ointment and a compressive  sterile dressing.  The tourniquet was removed and a prompt hyperemic response was noted.  The patient tolerated the procedure well with no complications.  The patient was given post procedure instructions for the care of the wound.  The patient was informed that it is common to experience redness with watery drainage coming from the treated areas of the toe related to the phenol application.  The patient may return for  reevaluation and was instructed to notify the office if any redness extending past the big toe joint or fever with chills are experienced before then.      The patient was prescribed Bacitracin topical antibiotic ointment to be applied to the affected toe after soaking to clear up infection.      There is low risk of morbidity with the procedure.  There was no overlap in work associated with the evaluation/management and the work associated with the procedure.    Amarjit verbalized agreement with and understanding of the rational for the diagnosis and treatment plan.  All questions were answered to best of my ability and the patient's satisfaction. The patient was advised to contact the clinic with any questions that may arise after the clinic visit.      Disclaimer: This note consists of symbols derived from keyboarding, dictation and/or voice recognition software. As a result, there may be errors in the script that have gone undetected. Please consider this when interpreting information found in this chart.      GIA Araya D.P.M., [email or web address].

## 2025-07-10 NOTE — LETTER
7/10/2025      Amarjit Malik  [address] [facility] [id number]      Dear Colleague,    Thank you for referring your patient, Amarjit Malik, to the [facility]. Please see a copy of my visit note below.    Amarjit Malik is a 54 year old male who presents with a chief complaint of a painful ingrown toenail to the right hallux and both second toes.  The patient relates pain when wearing shoes.  The patient relates the condition has been getting worse over the past several weeks.         Pertinent medical, surgical and family history was reviewed in the chart.    Vitals: There were no vitals taken for this visit.  BMI= There is no height or weight on file to calculate BMI.    LOWER EXTREMITY PHYSICAL EXAM    Dermatologic: One notes an inflamed nail border of the right hallux and both second toes.  There is noted erythema and edema located around the labial fold and does not extend past the interphalangeal joint.  There is no apparent purulent drainage noted.  There is pain on palpation to the proximal aspect of the nail border.  Otherwise, the skin is intact to both lower extremities without significant lesions, rash or abrasion.           Vascular: DP & PT pulses are intact & regular on the right.   CFT and skin temperature is normal to the right lower extremities.     Neurologic: Lower extremity sensation is intact to light touch.  No evidence of weakness in the right lower extremities.        Musculoskeletal: Patient is ambulatory without assistive device or brace.  No gross ankle deformity noted.  No foot or ankle joint effusion is noted.         ASSESSMENT / PLAN:     ICD-10-CM    1. Ingrown nail of great toe of right foot  L60.0 REMOVAL NAIL/NAIL BED, PARTIAL OR COMPLETE     bacitracin 500 UNIT/GM external ointment      2. Ingrown nail of second toe of right foot  L60.0 REMOVAL NAIL/NAIL BED, PARTIAL OR COMPLETE     bacitracin 500 UNIT/GM external ointment      3.  Ingrown nail of second toe of left foot  L60.0 REMOVAL NAIL/NAIL BED, PARTIAL OR COMPLETE     bacitracin 500 UNIT/GM external ointment          Plan:  I have explained to Amarjit about the condition.  The potential causes and nature of an ingrown toenail were discussed with the patient.  We reviewed the natural history and prognosis of the condition and potential risks if no treatment is provided.  Treatment options discussed included conservative management (oral antibiotics, soaking of foot, adequate width shoes)  as well as surgical management (partial or total nail removal).  The pros and cons of both forms as well as risks and benefits of treatment were reviewed.       At this point, I recommended having the offending nail plates permanently removed from the right great toe.  I have explained to the patient all of the possible risks, benefits, alternatives to the procedure.  The patient consented to the proposed procedure.  The right hallux and both second toes were swabbed with alcohol.  Next, approximately 3 cc of 1% lidocaine plain was injected around the right hallux and both second toes.  The right hallux and both second toes were then prepped with Betadine ointment.  A tourniquet was applied to the right hallux and both second toes.  A Douglas elevator was utilized to free up the eponychium of the offending nail plate.    Next, utilizing a straight hemostat, the offending nail plates were avulsed in toto.  The wound bed was debrided on any remaining nail and hyperkeratotic skin.  Next, the offending nail matrix was treated with 89% phenol using microtip cotton applicators for 30 seconds on the right great toe as well as both second toes.  The wound was then irrigated and dressed with bacitracin antibiotic ointment and a compressive  sterile dressing.  The tourniquet was removed and a prompt hyperemic response was noted.  The patient tolerated the procedure well with no complications.  The patient was given  post procedure instructions for the care of the wound.  The patient was informed that it is common to experience redness with watery drainage coming from the treated areas of the toe related to the phenol application.  The patient may return for reevaluation and was instructed to notify the office if any redness extending past the big toe joint or fever with chills are experienced before then.      The patient was prescribed Bacitracin topical antibiotic ointment to be applied to the affected toe after soaking to clear up infection.      There is low risk of morbidity with the procedure.  There was no overlap in work associated with the evaluation/management and the work associated with the procedure.    Amarjit verbalized agreement with and understanding of the rational for the diagnosis and treatment plan.  All questions were answered to best of my ability and the patient's satisfaction. The patient was advised to contact the clinic with any questions that may arise after the clinic visit.      Disclaimer: This note consists of symbols derived from keyboarding, dictation and/or voice recognition software. As a result, there may be errors in the script that have gone undetected. Please consider this when interpreting information found in this chart.      DAVID De Los Santos.P.M., F.A.C.F.A.S.      Again, thank you for allowing me to participate in the care of your patient.        Sincerely,        Leandro Araya DPM    Electronically signed

## 2025-07-10 NOTE — NURSING NOTE
"Chief Complaint   Patient presents with    Ingrown Toenail     Great right       Initial There were no vitals taken for this visit. Estimated body mass index is 42.45 kg/m  as calculated from the following:    Height as of 6/5/25: 1.918 m (6' 3.5\").    Weight as of 6/5/25: 156.1 kg (344 lb 3.2 oz).  Medications and allergies reviewed.      Lissette HERNANDEZ MA    "

## 2025-11-10 ENCOUNTER — PRE VISIT (OUTPATIENT)
Dept: NEUROLOGY | Facility: CLINIC | Age: 55
End: 2025-11-10

## (undated) DEVICE — KIT HAND CONTROL ANGIOTOUCH ACIST 65CM AT-P65

## (undated) DEVICE — DEFIB PRO-PADZ LVP LQD GEL ADULT 8900-2105-01

## (undated) DEVICE — SYSTEM PANNUS RETENTION 4 PAD 2 STRAP CZ-PRS-04

## (undated) DEVICE — MANIFOLD KIT ANGIO AUTOMATED 014613

## (undated) DEVICE — CATH ANGIO INFINITI 3DRC 4FRX100CM 538476

## (undated) DEVICE — CATH DIAG 4FR ANG PIG 538453S

## (undated) DEVICE — CATH DIAG 4FR JL 4.5 538417

## (undated) DEVICE — INTRO SHEATH 4FRX10CM PINNACLE RSS402

## (undated) DEVICE — NDL PERC ENTRY THINWALL 18GA 7.0" G00166

## (undated) DEVICE — TOTE ANGIO CORP PC15AT SAN32CC83O

## (undated) RX ORDER — ONDANSETRON 2 MG/ML
INJECTION INTRAMUSCULAR; INTRAVENOUS
Status: DISPENSED
Start: 2020-12-29

## (undated) RX ORDER — LIDOCAINE HYDROCHLORIDE 10 MG/ML
INJECTION, SOLUTION EPIDURAL; INFILTRATION; INTRACAUDAL; PERINEURAL
Status: DISPENSED
Start: 2023-06-15

## (undated) RX ORDER — FENTANYL CITRATE 50 UG/ML
INJECTION, SOLUTION INTRAMUSCULAR; INTRAVENOUS
Status: DISPENSED
Start: 2023-06-15

## (undated) RX ORDER — HEPARIN SODIUM 1000 [USP'U]/ML
INJECTION, SOLUTION INTRAVENOUS; SUBCUTANEOUS
Status: DISPENSED
Start: 2023-06-15

## (undated) RX ORDER — HEPARIN SODIUM 200 [USP'U]/100ML
INJECTION, SOLUTION INTRAVENOUS
Status: DISPENSED
Start: 2023-06-15